# Patient Record
Sex: MALE | Race: WHITE | NOT HISPANIC OR LATINO | Employment: FULL TIME | URBAN - METROPOLITAN AREA
[De-identification: names, ages, dates, MRNs, and addresses within clinical notes are randomized per-mention and may not be internally consistent; named-entity substitution may affect disease eponyms.]

---

## 2017-03-23 ENCOUNTER — TRANSFERRED RECORDS (OUTPATIENT)
Dept: HEALTH INFORMATION MANAGEMENT | Facility: CLINIC | Age: 52
End: 2017-03-23

## 2017-03-23 LAB — EJECTION FRACTION: 67

## 2017-03-25 ENCOUNTER — TRANSFERRED RECORDS (OUTPATIENT)
Dept: HEALTH INFORMATION MANAGEMENT | Facility: CLINIC | Age: 52
End: 2017-03-25

## 2017-04-04 ENCOUNTER — TRANSFERRED RECORDS (OUTPATIENT)
Dept: HEALTH INFORMATION MANAGEMENT | Facility: CLINIC | Age: 52
End: 2017-04-04

## 2017-11-10 ENCOUNTER — TRANSFERRED RECORDS (OUTPATIENT)
Dept: HEALTH INFORMATION MANAGEMENT | Facility: CLINIC | Age: 52
End: 2017-11-10

## 2017-11-10 LAB
ALT SERPL-CCNC: 11 IU/L (ref 0–44)
AST SERPL-CCNC: 22 IU/L (ref 0–40)
CREAT SERPL-MCNC: 0.67 MG/DL (ref 0.76–1.27)
GFR SERPL CREATININE-BSD FRML MDRD: 109 ML/MIN/1.73M2
GLUCOSE SERPL-MCNC: 95 MG/DL (ref 65–99)
POTASSIUM SERPL-SCNC: 4.6 MMOL/L (ref 3.5–5.2)

## 2018-02-16 ENCOUNTER — TELEPHONE (OUTPATIENT)
Dept: OTHER | Facility: CLINIC | Age: 53
End: 2018-02-16

## 2018-02-16 DIAGNOSIS — I73.9 INTERMITTENT CLAUDICATION (H): Primary | ICD-10-CM

## 2018-02-16 NOTE — TELEPHONE ENCOUNTER
Pt spouse, Mariam, left  stating pt had been referred to Dr. Castillo by Dr. Ortega ([facility]).  Called Mariam back to discuss reason for referral.  Stated pt had received referral more than 6 months ago and pt had not pursued the referral.  Mariam stated there had been some imaging completed, specifically imaging done to evaluate for pt increase in headaches.  Mariam also states pt has difficulty walking and has pain after walking approximately 20 feet.  Denies any open wounds or sores.  Phone call made to [facility] to receive most recent OV notes/imaging results.  Will review documents once received to determine if any further imaging is needed and make consult appt with Dr. Castillo.  Informed Mariam a call back would be made once plan of care is determined.  Spouse verbalized understanding, had no further questions, and agreed with plan.  Belinda Mcelroy, DEANDREN, RN

## 2018-02-16 NOTE — TELEPHONE ENCOUNTER
"Received fax of records from Dr. Ortega's office.   Per 3/16/17 note: \"no palpable DP or PostTib pulses; decreased hair pattern from the ankles distally, sluggish cap refill in feet\"  \"Intermittent claudication: referred to Dr. Castillo for ABIs and consultation\".    Called Mariam (wife) back, left , regarding plan.  Stated [facility] will call her number ([phone]) to arrange appointments.    Pt needs to be scheduled for ADRIAN with exercise and consult with Dr. Castillo (per referring).  Will route to scheduling to coordinate an appointment at next available.    CLAUDIO Rodrigues RN    "

## 2018-03-08 ENCOUNTER — OFFICE VISIT (OUTPATIENT)
Dept: OTHER | Facility: CLINIC | Age: 53
End: 2018-03-08
Attending: SURGERY
Payer: COMMERCIAL

## 2018-03-08 ENCOUNTER — HOSPITAL ENCOUNTER (OUTPATIENT)
Dept: ULTRASOUND IMAGING | Facility: CLINIC | Age: 53
Discharge: HOME | End: 2018-03-08
Attending: SURGERY | Admitting: SURGERY
Payer: COMMERCIAL

## 2018-03-08 VITALS
HEART RATE: 87 BPM | WEIGHT: 155 LBS | DIASTOLIC BLOOD PRESSURE: 77 MMHG | SYSTOLIC BLOOD PRESSURE: 155 MMHG | HEIGHT: 70 IN | BODY MASS INDEX: 22.19 KG/M2

## 2018-03-08 DIAGNOSIS — I73.9 INTERMITTENT CLAUDICATION (H): ICD-10-CM

## 2018-03-08 DIAGNOSIS — F17.200 TOBACCO DEPENDENCE SYNDROME: ICD-10-CM

## 2018-03-08 DIAGNOSIS — I73.9 PAD (PERIPHERAL ARTERY DISEASE) (H): Primary | ICD-10-CM

## 2018-03-08 PROCEDURE — 99204 OFFICE O/P NEW MOD 45 MIN: CPT | Mod: ZP | Performed by: SURGERY

## 2018-03-08 PROCEDURE — 93924 LWR XTR VASC STDY BILAT: CPT

## 2018-03-08 PROCEDURE — G0463 HOSPITAL OUTPT CLINIC VISIT: HCPCS

## 2018-03-08 RX ORDER — ROSUVASTATIN CALCIUM 20 MG/1
TABLET, COATED ORAL
Status: ON HOLD | COMMUNITY
Start: 2018-02-14 | End: 2018-03-23

## 2018-03-08 RX ORDER — LISINOPRIL/HYDROCHLOROTHIAZIDE 10-12.5 MG
2 TABLET ORAL EVERY MORNING
COMMUNITY
Start: 2018-02-14 | End: 2023-04-25

## 2018-03-08 NOTE — NURSING NOTE
"Chief Complaint   Patient presents with     Consult     ADRIAN w/ exercise (2:15 VHC; 3:00 WRO) History of intermittent claudication; coordinate appointment with Dr. Castillo       Initial /75 (BP Location: Left arm, Patient Position: Chair, Cuff Size: Adult Regular)  Pulse 87  Ht 5' 10\" (1.778 m)  Wt 155 lb (70.3 kg)  BMI 22.24 kg/m2 Estimated body mass index is 22.24 kg/(m^2) as calculated from the following:    Height as of this encounter: 5' 10\" (1.778 m).    Weight as of this encounter: 155 lb (70.3 kg).  Medication Reconciliation: complete     Trina Neal MA   "

## 2018-03-08 NOTE — LETTER
[facility] [place] [address]  [place] [address]  Phone: [phone]  Fax: [phone]      2018    Re: Grey Candelario - 1965    Grey Candelario and his significant other come to see me in the office today for evaluation of his leg discomfort.  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  Symptoms depend on the levels activity including how fast he walks or whether he is going up or down stairs.  Pain is primarily in the posterior calf but also in the buttock area.  Complains of leg fatigue with any exercise.  He has never had any breast pain or ulcerations.     He is referred to see me today by Dr. Ortega.     PMH: Medications: Lisinopril-HCTZ 10-12 0.5 mg daily, Crestor 20 mg daily     He has been on these medications for approximately a year.               Medical: Hypertension, hyperlipidemia                         History of carotid bruits.  Reported normal MRI performed at                        [facility] 1 year ago (not available to me today).              Surgical: L4-5 anterior posterior fusion several years ago with initial good                         results but noticing more symptoms recently.     Patient smokes 1/2-2 pack cigarettes daily for past 40 years.  Has not tried to quit and very questionable whether he wants to quit.      FMH: No significant underlying vascular problems nor diabetes.  ROS: Walks on a daily basis at work.  Becoming more difficult to due to increasing leg discomfort also involving buttock area.  Denies a history of cardiac problems.  No history of diabetes.        Exam: Very pleasant alert thin gentleman.  Normal affect.             Blood pressure  146/75 left arm  and 155/77 right arm.  Pulse 87 regular             Soft bilateral carotid bruits.  Chest= clear.   Cardiovascular=RR             Abdomen= soft nontender. Well-healed left infraumbilical paramedian incision from back surgery.             Extremities= normal sensation, no edema, no ulcerations.             +2 right femoral and +3 left femoral pulse.             No palpable popliteal pulses bilaterally. No palpable right pedal pulses.  +2 left dorsalis pedis pulse.        Ankle-brachial index is 0.62 on the right posterior tibial artery and 0.76 in the left posterior tibial are rest.  This decreases to 0.53 on the right and 0.67 on the left with exercise at 5 minutes. Patient noted bilateral buttock discomfort at 1 minute more prominent on the right than left side with generalized bilateral calf fatigue.  Waveforms are abnormal bilaterally.  More so on the right with the femoral arteries biphasic in the pedal arteries monophasic.  On the left the femoral and popliteal were triphasic/biphasic with a biphasic pedal Doppler.      Impression: Bilateral intermittent claudication symptoms more prominent in the right than left side.  He has decreased femoral pulses bilaterally but much more so on the right and this is confirmed by the ADRIAN with exercise.  I suspect he has right greater than left iliac occlusive disease and likely more distal disease also.  This is not going to improve with any conservative measures.  He is becoming more symptomatic and this is affecting both his work and normal lifestyle.  Thus intervention will be necessary.     He will need an aortogram with bilateral runoffs via left femoral percutaneous approach.  There is a good likelihood we would be able to perform angioplasty and stenting if these are indeed iliac lesions and decisions would be made during the procedure whether interventional procedures are available if there is more distal disease.  Risks and benefits of the angiogram and discussed  with the patient and significant other.  Bleeding from the exit site would be less likely due to his thin body habitus.  He also is no evidence of renal insufficiency. He should be able to return to work in normal activities within 1-2 days following the procedure. If it is not feasible to correct these problems with interventional radiology technique we would discuss the possibility of surgical treatment of this would not be performed at the time of the angiogram.     My major concern is risk factor control. His blood pressure is still elevated but better on his medications.  I am not sure whether his LDL is at goal of less than 70--which is what we desire for people with underlying vascular problems.  He definitely needs to quit smoking completely and we spent a good portion of the consultation discussing the importance of this.  Ongoing smokers have a much higher risk of recurrent stenosis following angioplasty and stenting and also any vascular surgical procedure.  He is reluctant to quit smoking but I think he understood the message that we stressed today.     He does have carotid bruits but apparently normal MRI.  We will try to obtain the MRI reports from the outside source.          iGorgio Castillo MD

## 2018-03-08 NOTE — PROGRESS NOTES
[facility]    Grey Candelario and his significant other come to see me in the office today for evaluation of his leg discomfort.  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  Symptoms depend on the levels activity including how fast he walks or whether he is going up or down stairs.  Pain is primarily in the posterior calf but also in the buttock area.  Complains of leg fatigue with any exercise.  He has never had any breast pain or ulcerations.    He is referred to see me today by Dr. Orteag.    PMH: Medications: Lisinopril-HCTZ 10-12 0.5 mg daily                                  Crestor 20 mg daily                   He has been on these medications for approximately a year.                        Medical: Hypertension, hyperlipidemia                         History of carotid bruits.  Reported normal MRI performed at                                         [facility] 1 year ago (not available to me today).             Surgical: L4-5 anterior posterior fusion several years ago with initial good                                   results but noticing more symptoms recently.              Patient smokes 1/2-2 pack cigarettes daily for past 40 years.  Has not tried to quit and very questionable whether he wants to quit.      FMH: No significant underlying vascular problems nor diabetes.  ROS: Walks on a daily basis at work.  Becoming more difficult to due to increasing leg discomfort also involving buttock area.  Denies a history of cardiac problems.  No history of diabetes.      Exam: Very pleasant alert thin gentleman.  Normal affect.              Blood pressure  146/75 left arm and 155/77 right arm.  Pulse 87 regular              Soft bilateral  carotid bruits.  Chest= clear.   Cardiovascular=RR              Abdomen= soft nontender.  Well-healed left infraumbilical paramedian                          incision from back surgery.              Extremities= normal sensation, no edema, no ulcerations.               +2 right femoral and +3 left femoral pulse.               No palpable popliteal pulses bilaterally.               No palpable right pedal pulses.  +2 left dorsalis pedis pulse.      Ankle-brachial index is 0.62 on the right posterior tibial artery and 0.76 in the left posterior tibial are rest.  This decreases to 0.53 on the right and 0.67 on the left with exercise at 5 minutes.  Patient noted bilateral buttock discomfort at 1 minute more prominent on the right than left side with generalized bilateral calf fatigue.  Waveforms are abnormal bilaterally.  More so on the right with the femoral arteries biphasic in the pedal arteries monophasic.  On the left the femoral and popliteal were triphasic/biphasic with a biphasic pedal Doppler.             Impression: Bilateral intermittent claudication symptoms more prominent in the right than left side.  He has decreased femoral pulses bilaterally but much more so on the right and this is confirmed by the ADRIAN with exercise.  I suspect he has right greater than left iliac occlusive disease and likely more distal disease also.  This is not going to improve with any conservative measures.  He is becoming more symptomatic and this is affecting both his work and normal lifestyle.  Thus intervention will be necessary.                        He will need an aortogram with bilateral runoffs via left femoral percutaneous approach.  There is a good likelihood we would be able to perform angioplasty and stenting if these are indeed iliac lesions and decisions would be made during the procedure whether interventional procedures are available if there is more distal disease.  Risks and benefits of the angiogram and  discussed with the patient and significant other.  Bleeding from the exit site would be less likely due to his thin body habitus.  He also is no evidence of renal insufficiency.  He should be able to return to work in normal activities within 1-2 days following the procedure.  If it is not feasible to correct these problems with interventional radiology technique we would discuss the possibility of surgical treatment of this would not be performed at the time of the angiogram.                   My major concern is risk factor control.      His blood pressure is still elevated but better on his medications.  I am not sure whether his LDL is at goal of less than 70--which is what we desire for people with underlying vascular problems.  He definitely needs to quit smoking completely and we spent a good portion of the consultation discussing the importance of this.  Ongoing smokers have a much higher risk of recurrent stenosis following angioplasty and stenting and also any vascular surgical procedure.  He is reluctant to quit smoking but I think he understood the message that we stressed today.                   He does have carotid bruits but apparently normal MRI.  We will try to obtain the MRI reports from the outside source.      I spent over 45 minutes with the patient and his significant other today with over 50% in counseling        Giorgio Castillo MD     Please route or send letter to:  Primary Care Provider (PCP)        Addendum: We are able to [facility] which revealed no carotid disease.  Aortic vbdm-ifzeadzpm-zqpamts arteries are also normal.    Giorgio Castillo MD

## 2018-03-08 NOTE — MR AVS SNAPSHOT
"              After Visit Summary   3/8/2018    Grey Candelario    MRN: [id number]           Patient Information     Date Of Birth          1965        Visit Information        Provider Department      3/8/2018 3:00 PM Giorgio Castillo MD [facility]      Today's Diagnoses     PAD (peripheral artery disease) (H)    -  1    Tobacco dependence syndrome           Follow-ups after your visit        Who to contact     If you have questions or need follow up information about today's clinic visit or your schedule please contact [facility] directly at [phone].  Normal or non-critical lab and imaging results will be communicated to you by [employer], letter or phone within 4 business days after the clinic has received the results. If you do not hear from us within 7 days, please contact the clinic through [employer] or phone. If you have a critical or abnormal lab result, we will notify you by phone as soon as possible.  Submit refill requests through [employer] or call your pharmacy and they will forward the refill request to us. Please allow 3 business days for your refill to be completed.          Additional Information About Your Visit        MyChart Information     [employer] lets you send messages to your doctor, view your test results, renew your prescriptions, schedule appointments and more. To sign up, go to [email or web address] . Click on \"Log in\" on the left side of the screen, which will take you to the Welcome page. Then click on \"Sign up Now\" on the right side of the page.     You will be asked to enter the access code listed below, as well as some personal information. Please follow the directions to create your username and password.     Your access code is: [email or web address]  Expires: 2018  5:11 PM     Your access code will  in 90 days. If you need help or a new code, please call your [place] clinic or " "[phone].        Care EveryWhere ID     This is your Care EveryWhere ID. This could be used by other organizations to access your [place] medical records  [id number]        Your Vitals Were     Pulse Height BMI (Body Mass Index)             87 5' 10\" (1.778 m) 22.24 kg/m2          Blood Pressure from Last 3 Encounters:   03/08/18 155/77   07/21/13 130/76    Weight from Last 3 Encounters:   03/08/18 155 lb (70.3 kg)   07/21/13 160 lb (72.6 kg)              Today, you had the following     No orders found for display       Primary Care Provider Office Phone # Fax #    Santosh Cresencio Ortega [phone]       [facility] [address]    [facility] [address]        Equal Access to Services     [facility] : Hadii aad ku hadasho Soomaali, waaxda luqadaha, qaybta kaalmada adeegyada, abundio huntin hayaan nicole traylor . So [facility] [phone].    ATENCIÓN: Si habla español, tiene a grant disposición servicios gratuitos de asistencia lingüística. Terence al [phone].    We comply with applicable federal civil rights laws and [place] laws. We do not discriminate on the basis of race, color, national origin, age, disability, sex, sexual orientation, or gender identity.            Thank you!     Thank you for choosing [facility] [place]  for your care. Our goal is always to provide you with excellent care. Hearing back from our patients is one way we can continue to improve our services. Please take a few minutes to complete the written survey that you may receive in the mail after your visit with us. Thank you!             Your Updated Medication List - Protect others around you: Learn how to safely use, store and throw away your medicines at www.disposemymeds.org.          This list is accurate as of 3/8/18  5:11 PM.  Always use your most recent med list.                   Brand Name Dispense Instructions for use Diagnosis    " lisinopril-hydrochlorothiazide 10-12.5 MG per tablet    PRINZIDE/ZESTORETIC          rosuvastatin 20 MG tablet    CRESTOR

## 2018-03-12 ENCOUNTER — TELEPHONE (OUTPATIENT)
Dept: OTHER | Facility: CLINIC | Age: 53
End: 2018-03-12

## 2018-03-12 NOTE — TELEPHONE ENCOUNTER
Type of surgery: aortogram with bilateral runoff  Location of surgery: [facility] OR  Date and time of surgery: 3/23/18 @ 7:30 am  Surgeon: Dr Andrea Shepard to perform  Pre-Op Appt Date: unknown  Post-Op Appt Date: unknown   Packet sent out: given to patient  Pre-cert/Authorization completed:  Sent  Date: 031218 JAM

## 2018-03-23 ENCOUNTER — HOSPITAL ENCOUNTER (OUTPATIENT)
Facility: CLINIC | Age: 53
Discharge: HOME | End: 2018-03-23
Attending: RADIOLOGY | Admitting: RADIOLOGY
Payer: COMMERCIAL

## 2018-03-23 ENCOUNTER — APPOINTMENT (OUTPATIENT)
Dept: INTERVENTIONAL RADIOLOGY/VASCULAR | Facility: CLINIC | Age: 53
End: 2018-03-23
Attending: SURGERY
Payer: COMMERCIAL

## 2018-03-23 VITALS
BODY MASS INDEX: 22.19 KG/M2 | TEMPERATURE: 98.1 F | HEIGHT: 70 IN | SYSTOLIC BLOOD PRESSURE: 155 MMHG | RESPIRATION RATE: 18 BRPM | DIASTOLIC BLOOD PRESSURE: 70 MMHG | HEART RATE: 78 BPM | WEIGHT: 155 LBS | OXYGEN SATURATION: 98 %

## 2018-03-23 DIAGNOSIS — E78.5 HYPERLIPIDEMIA LDL GOAL <70: Primary | ICD-10-CM

## 2018-03-23 DIAGNOSIS — F17.218 CIGARETTE NICOTINE DEPENDENCE WITH OTHER NICOTINE-INDUCED DISORDER: ICD-10-CM

## 2018-03-23 DIAGNOSIS — I70.213 ATHEROSCLER OF NATIVE ARTERY OF BOTH LEGS WITH INTERMIT CLAUDICATION (H): ICD-10-CM

## 2018-03-23 LAB
APTT PPP: 28 SEC (ref 22–37)
CHOLEST SERPL-MCNC: 175 MG/DL
CREAT SERPL-MCNC: 0.74 MG/DL (ref 0.66–1.25)
ERYTHROCYTE [DISTWIDTH] IN BLOOD BY AUTOMATED COUNT: 12.2 % (ref 10–15)
GFR SERPL CREATININE-BSD FRML MDRD: >90 ML/MIN/1.7M2
HBA1C MFR BLD: 4.9 % (ref 4.3–6)
HCT VFR BLD AUTO: 39.3 % (ref 40–53)
HDLC SERPL-MCNC: 80 MG/DL
HGB BLD-MCNC: 13.7 G/DL (ref 13.3–17.7)
INR PPP: 0.96 (ref 0.86–1.14)
LDLC SERPL CALC-MCNC: 82 MG/DL
MCH RBC QN AUTO: 34 PG (ref 26.5–33)
MCHC RBC AUTO-ENTMCNC: 34.9 G/DL (ref 31.5–36.5)
MCV RBC AUTO: 98 FL (ref 78–100)
NONHDLC SERPL-MCNC: 95 MG/DL
PLATELET # BLD AUTO: 188 10E9/L (ref 150–450)
RBC # BLD AUTO: 4.03 10E12/L (ref 4.4–5.9)
TRIGL SERPL-MCNC: 63 MG/DL
WBC # BLD AUTO: 5.6 10E9/L (ref 4–11)

## 2018-03-23 PROCEDURE — 25000132 ZZH RX MED GY IP 250 OP 250 PS 637: Performed by: INTERNAL MEDICINE

## 2018-03-23 PROCEDURE — 25000128 H RX IP 250 OP 636: Performed by: RADIOLOGY

## 2018-03-23 PROCEDURE — 27210742 ZZH CATH CR1

## 2018-03-23 PROCEDURE — 99214 OFFICE O/P EST MOD 30 MIN: CPT | Performed by: INTERNAL MEDICINE

## 2018-03-23 PROCEDURE — 27210892 ZZH CATH CR4

## 2018-03-23 PROCEDURE — 36415 COLL VENOUS BLD VENIPUNCTURE: CPT | Performed by: RADIOLOGY

## 2018-03-23 PROCEDURE — 37221: CPT | Mod: 50

## 2018-03-23 PROCEDURE — 27210740 ZZH ACCESSORY CR3

## 2018-03-23 PROCEDURE — 83036 HEMOGLOBIN GLYCOSYLATED A1C: CPT | Performed by: RADIOLOGY

## 2018-03-23 PROCEDURE — C1725 CATH, TRANSLUMIN NON-LASER: HCPCS

## 2018-03-23 PROCEDURE — 27210906 ZZH KIT CR8

## 2018-03-23 PROCEDURE — C1769 GUIDE WIRE: HCPCS

## 2018-03-23 PROCEDURE — 99213 OFFICE O/P EST LOW 20 MIN: CPT | Performed by: SURGERY

## 2018-03-23 PROCEDURE — C1876 STENT, NON-COA/NON-COV W/DEL: HCPCS

## 2018-03-23 PROCEDURE — 37221 ZZHC REVASC ILIAC ART, ANGIO/STENT, INIT VESSEL: CPT

## 2018-03-23 PROCEDURE — 40000853 ZZH STATISTIC ANGIOGRAM, STENT, VERTEBRO PLASTY

## 2018-03-23 PROCEDURE — 27210804 ZZH SHEATH CR3

## 2018-03-23 PROCEDURE — 85730 THROMBOPLASTIN TIME PARTIAL: CPT | Performed by: RADIOLOGY

## 2018-03-23 PROCEDURE — 85027 COMPLETE CBC AUTOMATED: CPT | Performed by: RADIOLOGY

## 2018-03-23 PROCEDURE — 85610 PROTHROMBIN TIME: CPT | Performed by: RADIOLOGY

## 2018-03-23 PROCEDURE — 80061 LIPID PANEL: CPT | Performed by: RADIOLOGY

## 2018-03-23 PROCEDURE — 27210808 ZZH SHEATH CR7

## 2018-03-23 PROCEDURE — 25000125 ZZHC RX 250: Performed by: RADIOLOGY

## 2018-03-23 PROCEDURE — 82565 ASSAY OF CREATININE: CPT | Performed by: RADIOLOGY

## 2018-03-23 PROCEDURE — 27210886 ZZH ACCESSORY CR5

## 2018-03-23 PROCEDURE — 75774 ARTERY X-RAY EACH VESSEL: CPT

## 2018-03-23 PROCEDURE — 27210845 ZZH DEVICE INFLATION CR5

## 2018-03-23 PROCEDURE — 75625 CONTRAST EXAM ABDOMINL AORTA: CPT

## 2018-03-23 RX ORDER — FENTANYL CITRATE 50 UG/ML
INJECTION, SOLUTION INTRAMUSCULAR; INTRAVENOUS
Status: DISCONTINUED
Start: 2018-03-23 | End: 2018-03-23 | Stop reason: HOSPADM

## 2018-03-23 RX ORDER — LIDOCAINE HYDROCHLORIDE 10 MG/ML
1-30 INJECTION, SOLUTION EPIDURAL; INFILTRATION; INTRACAUDAL; PERINEURAL
Status: COMPLETED | OUTPATIENT
Start: 2018-03-23 | End: 2018-03-23

## 2018-03-23 RX ORDER — SODIUM CHLORIDE 9 MG/ML
INJECTION, SOLUTION INTRAVENOUS CONTINUOUS
Status: DISCONTINUED | OUTPATIENT
Start: 2018-03-23 | End: 2018-03-23 | Stop reason: HOSPADM

## 2018-03-23 RX ORDER — BUPROPION HYDROCHLORIDE 150 MG/1
150 TABLET, FILM COATED, EXTENDED RELEASE ORAL 2 TIMES DAILY
Qty: 60 TABLET | Refills: 3 | Status: ON HOLD | OUTPATIENT
Start: 2018-03-23 | End: 2019-11-14

## 2018-03-23 RX ORDER — FENTANYL CITRATE 50 UG/ML
25-50 INJECTION, SOLUTION INTRAMUSCULAR; INTRAVENOUS EVERY 5 MIN PRN
Status: DISCONTINUED | OUTPATIENT
Start: 2018-03-23 | End: 2018-03-23 | Stop reason: HOSPADM

## 2018-03-23 RX ORDER — HEPARIN SODIUM 1000 [USP'U]/ML
INJECTION, SOLUTION INTRAVENOUS; SUBCUTANEOUS
Status: DISCONTINUED
Start: 2018-03-23 | End: 2018-03-23 | Stop reason: HOSPADM

## 2018-03-23 RX ORDER — LIDOCAINE 40 MG/G
CREAM TOPICAL
Status: DISCONTINUED | OUTPATIENT
Start: 2018-03-23 | End: 2018-03-23 | Stop reason: HOSPADM

## 2018-03-23 RX ORDER — ROSUVASTATIN CALCIUM 40 MG/1
40 TABLET, COATED ORAL DAILY
Qty: 30 TABLET | Refills: 11 | Status: ON HOLD | OUTPATIENT
Start: 2018-03-23 | End: 2018-11-16

## 2018-03-23 RX ORDER — BUPROPION HYDROCHLORIDE 150 MG/1
150 TABLET, EXTENDED RELEASE ORAL 2 TIMES DAILY
Status: DISCONTINUED | OUTPATIENT
Start: 2018-03-23 | End: 2018-03-23 | Stop reason: HOSPADM

## 2018-03-23 RX ORDER — ROSUVASTATIN CALCIUM 20 MG/1
40 TABLET, COATED ORAL DAILY
Status: DISCONTINUED | OUTPATIENT
Start: 2018-03-23 | End: 2018-03-23 | Stop reason: HOSPADM

## 2018-03-23 RX ORDER — CLOPIDOGREL BISULFATE 75 MG/1
75 TABLET ORAL DAILY
Qty: 90 TABLET | Refills: 3 | Status: SHIPPED | OUTPATIENT
Start: 2018-03-23 | End: 2019-06-06

## 2018-03-23 RX ORDER — NALOXONE HYDROCHLORIDE 0.4 MG/ML
.1-.4 INJECTION, SOLUTION INTRAMUSCULAR; INTRAVENOUS; SUBCUTANEOUS
Status: DISCONTINUED | OUTPATIENT
Start: 2018-03-23 | End: 2018-03-23 | Stop reason: HOSPADM

## 2018-03-23 RX ORDER — NICOTINE 21 MG/24HR
1 PATCH, TRANSDERMAL 24 HOURS TRANSDERMAL DAILY
Status: DISCONTINUED | OUTPATIENT
Start: 2018-03-23 | End: 2018-03-23 | Stop reason: HOSPADM

## 2018-03-23 RX ORDER — FLUMAZENIL 0.1 MG/ML
0.2 INJECTION, SOLUTION INTRAVENOUS
Status: DISCONTINUED | OUTPATIENT
Start: 2018-03-23 | End: 2018-03-23 | Stop reason: HOSPADM

## 2018-03-23 RX ORDER — IODIXANOL 320 MG/ML
150 INJECTION, SOLUTION INTRAVASCULAR ONCE
Status: COMPLETED | OUTPATIENT
Start: 2018-03-23 | End: 2018-03-23

## 2018-03-23 RX ORDER — NICOTINE 21 MG/24HR
1 PATCH, TRANSDERMAL 24 HOURS TRANSDERMAL DAILY
Qty: 30 PATCH | Refills: 1 | Status: ON HOLD | OUTPATIENT
Start: 2018-03-23 | End: 2019-11-14

## 2018-03-23 RX ORDER — ACETAMINOPHEN 500 MG
500-1000 TABLET ORAL EVERY 6 HOURS PRN
Status: DISCONTINUED | OUTPATIENT
Start: 2018-03-23 | End: 2018-03-23 | Stop reason: HOSPADM

## 2018-03-23 RX ORDER — LIDOCAINE HYDROCHLORIDE 10 MG/ML
INJECTION, SOLUTION INFILTRATION; PERINEURAL
Status: DISCONTINUED
Start: 2018-03-23 | End: 2018-03-23 | Stop reason: HOSPADM

## 2018-03-23 RX ADMIN — BUPROPION HYDROCHLORIDE 150 MG: 150 TABLET, FILM COATED, EXTENDED RELEASE ORAL at 15:26

## 2018-03-23 RX ADMIN — FENTANYL CITRATE 50 MCG: 50 INJECTION INTRAMUSCULAR; INTRAVENOUS at 08:11

## 2018-03-23 RX ADMIN — FENTANYL CITRATE 50 MCG: 50 INJECTION INTRAMUSCULAR; INTRAVENOUS at 08:21

## 2018-03-23 RX ADMIN — MIDAZOLAM HYDROCHLORIDE 1 MG: 1 INJECTION, SOLUTION INTRAMUSCULAR; INTRAVENOUS at 08:21

## 2018-03-23 RX ADMIN — FENTANYL CITRATE 50 MCG: 50 INJECTION INTRAMUSCULAR; INTRAVENOUS at 08:28

## 2018-03-23 RX ADMIN — MIDAZOLAM HYDROCHLORIDE 1 MG: 1 INJECTION, SOLUTION INTRAMUSCULAR; INTRAVENOUS at 08:11

## 2018-03-23 RX ADMIN — SODIUM CHLORIDE: 9 INJECTION, SOLUTION INTRAVENOUS at 06:39

## 2018-03-23 RX ADMIN — MIDAZOLAM HYDROCHLORIDE 1 MG: 1 INJECTION, SOLUTION INTRAMUSCULAR; INTRAVENOUS at 08:47

## 2018-03-23 RX ADMIN — MIDAZOLAM HYDROCHLORIDE 1 MG: 1 INJECTION, SOLUTION INTRAMUSCULAR; INTRAVENOUS at 09:26

## 2018-03-23 RX ADMIN — LIDOCAINE HYDROCHLORIDE 8 ML: 10 INJECTION, SOLUTION INFILTRATION; PERINEURAL at 10:19

## 2018-03-23 RX ADMIN — IODIXANOL 110 ML: 320 INJECTION, SOLUTION INTRAVASCULAR at 10:23

## 2018-03-23 RX ADMIN — MIDAZOLAM HYDROCHLORIDE 1 MG: 1 INJECTION, SOLUTION INTRAMUSCULAR; INTRAVENOUS at 08:28

## 2018-03-23 RX ADMIN — FENTANYL CITRATE 50 MCG: 50 INJECTION INTRAMUSCULAR; INTRAVENOUS at 08:46

## 2018-03-23 RX ADMIN — FENTANYL CITRATE 50 MCG: 50 INJECTION INTRAMUSCULAR; INTRAVENOUS at 10:11

## 2018-03-23 RX ADMIN — FENTANYL CITRATE 50 MCG: 50 INJECTION INTRAMUSCULAR; INTRAVENOUS at 09:25

## 2018-03-23 RX ADMIN — MIDAZOLAM HYDROCHLORIDE 1 MG: 1 INJECTION, SOLUTION INTRAMUSCULAR; INTRAVENOUS at 10:11

## 2018-03-23 NOTE — PROCEDURES
RADIOLOGY PROCEDURE NOTE  Patient name: Grey Candelario  MRN: [id number]  : 1965    Pre-procedure diagnosis: Bilateral LE claudication  Post-procedure diagnosis: Same    Procedure Date/Time: 2018  11:51 AM  Procedure: Left groin (arterial) access and abdominal aortogram with bilateral LE angiogram.  7 mm X 60 mm self expanding SMART stent in right EIA, dilated up to 7 mm.  8 mm X 40 mm self expanding SMART stent in left EIA (dilated up to 7 mm).  Bilateral mild to moderate CFA arterial plaque.  Both SFA, popliteal, and 3 vessel runoff systems are patent.  No complications.  Plavix for 9 months.  Estimated blood loss:  5 ml  Specimen(s) collected with description: Please see above.  The patient tolerated the procedure well with no immediate complications.  Significant findings:  Please see above.    See imaging dictation for procedural details.    Provider name: Andrea Yen  Assistant(s):None

## 2018-03-23 NOTE — IP AVS SNAPSHOT
[address] [place] [address]    Phone:  [phone]                                       After Visit Summary   3/23/2018    Grey Candelario    MRN: [id number]           After Visit Summary Signature Page     I have received my discharge instructions, and my questions have been answered. I have discussed any challenges I see with this plan with the nurse or doctor.    ..........................................................................................................................................  Patient/Patient Representative Signature      ..........................................................................................................................................  Patient Representative Print Name and Relationship to Patient    ..................................................               ................................................  Date                                            Time    ..........................................................................................................................................  Reviewed by Signature/Title    ...................................................              ..............................................  Date                                                            Time

## 2018-03-23 NOTE — PROGRESS NOTES
1100- Returned from IR at 1045. VSS. Left groin site WDL.  1215- Eating a meal without difficulty.  1315- Voided in urinal. Girlfriend-Mariam updated via phone.  1540- Up and out of bed. Walked in hallway with nurse. Was lightheaded initially but that cleared.  Site remained soft, no bleeding. DCI reviewed with patient. He was given his stent card, contrast DCI, and AVS. Patient verbalizes understanding and will  [facility] tonight.

## 2018-03-23 NOTE — DISCHARGE INSTRUCTIONS
Peripheral Angiogram Discharge Instructions - Femoral     After you go home:      Have an adult stay with you until tomorrow.    Drink extra fluids for 2 days.    You may resume your normal diet.    No smoking       For 24 hours - due to the sedation you received:    Relax and take it easy.    Do NOT make any important or legal decisions.    Do NOT drive or operate machines at home or at work.    Do NOT drink alcohol.    Care of Groin Puncture Site:      For the first 24 hrs - check the puncture site every 1-2 hours while awake.    For 2 days, when you cough, sneeze, laugh or move your bowels, hold your hand over the puncture site and press firmly.    Remove the bandaid after 24 hours. If there is minor oozing, apply another bandaid and remove it after 12 hours.    It is normal to have a small bruise or pea size lump at the site.    You may shower tomorrow.  Do NOT take a bath, or use a hot tub or pool for at least 3 days. Do NOT scrub the site. Do not use lotion or powder near the puncture site.     Activity:            For 2 days:    No stooping or squatting    Do NOT do any heavy activity such as exercise, lifting, or straining.     No housework, yard work or any activity that make you sweat    Do NOT lift more than 10 pounds    Bleeding:      If you start bleeding from the site in your groin, lie down flat and press firmly on/above the site for 10 minutes.     Once bleeding stops, lay flat for 2 hours.     Call the [facility] as soon as you can.       Call 911 right away if you have heavy bleeding or bleeding that does not stop.      Medicines:      If you are taking antiplatelet medications such as Plavix, do not stop taking it until you talk to your provider.       Take your medications, including blood thinners, unless your provider tells you not to.  If you take Coumadin (Warfarin), have your INR checked by your provider in  3-5 days. Call your clinic to schedule this.    If you have stopped any  medicines, check with your provider about when to restart them.        Follow Up Appointments:      Follow up with [facility] as directed.    Call the clinic if:      You have increased pain or a large or growing hard lump around the site.    The site is red, swollen, hot or tender.    Blood or fluid is draining from the site.    You have chills or a fever greater than 101 F (38 C).    Your leg feels numb, cool or changes color.    You have hives, a rash or unusual itching.    New pain in the back or belly that you cannot control with Tylenol.    Any questions or concerns.    Other Instructions:      You received a stent - carry your stent card with you at all times.      If you have questions or your original symptoms do not improve, call:         [facility] @ [phone]

## 2018-03-23 NOTE — CONSULTS
VASCULAR SURGERY    Grey Candelario was admitted for angiography due to bilateral right greater than left chronic claudication symptoms that has become progressively worsening affecting his work and normal activities.  Pain is primarily in the calf region but also in the buttock area.  On exam he is noted to have a decreased right femoral pulse compared to the left.  There were no palpable right pedal pulses with a +2 left dorsalis pedis pulse.    Ankle-brachial index was 0.62 and the right posterior tibial artery decrease of 0.53 with exercise and on the left 0.76 decreasing to 0.67 with exercise.  We felt that he most likely had iliac disease and possible infrainguinal disease also.    He has history of hyperlipidemia under good control with medications along with hypertension.  He is a long-term smoker up to 2 packs daily for 40 years and has little desire to quit.  We strongly stressed the importance of complete smoking cessation with underlying peripheral artery disease and also that any intervention would have a higher risk of recurrent stenosis if ongoing tobacco abuse is continued.  Patient recently underwent a carotid evaluation which was unremarkable.      Hospital course:   Patient underwent aortogram with bilateral runoffs via left femoral percutaneous approach by Dr. Shepard of interventional radiology.  This revealed mild aortic and common iliac disease.  Mild disease at the origin of the internal iliacs noted but otherwise large vessels.  There was a high-grade stenosis within the distal right extraocular artery and slightly lesser on the left with diffuse disease through the external ocular arteries.  Mild to moderate disease was noted in the right common femoral with some mild disease in the right mid superficial femoral artery but otherwise three-vessel runoff to the foot.  Mild disease in the left common femoral and superficial femoral artery noted with three-vessel runoff to the foot and a  moderate stenosis in the origin of the anterior tibial artery.  The stenosis and external arc artery were hemostatically significant by pressure gradients and underwent successful angioplasty and stenting.  A 7 mm stent was placed on the right and an 8 mm stent on the left.  Patient tolerated this very well.                       In the recovery suite he had +3 palpable pulses in both dorsalis pedis and posterior tibial arteries.  No groin hematoma or other complication.                        Admission laboratory:  SCr= 0.74   hemoglobin A1c= 5.6    LDL= 82                           Following the angiogram I reviewed the angiographic findings with he and his girlfriend.  I also strongly stressed the importance of complete smoking cessation.  He said he would consider this.  We did inform them that there were various programs that could be arranged to help him quit smoking.  I would expect that he would notice almost complete relief of his preoperative symptoms even though he had mild infrainguinal disease noted.      Impression: #1   Symptomatic bilateral right greater than left claudication secondary to aorto iliac and infrainguinal vascular disease.  Status post aortogram with bilateral runoffs.  Successful angioplasty and stenting of high-grade stenosis of both external arteries.  Mild distal disease as described above that at the present time is not hemodynamically significant.  Recommend follow-up exam with me in 1 month.  Number                        #2    Hyperlipidemia.  LDL was close to goal on his Crestor.  We would recommend LDL less than 70 patient with underlying vascular problem the this is also been addressed..                            #3    Ongoing tobacco abuse.  Strongly recommended complete smoking cessation.    Giorgio Castillo MD

## 2018-03-23 NOTE — CONSULTS
[facility] [place]  Vascular Medicine Consultation         Young Carlos MD,[facility]  Vascular Medicine    Grey Candelario MRN# [id number]   YOB: 1965 Age: 52 year old      Date of Admission:  3/23/2018  Date of Consult: 3/23/2018         Assessment and Plan:     1. PAD with progressively worsening claudication bilateral LE,  RT>left side :   He has Aortoiliac and infrainguinal disease.    This is a very pleasant 52-year-old male with past medical history of hyperlipidemia, hypertension, lifetime smoker experiencing progressively worsening claudication right side worse than left side and recent ADRIAN Dopplers moderate arterial insufficiency underwent bilateral lower extremity angiogram today .   He underwent SMART stent in the right external iliac artery and smart stent in the left external iliac artery as delineated below per Dr. Burch     ''7 mm X 60 mm self expanding SMART stent in right EIA, dilated up to 7 mm.  8 mm X 40 mm self expanding SMART stent in left EIA (dilated up to 7 mm).  Bilateral mild to moderate CFA arterial plaque.  Both SFA, popliteal, and 3 vessel runoff systems are patent.  No complications.  Plavix for 9 months.''    Had a lengthy discussion with the patient optimizing all his risk factors specifically quitting smoking and benefits.  Please see below, he is willing to quit    Reviewed angiogram today discussed with the patient.    At present my recommendations,  Monitor groin access site and CMS.  IV fluids  Bedrest, follow post angioplasty,  Plavix 75 mg daily for 9 months  Increase rosuvastatin 40 mg daily  Assistant quitting smoking as delineated below  Offered PAD exercise program but patient declined  Follow-up with IR for stent surveillance  Follow-up with the doctor Anna in 1 month.      2. Nicotine dependence;  He has been smoking 2 packs of cigarettes daily for decades and tried in the past unsuccessful quitting smoking.  After lengthy discussion with  the patient he is willing to take patches and also Wellbutrin pills.  Initiate Wellbutrin  mg daily for 3 days then followed by twice a day side effects discussed with the patient mostly insomnia.  Initiate NicoDerm 21 mg patches daily for a month and if he makes progress tapered down to 14 mg and get a new prescription per primary MD.  Prescriptions of both Wellbutrin and NicoDerm patches sent to his pharmacy    3. HTN;   reasonably controlled with current lisinopril with hydrochlorothiazide combination continue the same.  DASH diet discussed with the patient and suggested him to quit smoking.  Monitor blood pressure outside.  Avoid NSAIDs, excess alcohol    4. Hyperlipidemia:   He currently takes 20 mg of rosuvastatin daily and LDL is not at goal.  Given bilateral peripheral arterial disease, hypertension and smoker he will benefit with maximum dose of rosuvastatin increase 40 mg daily and new prescription was faxed.  Suggested patient to get lipid panel in 3 months through primary MD.  Therapeutic lifestyle modification suggested    Total patient care time spent today 70 minutes, more than 50% of the time spent counseling of the above-mentioned multiple medical issues.  Reviewed angiogram discussed with consultants.    This note was dictated by utilizing dragon software   Thank you for the consultation    Cc; primary care physician  MD Anna Marroquin MD            Code Status:   Full Code         Primary Care Physician:   Santosh Ortega [phone]         Requesting Physician:      MD Andrea Healy MD         Chief Complaint:   PAD with multiple atherosclerotic risk factors, smoker for vascular risk factor managment    History is obtained from the patient, reviewed EPIC         History of Present Illness:   Grey Candelario is a 52 year old male with past medical history of hypertension, hyperlipidemia, lifetime smoker smokes approximately 2 packs cigarettes daily  experiencing progressively worsening lifestyle limiting claudication right side worse than left side recently seen and evaluated by  and he has a ADRIAN 0.62 on the right side which decreased to 0.53 with exercise and on the left side 0.76 which was decreased to 0.67.  During exercise he also complained of buttock pain right worse than left side.  He underwent bilateral lower extremity angiogram and external iliac artery stent placement as delineated able.  I was asked to manage vascular risk factors.  He denies chest pain, shortness of breath or palpitations.  No foot ulcers or leg ulcers.  Reviewed imaging studies and his LDL is not at goal despite Crestor 20 mg daily.  Blood pressure is reasonable with recent adjustment of antihypertensive medication and takes lisinopril with hydrochlorothiazide.           Past Medical History:     Past Medical History:   Diagnosis Date     HTN (hypertension)      Hyperlipidemia    PAD            Past Surgical History:     Past Surgical History:   Procedure Laterality Date     OTHER SURGICAL HISTORY      hip surgery , hardware in place.              Home Medications:     Prior to Admission medications    Medication Sig Last Dose Taking? Auth Provider   clopidogrel (PLAVIX) 75 MG tablet Take 1 tablet (75 mg) by mouth daily  Yes Ophelia Malagon APRN CNP   lisinopril-hydrochlorothiazide (PRINZIDE/ZESTORETIC) 10-12.5 MG per tablet  3/22/2018 at Unknown time Yes Reported, Patient   rosuvastatin (CRESTOR) 20 MG tablet  3/22/2018 at Unknown time Yes Reported, Patient            Current Medications:                  Allergies:     Allergies   Allergen Reactions     Morphine Hcl Hives            Social History:   Grey Candelario  reports that he has been smoking.  He has been smoking about 2.00 packs per day. He has never used smokeless tobacco. He reports that he drinks alcohol. He reports that he does not use illicit drugs.            Family History:   History reviewed.  "No pertinent family history.            Review of Systems:   The 10 point Review of Systems is negative other than noted in the HPI.             Physical Exam:   Heart Rate: 78, Blood pressure 155/68, pulse 78, temperature 98.1  F (36.7  C), resp. rate 18, height 1.778 m (5' 10\"), weight 70.3 kg (155 lb), SpO2 98 %.  155 lbs 0 oz    Constitutional:  Negative   Psych:  Normal affect and mood   Neuro:  Alert awake oriented ×3 cranial nerves II through XII intact motor sensory function within normal limits   Eyes:  PERRLA, EOMI    ENT:  Normal   Lymph:  No palpable lymphadenopathy   Cardiovascular:  RRR no murmurs no gallop no rub    Lungs:  Clear to auscultation   Abdomen:  Soft, positive bowel sounds no hepatosplenomegaly   Skin:  NAD   Musculoskeletal:  No edema, clubbing or cyanosis   Other:  Postprocedure he has excellent DP and PT pulses, groin access site looks good          Data:   All new lab and imaging data was reviewed.    Results for orders placed or performed during the hospital encounter of 03/23/18   CBC with platelets   Result Value Ref Range    WBC 5.6 4.0 - 11.0 10e9/L    RBC Count 4.03 (L) 4.4 - 5.9 10e12/L    Hemoglobin 13.7 13.3 - 17.7 g/dL    Hematocrit 39.3 (L) 40.0 - 53.0 %    MCV 98 78 - 100 fl    MCH 34.0 (H) 26.5 - 33.0 pg    MCHC 34.9 31.5 - 36.5 g/dL    RDW 12.2 10.0 - 15.0 %    Platelet Count 188 150 - 450 10e9/L   Hemoglobin A1c   Result Value Ref Range    Hemoglobin A1C 4.9 4.3 - 6.0 %   Creatinine   Result Value Ref Range    Creatinine 0.74 0.66 - 1.25 mg/dL    GFR Estimate >90 >60 mL/min/1.7m2    GFR Estimate If Black >90 >60 mL/min/1.7m2   Lipid panel   Result Value Ref Range    Cholesterol 175 <200 mg/dL    Triglycerides 63 <150 mg/dL    HDL Cholesterol 80 >39 mg/dL    LDL Cholesterol Calculated 82 <100 mg/dL    Non HDL Cholesterol 95 <130 mg/dL   INR   Result Value Ref Range    INR 0.96 0.86 - 1.14   Partial thromboplastin time   Result Value Ref Range    PTT 28 22 - 37 sec        "

## 2018-03-23 NOTE — IP AVS SNAPSHOT
MRN:[id number]                      After Visit Summary   3/23/2018    Grey Candelario    MRN: [id number]           Visit Information        Department      3/23/2018  6:09 AM [facility]          Review of your medicines      START taking        Dose / Directions    buPROPion 150 MG 12 hr tablet   Commonly known as:  ZYBAN   Used for:  Cigarette nicotine dependence with other nicotine-induced disorder        Dose:  150 mg   Take 1 tablet (150 mg) by mouth 2 times daily   Quantity:  60 tablet   Refills:  3       clopidogrel 75 MG tablet   Commonly known as:  PLAVIX   Used for:  Atheroscler of native artery of both legs with intermit claudication (H)        Dose:  75 mg   Take 1 tablet (75 mg) by mouth daily   Quantity:  90 tablet   Refills:  3       nicotine 21 MG/24HR 24 hr patch   Commonly known as:  NICODERM CQ   Used for:  Cigarette nicotine dependence with other nicotine-induced disorder        Dose:  1 patch   Place 1 patch onto the skin daily   Quantity:  30 patch   Refills:  1         CONTINUE these medicines which may have CHANGED, or have new prescriptions. If we are uncertain of the size of tablets/capsules you have at home, strength may be listed as something that might have changed.        Dose / Directions    rosuvastatin 40 MG tablet   Commonly known as:  CRESTOR   This may have changed:    - medication strength  - how much to take  - how to take this  - when to take this   Used for:  Hyperlipidemia LDL goal <70        Dose:  40 mg   Take 1 tablet (40 mg) by mouth daily   Quantity:  30 tablet   Refills:  11         CONTINUE these medicines which have NOT CHANGED        Dose / Directions    lisinopril-hydrochlorothiazide 10-12.5 MG per tablet   Commonly known as:  PRINZIDE/ZESTORETIC        Refills:  0            Where to get your medicines      These medications were sent to [facility][id number] - [place] - [address]  [address] [facility]  [address]     Phone:  [phone]     buPROPion 150 MG 12 hr tablet    nicotine 21 MG/24HR 24 hr patch    rosuvastatin 40 MG tablet         Some of these will need a paper prescription and others can be bought over the counter. Ask your nurse if you have questions.     Bring a paper prescription for each of these medications     clopidogrel 75 MG tablet               Prescriptions were sent or printed at these locations (4 Prescriptions)                   [facility] #[id number] - [place], [place] - [address]   [address] [facility] [address]    Telephone:  [phone]   Fax:  [phone]   Hours:                  E-Prescribed (3 of 3)         rosuvastatin (CRESTOR) 40 MG tablet               buPROPion (ZYBAN) 150 MG 12 hr tablet               nicotine (NICODERM CQ) 21 MG/24HR 24 hr patch                     Other Prescriptions                Printed at Department/Unit printer (1 of 1)         clopidogrel (PLAVIX) 75 MG tablet                 Protect others around you: Learn how to safely use, store and throw away your medicines at www.disposemymeds.org.         Follow-ups after your visit         Care Instructions        After Care Instructions     Discharge Instructions       1. Please start taking plavix ( clopidogrel ) 75 mg daily starting tomorrow.    2. Your Crestor ( rosuvastatin ) dose increased to 40 mg daily , New Rx sent ( you can take 2 pills daily of your current supply) start this tomorrow ( you are getting today  dose in the hospital) repeat lipid panel at primary in 3 months.    3. Take Wellbutrin 150 mg daily for next 2 days starting tomorrow then increase to one pill twice  A day. You are getting first dose in the hospital today. This will help quitting smoking    4.use nicoderm patches  21 mg daily for a month then get 14 mg patches from primary in a month    5. Follow up with Dr. Castillo as advised and see Dr. Shepard  Radiologist for stent surveillance                  Further instructions  from your care team       Peripheral Angiogram Discharge Instructions - Femoral     After you go home:      Have an adult stay with you until tomorrow.    Drink extra fluids for 2 days.    You may resume your normal diet.    No smoking       For 24 hours - due to the sedation you received:    Relax and take it easy.    Do NOT make any important or legal decisions.    Do NOT drive or operate machines at home or at work.    Do NOT drink alcohol.    Care of Groin Puncture Site:      For the first 24 hrs - check the puncture site every 1-2 hours while awake.    For 2 days, when you cough, sneeze, laugh or move your bowels, hold your hand over the puncture site and press firmly.    Remove the bandaid after 24 hours. If there is minor oozing, apply another bandaid and remove it after 12 hours.    It is normal to have a small bruise or pea size lump at the site.    You may shower tomorrow.  Do NOT take a bath, or use a hot tub or pool for at least 3 days. Do NOT scrub the site. Do not use lotion or powder near the puncture site.     Activity:            For 2 days:    No stooping or squatting    Do NOT do any heavy activity such as exercise, lifting, or straining.     No housework, yard work or any activity that make you sweat    Do NOT lift more than 10 pounds    Bleeding:      If you start bleeding from the site in your groin, lie down flat and press firmly on/above the site for 10 minutes.     Once bleeding stops, lay flat for 2 hours.     Call the [facility] as soon as you can.       Call 911 right away if you have heavy bleeding or bleeding that does not stop.      Medicines:      If you are taking antiplatelet medications such as Plavix, do not stop taking it until you talk to your provider.       Take your medications, including blood thinners, unless your provider tells you not to.  If you take Coumadin (Warfarin), have your INR checked by your provider in  3-5 days. Call your clinic to schedule  "this.    If you have stopped any medicines, check with your provider about when to restart them.        Follow Up Appointments:      Follow up with [facility] as directed.    Call the clinic if:      You have increased pain or a large or growing hard lump around the site.    The site is red, swollen, hot or tender.    Blood or fluid is draining from the site.    You have chills or a fever greater than 101 F (38 C).    Your leg feels numb, cool or changes color.    You have hives, a rash or unusual itching.    New pain in the back or belly that you cannot control with Tylenol.    Any questions or concerns.    Other Instructions:      You received a stent - carry your stent card with you at all times.      If you have questions or your original symptoms do not improve, call:         [facility] @ [phone]         Additional Information About Your Visit        MyChart Information     [employer] lets you send messages to your doctor, view your test results, renew your prescriptions, schedule appointments and more. To sign up, go to [email or web address] . Click on \"Log in\" on the left side of the screen, which will take you to the Welcome page. Then click on \"Sign up Now\" on the right side of the page.     You will be asked to enter the access code listed below, as well as some personal information. Please follow the directions to create your username and password.     Your access code is: [email or web address]  Expires: 2018  6:11 PM     Your access code will  in 90 days. If you need help or a new code, please call your [facility] clinic or [phone].        Care EveryWhere ID     This is your Care EveryWhere ID. This could be used by other organizations to access your [facility] medical records  [id number]        Your Vitals Were     Blood Pressure Pulse Temperature Respirations Height Weight    149/69 78 98.1  F (36.7  C) 18 1.778 m (5' 10\") 70.3 kg (155 lb)    Pulse Oximetry BMI (Body " Mass Index)                98% 22.24 kg/m2           Primary Care Provider Office Phone # Fax #    Santosh Dupont Sergey Ortega [phone]      Equal Access to Services     SHIRATY NATHAN : Hadii bryan guzman andreso Soyvroseali, waaxda luqadaha, qaybta kaalmada adehannah, abundio elleruche yael. So [facility] [phone].    ATENCIÓN: Si habla español, tiene a grant disposición servicios gratuitos de asistencia lingüística. Llame al [phone].    We comply with applicable federal civil rights laws and [place] laws. We do not discriminate on the basis of race, color, national origin, age, disability, sex, sexual orientation, or gender identity.            Thank you!     Thank you for choosing [facility] for your care. Our goal is always to provide you with excellent care. Hearing back from our patients is one way we can continue to improve our services. Please take a few minutes to complete the written survey that you may receive in the mail after you visit with us. Thank you!             Medication List: This is a list of all your medications and when to take them. Check marks below indicate your daily home schedule. Keep this list as a reference.      Medications           Morning Afternoon Evening Bedtime As Needed    buPROPion 150 MG 12 hr tablet   Commonly known as:  ZYBAN   Take 1 tablet (150 mg) by mouth 2 times daily                                clopidogrel 75 MG tablet   Commonly known as:  PLAVIX   Take 1 tablet (75 mg) by mouth daily                                lisinopril-hydrochlorothiazide 10-12.5 MG per tablet   Commonly known as:  PRINZIDE/ZESTORETIC                                nicotine 21 MG/24HR 24 hr patch   Commonly known as:  NICODERM CQ   Place 1 patch onto the skin daily                                rosuvastatin 40 MG tablet   Commonly known as:  CRESTOR   Take 1 tablet (40 mg) by mouth daily

## 2018-03-28 ENCOUNTER — TELEPHONE (OUTPATIENT)
Dept: OTHER | Facility: CLINIC | Age: 53
End: 2018-03-28

## 2018-03-28 NOTE — TELEPHONE ENCOUNTER
Pt is s/p bilateral external iliac artery stenting on 3/23/18.  Called pt to schedule 1 month follow up appointment.  Pt did not answer, let VM for pt to call back to arrange appointment and provided office phone number.  Belinda Mcelroy, DEANDREN, RN

## 2018-04-03 ENCOUNTER — TELEPHONE (OUTPATIENT)
Dept: OTHER | Facility: CLINIC | Age: 53
End: 2018-04-03

## 2018-04-03 NOTE — TELEPHONE ENCOUNTER
"Pt called back.  Stated he has a lump at his incision site.  Size has not changed over the last few days.  Denies redness, or warmth at site.  Denies fever.  Lump is \"hard\", but not painful.  Recommended patient use ice intermittently.  Recommended tracing the outside of the lump with a pen, to ensure the lump is not increasing in size.  Pt has first post op appointment with Dr. Castillo on 4/19/18.  Encouraged pt to call back if anything changes before appointment.  Pt verbalized understanding and agrees with plan.  Belinda Mcelroy, DEANDREN, RN    "

## 2018-04-03 NOTE — TELEPHONE ENCOUNTER
Reason for Call:  Other - lump at incision site    Detailed comments: While speaking with the patient's significant other to schedule a 1 month follow up s/p bilateral external iliac artery stenting she said that the patient has had a significant lump at the incision site for 4-5 days now. Informed her that I would send a message in to our nursing staff.     Phone Number Patient can be reached at: Home number on file [phone] (home)    Best Time: Any    Can we leave a detailed message on this number? YES    Call taken on 4/3/2018 at 8:19 AM by Mary Baker

## 2018-04-03 NOTE — TELEPHONE ENCOUNTER
Called pt back to discuss concern regarding incision site.  No answer.  Left VM with triage phone number to call back.  DEANDRE RodriguesN, RN

## 2018-04-19 ENCOUNTER — OFFICE VISIT (OUTPATIENT)
Dept: OTHER | Facility: CLINIC | Age: 53
End: 2018-04-19
Attending: SURGERY
Payer: COMMERCIAL

## 2018-04-19 VITALS — HEART RATE: 83 BPM | SYSTOLIC BLOOD PRESSURE: 145 MMHG | DIASTOLIC BLOOD PRESSURE: 71 MMHG

## 2018-04-19 DIAGNOSIS — Z87.891 PERSONAL HISTORY OF TOBACCO USE, PRESENTING HAZARDS TO HEALTH: ICD-10-CM

## 2018-04-19 DIAGNOSIS — I73.9 PAD (PERIPHERAL ARTERY DISEASE) (H): Primary | ICD-10-CM

## 2018-04-19 PROCEDURE — 99213 OFFICE O/P EST LOW 20 MIN: CPT | Mod: ZP | Performed by: SURGERY

## 2018-04-19 PROCEDURE — G0463 HOSPITAL OUTPT CLINIC VISIT: HCPCS

## 2018-04-19 RX ORDER — HYDROCODONE BITARTRATE AND ACETAMINOPHEN 5; 325 MG/1; MG/1
1 TABLET ORAL EVERY 6 HOURS PRN
COMMUNITY
End: 2019-06-06

## 2018-04-19 NOTE — LETTER
[facility]  [address]  [place] [address]  Phone: [phone]  Fax: [phone]    2018    Re: Grey Candelario, : 1965    [facility]     Grey Candelario he had bilateral right greater than left chronic claudication symptoms.  He had an index of 0.62 on the right and 0.76 on the left both decreased with exercise.  He had been smoking up to 2 packs of cigarettes daily.     He underwent angiography on 3/23/2018.  Both iliac arteries had significant stenoses which were successfully stented.  Mild disease was also noted in the right common femoral artery and mid superficial femoral artery with three-vessel runoff.  Somewhat similar disease in the left common femoral and SFA were noted again with excellent runoff.     He is doing very well following the procedure.  Walk much longer distances with the claudication symptoms being resolved.  He still complains of cold feet but this is not vascular in etiology.     We have started him on Plavix and Crestor at the time of his angiogram.  He still smoking up to 1 pack of cigarettes a day but this is decreased from is up to 2 packs daily.  He knows he has to quit smoking completely and we discussed this again today.  In the next several weeks he is visiting his home in [place] and wants to stop smoking until he returns home here.  Again the smoking cessation was stressed.     Exam: Alert and appropriate.  Blood pressure 145/71.  Pulse 83.             Groin sites look good.              Easily +3 palpable dorsalis pedis and posterior tibial pulses bilaterally.     I reviewed the angiographic films with he and his significant other.  Excellent results were noted following the stenting.  He femoral and superficial femoral artery disease is relatively mild which explains why he has excellent palpable pulses and resolution of symptoms following the external iliac artery angioplasty and  stenting.     Impression: Doing very well following bilateral external iliac artery angioplasty and stenting. Only mild distal disease.  My biggest concern is risk factor control.  He is now on Crestor to bring his LDL down to a range of less than 70.  We also placed him on Plavix for several months following the stenting.  Stressed the importance of complete smoking cessation again today.     I will see him again in 3 months with an ADRIAN with exercise.  Duplex evaluation the stenting since they are so large will not be needed for 1 year.     Giorgio Castillo MD

## 2018-04-19 NOTE — NURSING NOTE
"Chief Complaint   Patient presents with     RECHECK     1 month post op, s/p bilateral external iliac artery stenting on 3/23/18        Initial /71 (BP Location: Right arm, Patient Position: Chair, Cuff Size: Adult Regular)  Pulse 83 Estimated body mass index is 22.24 kg/(m^2) as calculated from the following:    Height as of 3/23/18: 5' 10\" (1.778 m).    Weight as of 3/23/18: 155 lb (70.3 kg).  Medication Reconciliation: complete     Trina Neal MA   "

## 2018-04-19 NOTE — PROGRESS NOTES
[facility]    Grey Candelario he had bilateral right greater than left chronic claudication symptoms.  He had an index of 0.62 on the right and 0.76 on the left both decreased with exercise.  He had been smoking up to 2 packs of cigarettes daily.      He underwent angiography on 3/23/2018.  Both iliac arteries had significant stenoses which were successfully stented.  Mild disease was also noted in the right common femoral artery and mid superficial femoral artery with three-vessel runoff.  Somewhat similar disease in the left common femoral and SFA were noted again with excellent runoff.      He is doing very well following the procedure.  Walk much longer distances with the claudication symptoms being resolved.  He still complains of cold feet but this is not vascular in etiology.    We have started him on Plavix and Crestor at the time of his angiogram.  He still smoking up to 1 pack of cigarettes a day but this is decreased from is up to 2 packs daily.  He knows he has to quit smoking completely and we discussed this again today.  In the next several weeks he is visiting his home in [place] and wants to stop smoking until he returns home here.  Again the smoking cessation was stressed.    Exam: Alert and appropriate.  Blood pressure 145/71.  Pulse 83.             Groin sites look good.              Easily +3 palpable dorsalis pedis and posterior tibial pulses bilaterally.      I reviewed the angiographic films with he and his significant other.  Excellent results were noted following the stenting.  He femoral and superficial femoral artery disease is relatively mild which explains why he has excellent palpable pulses and resolution of symptoms following the external iliac artery angioplasty and stenting.      Impression: Doing very well following bilateral external iliac artery angioplasty and stenting.  Only mild distal disease.  My biggest concern is risk factor control.  He is now on  Crestor to bring his LDL down to a range of less than 70.  We also placed him on Plavix for several months following the stenting.  Stressed the importance of complete smoking cessation again today.                        I will see him again in 3 months with an ADRIAN with exercise.  Duplex evaluation the stenting since they are so large will not be needed for 1 year.      Giorgio Castillo MD       Please route or send letter to:     Dr Andrea Shepard  IR

## 2018-04-19 NOTE — MR AVS SNAPSHOT
"              After Visit Summary   4/19/2018    Grey Candelario    MRN: [id number]           Patient Information     Date Of Birth          1965        Visit Information        Provider Department      4/19/2018 4:00 PM Giorgio Castillo MD [facility]      Today's Diagnoses     PAD (peripheral artery disease) (H)    -  1    Personal history of tobacco use, presenting hazards to health           Follow-ups after your visit        Follow-up notes from your care team     Return in about 3 months (around 7/19/2018).      Future tests that were ordered for you today     Open Future Orders        Priority Expected Expires Ordered    US ADRIAN Doppler with Exercise Bilateral Routine 7/1/2018 4/19/2019 4/19/2018            Who to contact     If you have questions or need follow up information about today's clinic visit or your schedule please contact [facility] directly at [phone].  Normal or non-critical lab and imaging results will be communicated to you by MyChart, letter or phone within 4 business days after the clinic has received the results. If you do not hear from us within 7 days, please contact the clinic through [employer] or phone. If you have a critical or abnormal lab result, we will notify you by phone as soon as possible.  Submit refill requests through [employer] or call your pharmacy and they will forward the refill request to us. Please allow 3 business days for your refill to be completed.          Additional Information About Your Visit        [employer] Information     [employer] lets you send messages to your doctor, view your test results, renew your prescriptions, schedule appointments and more. To sign up, go to [email or web address] . Click on \"Log in\" on the left side of the screen, which will take you to the Welcome page. Then click on \"Sign up Now\" on the right side of the page.     You will be asked " to enter the access code listed below, as well as some personal information. Please follow the directions to create your username and password.     Your access code is: [email or web address]  Expires: 2018  6:11 PM     Your access code will  in 90 days. If you need help or a new code, please call your [facility] clinic or [phone].        Care EveryWhere ID     This is your Care EveryWhere ID. This could be used by other organizations to access your [facility] medical records  [id number]        Your Vitals Were     Pulse                   83            Blood Pressure from Last 3 Encounters:   18 145/71   18 155/70   18 155/77    Weight from Last 3 Encounters:   18 155 lb (70.3 kg)   18 155 lb (70.3 kg)   13 160 lb (72.6 kg)              Today, you had the following     No orders found for display      Information about OPIOIDS     PRESCRIPTION OPIOIDS: WHAT YOU NEED TO KNOW   You have a prescription for an opioid (narcotic) pain medicine. Opioids can cause addiction. If you have a history of chemical dependency of any type, you are at a higher risk of becoming addicted to opioids. Only take this medicine after all other options have been tried. Take it for as short a time and as few doses as possible.     Do not:    Drive. If you drive while taking these medicines, you could be arrested for driving under the influence (DUI).    Operate heavy machinery    Do any other dangerous activities while taking these medicines.     Drink any alcohol while taking these medicines.      Take with any other medicines that contain acetaminophen. Read all labels carefully. Look for the word  acetaminophen  or  Tylenol.  Ask your pharmacist if you have questions or are unsure.    Store your pills in a secure place, locked if possible. We will not replace any lost or stolen medicine. If you don t finish your medicine, please throw away (dispose) as directed by your pharmacist. The [place]  Pollution Control Agency has more information about safe disposal: https://www.pca.[facility].[place].us/living-green/managing-unwanted-medications    All opioids tend to cause constipation. Drink plenty of water and eat foods that have a lot of fiber, such as fruits, vegetables, prune juice, apple juice and high-fiber cereal. Take a laxative (Miralax, milk of magnesia, Colace, Senna) if you don t move your bowels at least every other day.          Primary Care Provider Office Phone # Fax #    Santosh Cresencio Ortega [phone]       [place] [employer] [facility] [address]    [facility] [address]        Equal Access to Services     FILEMON EVANS : Hadii bryan camposo Sorhonda, waaxda luqadaha, qaybta kaalmada adeegyada, abundio traylor . So [place] [phone].    ATENCIÓN: Si habla español, tiene a grant disposición servicios gratuitos de asistencia lingüística. Llame al [phone].    We comply with applicable federal civil rights laws and [place] laws. We do not discriminate on the basis of race, color, national origin, age, disability, sex, sexual orientation, or gender identity.            Thank you!     Thank you for choosing [facility] [place]  for your care. Our goal is always to provide you with excellent care. Hearing back from our patients is one way we can continue to improve our services. Please take a few minutes to complete the written survey that you may receive in the mail after your visit with us. Thank you!             Your Updated Medication List - Protect others around you: Learn how to safely use, store and throw away your medicines at www.disposemymeds.org.          This list is accurate as of 4/19/18  5:52 PM.  Always use your most recent med list.                   Brand Name Dispense Instructions for use Diagnosis    buPROPion 150 MG 12 hr tablet    ZYBAN    60 tablet    Take 1 tablet (150 mg) by mouth 2 times daily    Cigarette  nicotine dependence with other nicotine-induced disorder       clopidogrel 75 MG tablet    PLAVIX    90 tablet    Take 1 tablet (75 mg) by mouth daily    Atheroscler of native artery of both legs with intermit claudication (H)       HYDROcodone-acetaminophen 5-325 MG per tablet    NORCO     Take 1 tablet by mouth every 6 hours as needed for severe pain        lisinopril-hydrochlorothiazide 10-12.5 MG per tablet    PRINZIDE/ZESTORETIC          nicotine 21 MG/24HR 24 hr patch    NICODERM CQ    30 patch    Place 1 patch onto the skin daily    Cigarette nicotine dependence with other nicotine-induced disorder       rosuvastatin 40 MG tablet    CRESTOR    30 tablet    Take 1 tablet (40 mg) by mouth daily    Hyperlipidemia LDL goal <70

## 2018-07-19 ENCOUNTER — HOSPITAL ENCOUNTER (OUTPATIENT)
Dept: ULTRASOUND IMAGING | Facility: CLINIC | Age: 53
Discharge: HOME | End: 2018-07-19
Attending: SURGERY | Admitting: SURGERY
Payer: COMMERCIAL

## 2018-07-19 ENCOUNTER — OFFICE VISIT (OUTPATIENT)
Dept: OTHER | Facility: CLINIC | Age: 53
End: 2018-07-19
Attending: SURGERY
Payer: COMMERCIAL

## 2018-07-19 VITALS — HEART RATE: 84 BPM | DIASTOLIC BLOOD PRESSURE: 74 MMHG | SYSTOLIC BLOOD PRESSURE: 146 MMHG

## 2018-07-19 DIAGNOSIS — I73.9 PAD (PERIPHERAL ARTERY DISEASE) (H): Primary | ICD-10-CM

## 2018-07-19 DIAGNOSIS — Z87.891 PERSONAL HISTORY OF TOBACCO USE, PRESENTING HAZARDS TO HEALTH: ICD-10-CM

## 2018-07-19 DIAGNOSIS — I73.9 PAD (PERIPHERAL ARTERY DISEASE) (H): ICD-10-CM

## 2018-07-19 PROCEDURE — 99214 OFFICE O/P EST MOD 30 MIN: CPT | Mod: ZP | Performed by: SURGERY

## 2018-07-19 PROCEDURE — G0463 HOSPITAL OUTPT CLINIC VISIT: HCPCS

## 2018-07-19 PROCEDURE — 93924 LWR XTR VASC STDY BILAT: CPT

## 2018-07-19 NOTE — NURSING NOTE
"Grey Candelario is a 52 year old male who presents for:  Chief Complaint   Patient presents with     RECHECK     ADRIAN w/ex (2:45 VHC, 3:30 WRO) History of bilateral external iliac stenting on 3/23/18; 3-4 month follow up to 4/19/18 appt with Dr. Castillo.         Vitals:    Vitals:    07/19/18 1532   BP: 144/79   BP Location: Left arm   Patient Position: Chair   Cuff Size: Adult Large   Pulse: 84       BMI:  Estimated body mass index is 22.24 kg/(m^2) as calculated from the following:    Height as of 3/23/18: 5' 10\" (1.778 m).    Weight as of 3/23/18: 155 lb (70.3 kg).    Pain Score:  Data Unavailable        Trina Neal"

## 2018-07-19 NOTE — MR AVS SNAPSHOT
After Visit Summary   7/19/2018    Grey Candelario    MRN: [id number]           Patient Information     Date Of Birth          1965        Visit Information        Provider Department      7/19/2018 3:30 PM Giorgio Castillo MD [facility]       Follow-ups after your visit        Your next 10 appointments already scheduled     Jul 25, 2018  2:30 PM CDT   US LOWER EXTREMITY ARTERIAL DUPLEX BILATERAL with VUS1   [facility]ound ([facility])    [address]  [place] [address]   [phone]           Please bring a list of your medicines (including vitamins, minerals and over-the-counter drugs). Also, tell your doctor about any allergies you may have. Wear comfortable clothes and leave your valuables at home.  You do not need to do anything special to prepare for your exam.  Please call the Imaging Department at your exam site with any questions.              Future tests that were ordered for you today     Open Future Orders        Priority Expected Expires Ordered    US Lower Extremity Arterial Duplex Bilateral Routine 7/19/2018 7/19/2019 7/19/2018            Who to contact     If you have questions or need follow up information about today's clinic visit or your schedule please contact [facility] directly at [phone].  Normal or non-critical lab and imaging results will be communicated to you by MyChart, letter or phone within 4 business days after the clinic has received the results. If you do not hear from us within 7 days, please contact the clinic through MyChart or phone. If you have a critical or abnormal lab result, we will notify you by phone as soon as possible.  Submit refill requests through [employer] or call your pharmacy and they will forward the refill request to us. Please allow 3 business days for your refill to  be completed.          Additional Information About Your Visit        Care EveryWhere ID     This is your Care EveryWhere ID. This could be used by other organizations to access your [facility] medical records  [id number]        Your Vitals Were     Pulse                   84            Blood Pressure from Last 3 Encounters:   07/19/18 146/74   04/19/18 145/71   03/23/18 155/70    Weight from Last 3 Encounters:   03/23/18 155 lb (70.3 kg)   03/08/18 155 lb (70.3 kg)   07/21/13 160 lb (72.6 kg)              Today, you had the following     No orders found for display       Primary Care Provider Office Phone # Fax #    Santosh Cresencio Ortega [phone]       [place] [employer] [facility] [address]    [facility] [address]        Equal Access to Services     [facility] NATHAN : Hadii aad ku hadasho Soomaali, waaxda luqadaha, qaybta kaalmada adeegyada, abundio traylor . So [facility] [phone].    ATENCIÓN: Si habla español, tiene a grant disposición servicios gratuitos de asistencia lingüística. Terence al [phone].    We comply with applicable federal civil rights laws and [place] laws. We do not discriminate on the basis of race, color, national origin, age, disability, sex, sexual orientation, or gender identity.            Thank you!     Thank you for choosing [facility] [place]  for your care. Our goal is always to provide you with excellent care. Hearing back from our patients is one way we can continue to improve our services. Please take a few minutes to complete the written survey that you may receive in the mail after your visit with us. Thank you!             Your Updated Medication List - Protect others around you: Learn how to safely use, store and throw away your medicines at www.disposemymeds.org.          This list is accurate as of 7/19/18  4:17 PM.  Always use your most recent med list.                   Brand Name Dispense Instructions  for use Diagnosis    buPROPion 150 MG 12 hr tablet    ZYBAN    60 tablet    Take 1 tablet (150 mg) by mouth 2 times daily    Cigarette nicotine dependence with other nicotine-induced disorder       clopidogrel 75 MG tablet    PLAVIX    90 tablet    Take 1 tablet (75 mg) by mouth daily    Atheroscler of native artery of both legs with intermit claudication (H)       HYDROcodone-acetaminophen 5-325 MG per tablet    NORCO     Take 1 tablet by mouth every 6 hours as needed for severe pain        lisinopril-hydrochlorothiazide 10-12.5 MG per tablet    PRINZIDE/ZESTORETIC          nicotine 21 MG/24HR 24 hr patch    NICODERM CQ    30 patch    Place 1 patch onto the skin daily    Cigarette nicotine dependence with other nicotine-induced disorder       rosuvastatin 40 MG tablet    CRESTOR    30 tablet    Take 1 tablet (40 mg) by mouth daily    Hyperlipidemia LDL goal <70

## 2018-07-19 NOTE — PROGRESS NOTES
[place] [facility]    Grey Candelario returns for vascular follow-up.  He had short distance bilateral leg claudication symptoms.  He underwent angiography with bilateral external iliac artery angioplasty and stenting.  Femoral vessels and runoff arteries were completely normal.    Since the procedure he has had significant improvement of his claudication symptoms.  He is able to walk at work with no difficulty.  He has been able to cut his smoking down to approximately one half pack cigarettes daily is trying to quit completely.      PMH: Medications: Plavix, lisinopril/HCTZ, Crestor, Zyban and NicoDerm            Lives independently.  Works full-time.  Job requires walking.    Exam: Alert and appropriate.  Blood pressure 144/79.  Pulse 84.              Very pleasant.  Normal affect.               Chest= clear.   Cardiovascular=RR               Extremities= warm to touch, normal sensation, no swelling                           +3 palpable posterior tibial and dorsalis pedis pulses bilaterally.      Ankle-brachial index is improved but not normal.  This is now 0.8 on the right and 0.8 on the left with a triphasic or biphasic signal.  With exercises decreases to 0.54 on the right and 0.33 on the left with calf tightness in the right side but not in the left.              LDL= 82 at the time of his angiogram and March.  On a statin.  Should be followed with a goal of an LDL <70      I reviewed the angiographic films from March 2018 with the patient.  This shows the iliac disease.  Cough is completely normal in the right.  There may be a 50% stenosis of the left distal SFA and mild disease of the origin of the left anterior tibial artery.      Impression: Significant improvement of claudication symptoms with improved blood flow.  However, ABIs are abnormal and I would have expected those to be normal and there is also significant decrease with exercise.  This may represent in-stent restenosis of the iliac  stents.  To evaluate this further we will perform a duplex ultrasound of the iliac stents to see if there is recurrent stenosis that may require intervention.                        Ongoing smoking but significantly improved.  Will continue work on complete smoking cessation we discussed this again today.  We spent over 25 minutes in the office today discussing the situation and reviewing the previous studies.      Giorgio Castillo MD   Please route or send letter to:  Primary Care Provider (PCP)

## 2018-07-19 NOTE — LETTER
[facility]  [address]  [place] [address]  Phone: [phone]  Fax: [phone]            2018    RE: Grey Candelario, :  1965      [facility]     Grey Candelario returns for vascular follow-up.  He had short distance bilateral leg claudication symptoms.  He underwent angiography with bilateral external iliac artery angioplasty and stenting.  Femoral vessels and runoff arteries were completely normal.     Since the procedure he has had significant improvement of his claudication symptoms.  He is able to walk at work with no difficulty.  He has been able to cut his smoking down to approximately one half pack cigarettes daily is trying to quit completely.      PMH: Medications: Plavix, lisinopril/HCTZ, Crestor, Zyban and NicoDerm            Lives independently.  Works full-time.  Job requires walking.     Exam: Alert and appropriate.  Blood pressure 144/79.  Pulse 84.              Very pleasant.  Normal affect.               Chest= clear.   Cardiovascular=RR               Extremities= warm to touch, normal sensation, no swelling                           +3 palpable posterior tibial and dorsalis pedis pulses bilaterally.      Ankle-brachial index is improved but not normal.  This is now 0.8 on the right and 0.8 on the left with a triphasic or biphasic signal.  With exercises decreases to 0.54 on the right and 0.33 on the left with calf tightness in the right side but not in the left.       LDL= 82 at the time of his angiogram and March.  On a statin.  Should be followed with a goal of an LDL <70      I reviewed the angiographic films from 2018 with the patient.  This shows the iliac disease.  Cough is completely normal in the right.  There may be a 50% stenosis of the left distal SFA and mild disease of the origin of the left anterior tibial artery.      Impression: Significant improvement of claudication symptoms with  improved blood flow.  However, ABIs are abnormal and I would have expected those to be normal and there is also significant decrease with exercise.  This may represent in-stent restenosis of the iliac stents.  To evaluate this further we will perform a duplex ultrasound of the iliac stents to see if there is recurrent stenosis that may require intervention.                         Ongoing smoking but significantly improved.  Will continue work on complete smoking cessation we discussed this again today.  We spent over 25 minutes in the office today discussing the situation and reviewing the previous studies.       Sincerely,     Giorgio Castillo MD               [facility]  [address] [place] [address] [place] [address] . [address]  [facility] [address]  Phone: [phone]  Fax: [phone]

## 2018-07-25 ENCOUNTER — HOSPITAL ENCOUNTER (OUTPATIENT)
Dept: ULTRASOUND IMAGING | Facility: CLINIC | Age: 53
Discharge: HOME | End: 2018-07-25
Attending: SURGERY | Admitting: SURGERY
Payer: COMMERCIAL

## 2018-07-25 DIAGNOSIS — I73.9 PAD (PERIPHERAL ARTERY DISEASE) (H): ICD-10-CM

## 2018-07-25 PROCEDURE — 93925 LOWER EXTREMITY STUDY: CPT

## 2018-07-27 ENCOUNTER — TELEPHONE (OUTPATIENT)
Dept: OTHER | Facility: CLINIC | Age: 53
End: 2018-07-27

## 2018-07-27 DIAGNOSIS — I73.9 PAD (PERIPHERAL ARTERY DISEASE) (H): Primary | ICD-10-CM

## 2018-07-27 NOTE — TELEPHONE ENCOUNTER
[place] [facility]    I called Grey Candelario about his iliac stent Duplex.  Both are widely patent (we had concerns due to his recent ABIs with exercise) with no in-stent stenosis. I left a message for him.      Giorgio Castillo MD

## 2018-08-09 NOTE — TELEPHONE ENCOUNTER
Attempted to contact pt to follow up regarding Dr. Castillo's message that he had left for pt re: pt's duplex studies.  LVM requesting pt to call with any questions and to inform pt he will be sent a letter to be seen again in approximately 4 months with a repeat ADRIAN with exercise.  Provided clinic phone number.  Belinda Mcelroy, DEANDREN, RN

## 2018-08-10 ENCOUNTER — HOSPITAL ENCOUNTER (EMERGENCY)
Facility: CLINIC | Age: 53
Discharge: HOME | End: 2018-08-10
Attending: EMERGENCY MEDICINE | Admitting: EMERGENCY MEDICINE
Payer: COMMERCIAL

## 2018-08-10 ENCOUNTER — APPOINTMENT (OUTPATIENT)
Dept: GENERAL RADIOLOGY | Facility: CLINIC | Age: 53
End: 2018-08-10
Attending: EMERGENCY MEDICINE
Payer: COMMERCIAL

## 2018-08-10 ENCOUNTER — APPOINTMENT (OUTPATIENT)
Dept: CT IMAGING | Facility: CLINIC | Age: 53
End: 2018-08-10
Attending: EMERGENCY MEDICINE
Payer: COMMERCIAL

## 2018-08-10 VITALS
RESPIRATION RATE: 18 BRPM | HEIGHT: 70 IN | DIASTOLIC BLOOD PRESSURE: 86 MMHG | OXYGEN SATURATION: 96 % | TEMPERATURE: 97.8 F | SYSTOLIC BLOOD PRESSURE: 161 MMHG | WEIGHT: 155 LBS | BODY MASS INDEX: 22.19 KG/M2

## 2018-08-10 DIAGNOSIS — J98.4 INFLAMMATION OF LUNG: ICD-10-CM

## 2018-08-10 DIAGNOSIS — R07.81 PLEURITIC CHEST PAIN: ICD-10-CM

## 2018-08-10 LAB
ANION GAP SERPL CALCULATED.3IONS-SCNC: 10 MMOL/L (ref 3–14)
BASOPHILS # BLD AUTO: 0 10E9/L (ref 0–0.2)
BASOPHILS NFR BLD AUTO: 0.3 %
BUN SERPL-MCNC: 15 MG/DL (ref 7–30)
CALCIUM SERPL-MCNC: 8.9 MG/DL (ref 8.5–10.1)
CHLORIDE SERPL-SCNC: 101 MMOL/L (ref 94–109)
CO2 SERPL-SCNC: 25 MMOL/L (ref 20–32)
CREAT SERPL-MCNC: 0.86 MG/DL (ref 0.66–1.25)
D DIMER PPP FEU-MCNC: 1 UG/ML FEU (ref 0–0.5)
DIFFERENTIAL METHOD BLD: ABNORMAL
EOSINOPHIL # BLD AUTO: 0.2 10E9/L (ref 0–0.7)
EOSINOPHIL NFR BLD AUTO: 2.4 %
ERYTHROCYTE [DISTWIDTH] IN BLOOD BY AUTOMATED COUNT: 12.7 % (ref 10–15)
GFR SERPL CREATININE-BSD FRML MDRD: >90 ML/MIN/1.7M2
GLUCOSE SERPL-MCNC: 88 MG/DL (ref 70–99)
HCT VFR BLD AUTO: 37.1 % (ref 40–53)
HGB BLD-MCNC: 13.3 G/DL (ref 13.3–17.7)
IMM GRANULOCYTES # BLD: 0 10E9/L (ref 0–0.4)
IMM GRANULOCYTES NFR BLD: 0.1 %
INTERPRETATION ECG - MUSE: NORMAL
LYMPHOCYTES # BLD AUTO: 2.6 10E9/L (ref 0.8–5.3)
LYMPHOCYTES NFR BLD AUTO: 32.3 %
MCH RBC QN AUTO: 34.3 PG (ref 26.5–33)
MCHC RBC AUTO-ENTMCNC: 35.8 G/DL (ref 31.5–36.5)
MCV RBC AUTO: 96 FL (ref 78–100)
MONOCYTES # BLD AUTO: 0.9 10E9/L (ref 0–1.3)
MONOCYTES NFR BLD AUTO: 10.8 %
NEUTROPHILS # BLD AUTO: 4.3 10E9/L (ref 1.6–8.3)
NEUTROPHILS NFR BLD AUTO: 54.1 %
NRBC # BLD AUTO: 0 10*3/UL
NRBC BLD AUTO-RTO: 0 /100
PLATELET # BLD AUTO: 208 10E9/L (ref 150–450)
POTASSIUM SERPL-SCNC: 3.9 MMOL/L (ref 3.4–5.3)
RBC # BLD AUTO: 3.88 10E12/L (ref 4.4–5.9)
SODIUM SERPL-SCNC: 136 MMOL/L (ref 133–144)
TROPONIN I SERPL-MCNC: 0.02 UG/L (ref 0–0.04)
WBC # BLD AUTO: 7.9 10E9/L (ref 4–11)

## 2018-08-10 PROCEDURE — 25000125 ZZHC RX 250: Performed by: EMERGENCY MEDICINE

## 2018-08-10 PROCEDURE — 85025 COMPLETE CBC W/AUTO DIFF WBC: CPT | Performed by: EMERGENCY MEDICINE

## 2018-08-10 PROCEDURE — 99285 EMERGENCY DEPT VISIT HI MDM: CPT | Mod: 25

## 2018-08-10 PROCEDURE — 93005 ELECTROCARDIOGRAM TRACING: CPT

## 2018-08-10 PROCEDURE — 96374 THER/PROPH/DIAG INJ IV PUSH: CPT | Mod: 59

## 2018-08-10 PROCEDURE — 25000128 H RX IP 250 OP 636: Performed by: EMERGENCY MEDICINE

## 2018-08-10 PROCEDURE — 80048 BASIC METABOLIC PNL TOTAL CA: CPT | Performed by: EMERGENCY MEDICINE

## 2018-08-10 PROCEDURE — 71046 X-RAY EXAM CHEST 2 VIEWS: CPT

## 2018-08-10 PROCEDURE — 96375 TX/PRO/DX INJ NEW DRUG ADDON: CPT | Mod: 59

## 2018-08-10 PROCEDURE — 96376 TX/PRO/DX INJ SAME DRUG ADON: CPT

## 2018-08-10 PROCEDURE — 84484 ASSAY OF TROPONIN QUANT: CPT | Performed by: EMERGENCY MEDICINE

## 2018-08-10 PROCEDURE — 71260 CT THORAX DX C+: CPT

## 2018-08-10 PROCEDURE — 85379 FIBRIN DEGRADATION QUANT: CPT | Performed by: EMERGENCY MEDICINE

## 2018-08-10 RX ORDER — FENTANYL CITRATE 50 UG/ML
50 INJECTION, SOLUTION INTRAMUSCULAR; INTRAVENOUS ONCE
Status: COMPLETED | OUTPATIENT
Start: 2018-08-10 | End: 2018-08-10

## 2018-08-10 RX ORDER — HYDROCODONE BITARTRATE AND ACETAMINOPHEN 5; 325 MG/1; MG/1
1-2 TABLET ORAL EVERY 4 HOURS PRN
Qty: 18 TABLET | Refills: 0 | Status: SHIPPED | OUTPATIENT
Start: 2018-08-10 | End: 2019-06-06

## 2018-08-10 RX ORDER — HYDROMORPHONE HYDROCHLORIDE 1 MG/ML
0.5 INJECTION, SOLUTION INTRAMUSCULAR; INTRAVENOUS; SUBCUTANEOUS ONCE
Status: COMPLETED | OUTPATIENT
Start: 2018-08-10 | End: 2018-08-10

## 2018-08-10 RX ORDER — DOXYCYCLINE 100 MG/1
100 CAPSULE ORAL 2 TIMES DAILY
Qty: 20 CAPSULE | Refills: 0 | Status: SHIPPED | OUTPATIENT
Start: 2018-08-10 | End: 2018-08-20

## 2018-08-10 RX ORDER — IOPAMIDOL 755 MG/ML
61 INJECTION, SOLUTION INTRAVASCULAR ONCE
Status: COMPLETED | OUTPATIENT
Start: 2018-08-10 | End: 2018-08-10

## 2018-08-10 RX ADMIN — FENTANYL CITRATE 50 MCG: 50 INJECTION INTRAMUSCULAR; INTRAVENOUS at 20:06

## 2018-08-10 RX ADMIN — SODIUM CHLORIDE, PRESERVATIVE FREE 87 ML: 5 INJECTION INTRAVENOUS at 19:35

## 2018-08-10 RX ADMIN — Medication 0.5 MG: at 18:15

## 2018-08-10 RX ADMIN — IOPAMIDOL 61 ML: 755 INJECTION, SOLUTION INTRAVENOUS at 19:35

## 2018-08-10 RX ADMIN — FENTANYL CITRATE 50 MCG: 50 INJECTION INTRAMUSCULAR; INTRAVENOUS at 18:39

## 2018-08-10 ASSESSMENT — ENCOUNTER SYMPTOMS
WEAKNESS: 0
ARTHRALGIAS: 1

## 2018-08-10 NOTE — ED AVS SNAPSHOT
Emergency Department    [address] [facility] [address]    Phone:  [phone]    Fax:  [phone]                                       Grey Candelario   MRN: [id number]    Department:   Emergency Department   Date of Visit:  8/10/2018           After Visit Summary Signature Page     I have received my discharge instructions, and my questions have been answered. I have discussed any challenges I see with this plan with the nurse or doctor.    ..........................................................................................................................................  Patient/Patient Representative Signature      ..........................................................................................................................................  Patient Representative Print Name and Relationship to Patient    ..................................................               ................................................  Date                                            Time    ..........................................................................................................................................  Reviewed by Signature/Title    ...................................................              ..............................................  Date                                                            Time

## 2018-08-10 NOTE — ED PROVIDER NOTES
History     Chief Complaint:  Chest Pain (started this morning, sob, Angio with stent placement done in Apr 2018.)      SHENG Candelario is a pleasant 53 year old male with a history of stents in the arteries in both legs who presents with his significant other for evaluation of chest pain. The patient reports that he had his first onset of chest pain last week. He reports that the pain was sharp, sudden onset but was short lived. The patient reports that he had onset of a similar chest pain this morning. He reports that the pain has been sharp and constant since onset. He also notes that he has felt some pain radiating into his left shoulder and arm as well.    The patient comes in with secondary concerns of shortness of breath with difficulty taking a deep breath, and feeling like his blood pressure is elevated. The patient reports that he took a double dose of his blood pressure medication this morning which he suspects could also be a contributing factor to his pain. He denies trauma to chest or ribs. He denies coughing up blood. He denies feeling faint. He denies black or bloody stools.     Risk factors:   The patient has a history of hypertension and history of PAD. He is a longtime smoker. He reports that he travelled to [place] in May.      Allergies:  Morphine     Medications:    Zyban  Plavix  Lisinopril  crestor  Norco  Nicoderm    Past Medical History:    hypertension   Hx of heart artery stent  Hyperlipidemia    Past Surgical History:    Hip surgery, hardware in place.     Family History:    No family history on file.    Social History:  The patient  reports that he has been smoking.  He has been smoking about 2.00 packs per day. He has never used smokeless tobacco. He reports that he drinks alcohol. He reports that he does not use illicit drugs.   Marital Status:  Single [1]     Review of Systems   Cardiovascular: Positive for chest pain.   Musculoskeletal: Positive for arthralgias.  "  Neurological: Negative for weakness.   All other systems reviewed and are negative.    Physical Exam   First Vitals:  BP: 168/81  Heart Rate: 93  Temp: 97.8  F (36.6  C)  Resp: 22  Height: 177.8 cm (5' 10\")  Weight: 70.3 kg (155 lb)  SpO2: 100 %      Physical Exam  SKIN:  Warm, dry.  HEMATOLOGIC/IMMUNOLOGIC/LYMPHATIC:  No pallor. No edema.  HENT:  No JVD.  CARDIOVASCULAR:  Regular rate and rhythm, no murmur.  No rub.  Radial pulses equal and normal.  RESPIRATORY:  No respiratory distress, breath sounds equal and normal.  Inspiration exacerbates the chest pain.  GASTROINTESTINAL:  Soft, nontender abdomen.  No mass or distension.    MUSCULOSKELETAL:  Normal body habitus.  NEUROLOGIC:  Alert, conversant.  PSYCHIATRIC:  Normal mood.    Emergency Department Course   ECG (16:25:03):  Rate 81 bpm. ID interval 144. QRS duration 86. QT/QTc 366/425. P-R-T axes 74 76 75. Normal sinus rhythm. Possible left atrial enlargement. Left ventricular hypertrophy. Abnormal EKG. Interpreted at 1749 by Chino Ramirez MD.     Imaging:  Chest CT, IV contrast only - PE protocol   Final Result   IMPRESSION:    1. No pulmonary embolism or acute thoracic aortic abnormality.   Coronary artery calcifications noted.   2. Small focal area of consolidation at the lingula. This could   represent a small area of airspace disease such as pneumonia. As it is   nodular it is technically indeterminate. Recommend follow-up CT chest   in three months to ensure resolution.   3. Peribronchial wall thickening and areas of presumed mucus   impaction. One focal area of small bronchial filling defect is at the   right upper lobe. Although this could represent mucus impaction, other   etiologies not entirely excluded based on imaging alone, including   neoplastic cause.      DARYL NEWMAN MD      Chest XR,  PA & LAT   Final Result   IMPRESSION: No acute cardiopulmonary abnormalities.      DUSTIN VALENCIA MD        Laboratory:  CBC: RBC 3.88L, 'HCT 37.1L, MCH " 34.3H, otherwise within normal limits   BMP: WNL  Troponin 0.023  D dimer 1.0H    Interventions:  1815: Dilaudid 0.5mg  1839: Fentanyl 50mcg IV  2006: Fentanyl 50mcg  2006: 0.9 % PF flush        Emergency Department Course:  Past medical records, nursing notes, and vitals reviewed.  1740: I performed an exam of the patient and obtained history, as documented above.       IV inserted and blood drawn for the above work up to be conducted.     Rechecked the patient, findings and plan explained to the patient. Patient discharged home, status improved, with instructions regarding supportive care, medications, and reasons to return as well as the importance of close follow-up was reviewed.   Impression & Plan    Medical Decision Making:  Grey Candelario is a 53 year old male who presents with pleuritic left sided chest pain.  Above evaluation performed revealing inflammation of the lingula of the left lung. This would correlate with his area of pain, possibly inflammatory from infectious vs neoplastic disease.  The patient was treated as above and had improvement with our interventions. I think he can be discharged. He agreed. I advised to take doxycycline in case he is suffering pneumonia and to follow up. Norco for pain. Advised this may be neoplastic and he may need further imaging if not improving.     Diagnosis:    ICD-10-CM    1. Inflammation of lung J18.9    2. Pleuritic chest pain R07.81        Disposition:  discharged to home    Discharge Medications:  Discharge Medication List as of 8/10/2018  8:39 PM      START taking these medications    Details   doxycycline (VIBRAMYCIN) 100 MG capsule Take 1 capsule (100 mg) by mouth 2 times daily for 10 days, Disp-20 capsule, R-0, Local Print      !! HYDROcodone-acetaminophen (NORCO) 5-325 MG per tablet Take 1-2 tablets by mouth every 4 hours as needed for pain, Disp-18 tablet, R-0, Local Print       !! - Potential duplicate medications found. Please discuss with provider.         I, Nickesa Carlos, am serving as a scribe at 5:40 PM on 8/10/2018 to document services personally performed by Chino Ramirez MD based on my observations and the provider's statements to me.     EMERGENCY DEPARTMENT       Chino Ramirez MD  08/11/18 1658

## 2018-08-10 NOTE — ED AVS SNAPSHOT
Emergency Department    [address] [facility] [address]    Phone:  [phone]    Fax:  [phone]                                       Grey Candelario   MRN: [id number]    Department:   Emergency Department   Date of Visit:  8/10/2018           Patient Information     Date Of Birth          1965        Your diagnoses for this visit were:     Inflammation of lung     Pleuritic chest pain        You were seen by Chino Ramirez MD.      Follow-up Information     Please follow up.    Why:  take the doxycycline for possible pneumonia - follow up with your MD to recheck - if not improving you may require repeat chest CT scan in the near future        Discharge Instructions         Possible Pneumonia (Adult)  Pneumonia is an infection deep within the lungs. It is in the small air sacs (alveoli). Pneumonia may be caused by a virus or bacteria. Pneumonia caused by bacteria is usually treated with an antibiotic. Severe cases may need to be treated in the hospital. Milder cases can be treated at home. Symptoms usually start to get better during the first 2 days of treatment.    Home care  Follow these guidelines when caring for yourself at home:    Rest at home for the first 2 to 3 days, or until you feel stronger. Don t let yourself get overly tired when you go back to your activities.    Stay away from cigarette smoke - yours or other people s.    You may use acetaminophen or ibuprofen to control fever or pain, unless another medicine was prescribed. If you have chronic liver or kidney disease, talk with your healthcare provider before using these medicines. Also talk with your provider if you ve had a stomach ulcer or gastrointestinal bleeding. Don t give aspirin to anyone younger than 18 years of age who is ill with a fever. It may cause severe liver damage.    Your appetite may be poor, so a light diet is fine.    Drink 6 to 8 glasses of fluids every day to make sure you are getting  enough fluids. Beverages can include water, sport drinks, sodas without caffeine, juices, tea, or soup. Fluids will help loosen secretions in the lung. This will make it easier for you to cough up the phlegm (sputum). If you also have heart or kidney disease, check with your healthcare provider before you drink extra fluids.    Take antibiotic medicine prescribed until it is all gone, even if you are feeling better after a few days.  Follow-up care  Follow up with your healthcare provider in the next 2 to 3 days, or as advised. This is to be sure the medicine is helping you get better.  If you are 65 or older, you should get a pneumococcal vaccine and a yearly flu (influenza) shot. You should also get these vaccines if you have chronic lung disease like asthma, emphysema, or COPD. Recently, a second type of pneumonia vaccine has become available for everyone over 65 years old. This is in addition to the previous vaccine. Ask your provider about this.  When to seek medical advice  Call your healthcare provider right away if any of these occur:    You don t get better within the first 48 hours of treatment    Shortness of breath gets worse    Rapid breathing (more than 25 breaths per minute)    Coughing up blood    Chest pain gets worse with breathing    Fever of 100.4 F (38 C) or higher that doesn t get better with fever medicine    Weakness, dizziness, or fainting that gets worse    Thirst or dry mouth that gets worse    Sinus pain, headache, or a stiff neck    Chest pain not caused by coughing  Date Last Reviewed: 1/1/2017 2000-2017 [employer]. [address] [place] [address], [place] [address]. All rights reserved. This information is not intended as a substitute for professional medical care. Always follow your healthcare professional's instructions.          24 Hour Appointment Hotline       To make an appointment at any [facility], call [phone] ([phone]). If you don't have a family  doctor or clinic, we will help you find one. [facility] are conveniently located to serve the needs of you and your family.             Review of your medicines      START taking        Dose / Directions Last dose taken    doxycycline 100 MG capsule   Commonly known as:  VIBRAMYCIN   Dose:  100 mg   Quantity:  20 capsule        Take 1 capsule (100 mg) by mouth 2 times daily for 10 days   Refills:  0          CONTINUE these medicines which may have CHANGED, or have new prescriptions. If we are uncertain of the size of tablets/capsules you have at home, strength may be listed as something that might have changed.        Dose / Directions Last dose taken    * HYDROcodone-acetaminophen 5-325 MG per tablet   Commonly known as:  NORCO   Dose:  1 tablet   What changed:  Another medication with the same name was added. Make sure you understand how and when to take each.        Take 1 tablet by mouth every 6 hours as needed for severe pain   Refills:  0        * HYDROcodone-acetaminophen 5-325 MG per tablet   Commonly known as:  NORCO   Dose:  1-2 tablet   What changed:  You were already taking a medication with the same name, and this prescription was added. Make sure you understand how and when to take each.   Quantity:  18 tablet        Take 1-2 tablets by mouth every 4 hours as needed for pain   Refills:  0        * Notice:  This list has 2 medication(s) that are the same as other medications prescribed for you. Read the directions carefully, and ask your doctor or other care provider to review them with you.      Our records show that you are taking the medicines listed below. If these are incorrect, please call your family doctor or clinic.        Dose / Directions Last dose taken    buPROPion 150 MG 12 hr tablet   Commonly known as:  ZYBAN   Dose:  150 mg   Quantity:  60 tablet        Take 1 tablet (150 mg) by mouth 2 times daily   Refills:  3        clopidogrel 75 MG tablet   Commonly known as:  PLAVIX   Dose:   75 mg   Quantity:  90 tablet        Take 1 tablet (75 mg) by mouth daily   Refills:  3        lisinopril-hydrochlorothiazide 10-12.5 MG per tablet   Commonly known as:  PRINZIDE/ZESTORETIC        Refills:  0        nicotine 21 MG/24HR 24 hr patch   Commonly known as:  NICODERM CQ   Dose:  1 patch   Quantity:  30 patch        Place 1 patch onto the skin daily   Refills:  1        rosuvastatin 40 MG tablet   Commonly known as:  CRESTOR   Dose:  40 mg   Quantity:  30 tablet        Take 1 tablet (40 mg) by mouth daily   Refills:  11                Information about OPIOIDS     PRESCRIPTION OPIOIDS: WHAT YOU NEED TO KNOW   We gave you an opioid (narcotic) pain medicine. It is important to manage your pain, but opioids are not always the best choice. You should first try all the other options your care team gave you. Take this medicine for as short a time (and as few doses) as possible.    Some activities can increase your pain, such as bandage changes or therapy sessions. It may help to take your pain medicine 30 to 60 minutes before these activities. Reduce your stress by getting enough sleep, working on hobbies you enjoy and practicing relaxation or meditation. Talk to your care team about ways to manage your pain beyond prescription opioids.    These medicines have risks:    DO NOT drive when on new or higher doses of pain medicine. These medicines can affect your alertness and reaction times, and you could be arrested for driving under the influence (DUI). If you need to use opioids long-term, talk to your care team about driving.    DO NOT operate heavy machinery    DO NOT do any other dangerous activities while taking these medicines.    DO NOT drink any alcohol while taking these medicines.     If the opioid prescribed includes acetaminophen, DO NOT take with any other medicines that contain acetaminophen. Read all labels carefully. Look for the word  acetaminophen  or  Tylenol.  Ask your pharmacist if you have  questions or are unsure.    You can get addicted to pain medicines, especially if you have a history of addiction (chemical, alcohol or substance dependence). Talk to your care team about ways to reduce this risk.    All opioids tend to cause constipation. Drink plenty of water and eat foods that have a lot of fiber, such as fruits, vegetables, prune juice, apple juice and high-fiber cereal. Take a laxative (Miralax, milk of magnesia, Colace, Senna) if you don t move your bowels at least every other day. Other side effects include upset stomach, sleepiness, dizziness, throwing up, tolerance (needing more of the medicine to have the same effect), physical dependence and slowed breathing.    Store your pills in a secure place, locked if possible. We will not replace any lost or stolen medicine. If you don t finish your medicine, please throw away (dispose) as directed by your pharmacist. The [place] [employer] has more information about safe disposal: https://www.pca.[facility].[place].us/living-green/managing-unwanted-medications        Prescriptions were sent or printed at these locations (2 Prescriptions)                   Other Prescriptions                Printed at Department/Unit printer (2 of 2)         doxycycline (VIBRAMYCIN) 100 MG capsule               HYDROcodone-acetaminophen (NORCO) 5-325 MG per tablet                Procedures and tests performed during your visit     Basic metabolic panel    CBC with platelets differential    Chest CT, IV contrast only - PE protocol    Chest XR,  PA & LAT    D dimer quantitative    EKG 12 lead    Peripheral IV catheter    Troponin I      Orders Needing Specimen Collection     None      Pending Results     Date and Time Order Name Status Description    8/10/2018 1919 Chest CT, IV contrast only - PE protocol Preliminary             Pending Culture Results     No orders found from 8/8/2018 to 8/11/2018.            Pending Results Instructions     If you had any lab  results that were not finalized at the time of your Discharge, you can call the ED Lab Result RN at [phone]. You will be contacted by this team for any positive Lab results or changes in treatment. The nurses are available 7 days a week from 10A to 6:30P.  You can leave a message 24 hours per day and they will return your call.        Test Results From Your Hospital Stay        8/10/2018  6:29 PM      Narrative     XR CHEST TWO VIEWS   8/10/2018 6:17 PM     HISTORY: Left-sided pleuritic chest pain.    COMPARISON: None.     FINDINGS: Cardiomediastinal silhouette within normal limits. No focal  airspace opacities. No pleural effusion. No pneumothorax identified.  The bones are unremarkable.         Impression     IMPRESSION: No acute cardiopulmonary abnormalities.    DUSTIN VALENCIA MD         8/10/2018  6:06 PM      Component Results     Component Value Ref Range & Units Status    WBC 7.9 4.0 - 11.0 10e9/L Final    RBC Count 3.88 (L) 4.4 - 5.9 10e12/L Final    Hemoglobin 13.3 13.3 - 17.7 g/dL Final    Hematocrit 37.1 (L) 40.0 - 53.0 % Final    MCV 96 78 - 100 fl Final    MCH 34.3 (H) 26.5 - 33.0 pg Final    MCHC 35.8 31.5 - 36.5 g/dL Final    RDW 12.7 10.0 - 15.0 % Final    Platelet Count 208 150 - 450 10e9/L Final    Diff Method Automated Method  Final    % Neutrophils 54.1 % Final    % Lymphocytes 32.3 % Final    % Monocytes 10.8 % Final    % Eosinophils 2.4 % Final    % Basophils 0.3 % Final    % Immature Granulocytes 0.1 % Final    Nucleated RBCs 0 0 /100 Final    Absolute Neutrophil 4.3 1.6 - 8.3 10e9/L Final    Absolute Lymphocytes 2.6 0.8 - 5.3 10e9/L Final    Absolute Monocytes 0.9 0.0 - 1.3 10e9/L Final    Absolute Eosinophils 0.2 0.0 - 0.7 10e9/L Final    Absolute Basophils 0.0 0.0 - 0.2 10e9/L Final    Abs Immature Granulocytes 0.0 0 - 0.4 10e9/L Final    Absolute Nucleated RBC 0.0  Final         8/10/2018  6:19 PM      Component Results     Component Value Ref Range & Units Status    Sodium 136 133 -  144 mmol/L Final    Potassium 3.9 3.4 - 5.3 mmol/L Final    Chloride 101 94 - 109 mmol/L Final    Carbon Dioxide 25 20 - 32 mmol/L Final    Anion Gap 10 3 - 14 mmol/L Final    Glucose 88 70 - 99 mg/dL Final    Urea Nitrogen 15 7 - 30 mg/dL Final    Creatinine 0.86 0.66 - 1.25 mg/dL Final    GFR Estimate >90 >60 mL/min/1.7m2 Final    Non  GFR Calc    GFR Estimate If Black >90 >60 mL/min/1.7m2 Final    African American GFR Calc    Calcium 8.9 8.5 - 10.1 mg/dL Final         8/10/2018  6:22 PM      Component Results     Component Value Ref Range & Units Status    Troponin I ES 0.023 0.000 - 0.045 ug/L Final    The 99th percentile for upper reference range is 0.045 ug/L.  Troponin values   in the range of 0.045 - 0.120 ug/L may be associated with risks of adverse   clinical events.           8/10/2018  7:17 PM      Component Results     Component Value Ref Range & Units Status    D Dimer 1.0 (H) 0.0 - 0.50 ug/ml FEU Final    This D-dimer assay is intended for use in conjunction with a clinical pretest   probability assessment model to exclude pulmonary embolism (PE) and deep   venous thrombosis (DVT) in outpatients suspected of PE or DVT. The cut-off   value is 0.5 ug/mL FEU.           8/10/2018  7:54 PM      Narrative     CT CHEST PULMONARY EMBOLISM WITH CONTRAST  8/10/2018 7:43 PM    HISTORY: Left-sided pleuritic chest pain, elevated D-dimer.    TECHNIQUE: Scans obtained from the apices through the diaphragm with  IV contrast. 61 mL Isovue-370 injected. Radiation dose for this scan  was reduced using automated exposure control, adjustment of the mA  and/or kV according to patient size, or iterative reconstruction  technique.    COMPARISON: None.    FINDINGS:  Vascular: No acute thoracic aortic abnormality. Thoracic aortic and  coronary artery calcifications noted. No evidence for pulmonary  embolism.    Lungs: Mild bilateral peribronchial wall thickening. Nodular opacity  within a central right upper  lobe airway is 0.6 cm series 6 image 64.  There are areas of mucus impaction also noted at the left lung base.  Small focal nodular area of consolidation at the lingula is 1.3 cm  series 7 image 236.    Lymph nodes: No enlarged lymph nodes.    Additional findings: Upper abdomen shows no acute abnormalities.  Vascular calcifications.        Impression     IMPRESSION:   1. No pulmonary embolism or acute thoracic aortic abnormality.  Coronary artery calcifications noted.  2. Small focal area of consolidation at the lingula. This could  represent a small area of airspace disease such as pneumonia. As it is  nodular it is technically indeterminate. Recommend follow-up CT chest  in three months to ensure resolution.  3. Peribronchial wall thickening and areas of presumed mucus  impaction. One focal area of small bronchial filling defect is at the  right upper lobe. Although this could represent mucus impaction, other  etiologies not entirely excluded based on imaging alone, including  neoplastic cause.                Clinical Quality Measure: Blood Pressure Screening     Your blood pressure was checked while you were in the emergency department today. The last reading we obtained was  BP: 157/77 . Please read the guidelines below about what these numbers mean and what you should do about them.  If your systolic blood pressure (the top number) is less than 120 and your diastolic blood pressure (the bottom number) is less than 80, then your blood pressure is normal. There is nothing more that you need to do about it.  If your systolic blood pressure (the top number) is 120-139 or your diastolic blood pressure (the bottom number) is 80-89, your blood pressure may be higher than it should be. You should have your blood pressure rechecked within a year by a primary care provider.  If your systolic blood pressure (the top number) is 140 or greater or your diastolic blood pressure (the bottom number) is 90 or greater, you may  have high blood pressure. High blood pressure is treatable, but if left untreated over time it can put you at risk for heart attack, stroke, or kidney failure. You should have your blood pressure rechecked by a primary care provider within the next 4 weeks.  If your provider in the emergency department today gave you specific instructions to follow-up with your doctor or provider even sooner than that, you should follow that instruction and not wait for up to 4 weeks for your follow-up visit.        Thank you for choosing [facility]       Thank you for choosing [facility] for your care. Our goal is always to provide you with excellent care. Hearing back from our patients is one way we can continue to improve our services. Please take a few minutes to complete the written survey that you may receive in the mail after you visit with us. Thank you!        Care EveryWhere ID     This is your Care EveryWhere ID. This could be used by other organizations to access your [facility] medical records  [id number]        Equal Access to Services     FILEMON EVANS : Jessica Cerrato, tab johns, angel cruz, abundio traylor . So [facility] [phone].    ATENCIÓN: Si habla español, tiene a grant disposición servicios gratuitos de asistencia lingüística. Llame al [phone].    We comply with applicable federal civil rights laws and [place] laws. We do not discriminate on the basis of race, color, national origin, age, disability, sex, sexual orientation, or gender identity.            After Visit Summary       This is your record. Keep this with you and show to your community pharmacist(s) and doctor(s) at your next visit.

## 2018-08-11 NOTE — DISCHARGE INSTRUCTIONS
Possible Pneumonia (Adult)  Pneumonia is an infection deep within the lungs. It is in the small air sacs (alveoli). Pneumonia may be caused by a virus or bacteria. Pneumonia caused by bacteria is usually treated with an antibiotic. Severe cases may need to be treated in the hospital. Milder cases can be treated at home. Symptoms usually start to get better during the first 2 days of treatment.    Home care  Follow these guidelines when caring for yourself at home:    Rest at home for the first 2 to 3 days, or until you feel stronger. Don t let yourself get overly tired when you go back to your activities.    Stay away from cigarette smoke - yours or other people s.    You may use acetaminophen or ibuprofen to control fever or pain, unless another medicine was prescribed. If you have chronic liver or kidney disease, talk with your healthcare provider before using these medicines. Also talk with your provider if you ve had a stomach ulcer or gastrointestinal bleeding. Don t give aspirin to anyone younger than 18 years of age who is ill with a fever. It may cause severe liver damage.    Your appetite may be poor, so a light diet is fine.    Drink 6 to 8 glasses of fluids every day to make sure you are getting enough fluids. Beverages can include water, sport drinks, sodas without caffeine, juices, tea, or soup. Fluids will help loosen secretions in the lung. This will make it easier for you to cough up the phlegm (sputum). If you also have heart or kidney disease, check with your healthcare provider before you drink extra fluids.    Take antibiotic medicine prescribed until it is all gone, even if you are feeling better after a few days.  Follow-up care  Follow up with your healthcare provider in the next 2 to 3 days, or as advised. This is to be sure the medicine is helping you get better.  If you are 65 or older, you should get a pneumococcal vaccine and a yearly flu (influenza) shot. You should also get these  vaccines if you have chronic lung disease like asthma, emphysema, or COPD. Recently, a second type of pneumonia vaccine has become available for everyone over 65 years old. This is in addition to the previous vaccine. Ask your provider about this.  When to seek medical advice  Call your healthcare provider right away if any of these occur:    You don t get better within the first 48 hours of treatment    Shortness of breath gets worse    Rapid breathing (more than 25 breaths per minute)    Coughing up blood    Chest pain gets worse with breathing    Fever of 100.4 F (38 C) or higher that doesn t get better with fever medicine    Weakness, dizziness, or fainting that gets worse    Thirst or dry mouth that gets worse    Sinus pain, headache, or a stiff neck    Chest pain not caused by coughing  Date Last Reviewed: 1/1/2017 2000-2017 [employer]. [address] [place] [address], [place] [address]. All rights reserved. This information is not intended as a substitute for professional medical care. Always follow your healthcare professional's instructions.

## 2018-11-07 NOTE — PROGRESS NOTES
[facility]    Grey Candelario returns for vascular follow-up.This 53-year-old smoker has a history of bilateral leg claudication.  He had undergone angiography with bilateral external iliac artery angioplasty and stenting on 3/23/2018.  Very mild disease was noted in both common femoral and mid superficial femoral arteries with excellent three-vessel runoff.  He did very well following the procedure with significant improvement of his claudication symptoms.  His job does require the walks a great deal.    He been working on decreasing his smoking and was down to 1/2 pack cigarettes daily this past summer.  We had some concerns about his iliac stents due to a decrease in his ADRIAN with exercise and thus performed a duplex in July revealing no in-stent stenosis.  At that time he had excellent palpable dorsalis pedis and posterior tibial pulses bilaterally.      He continues to have bilateral calf fatigue and achiness with shorter distance ambulation.  No buttock or thigh claudication.  This continues to be problematic.  He also noticed that night cramps in his calf and foot which are more muscle spasms.  He is already taking calcium and magnesium supplements.    PMH: Medications: Crestor, lisinopril/HCTZ, Plavix (recently ran out), NicoDerm    Still smoking approximately 1/2 pack cigarettes daily.    Exam: Alert and appropriate.  Normal affect.              Blood pressure 149/76 right arm.  Pulse 94              Chest= clear.              Cardiovascular= regular rate.              Extremities= no edema, normal sensation, +3 dorsalis pedis and                      posterior tibial pulses bilaterally.    ADRIAN with exercise was performed today.  This reveals triphasic/biphasic signals in all pedal arteries.  Resting ADRIAN is 0.78 on the right and 0.76 on the left.  This decreases significantly on the right to 0.45 in the left 0.32 with exercise.  He had fatigue in both legs at 2 minutes  and calf fatigue  and pain at 3 minutes.  He was able to complete the 5 stress test.      I reviewed the prior angiographic films with the patient.  External iliac disease as mentioned that was successfully stented.  Very mild infrainguinal disease involving the SFA.  Origin of the left anterior tibial artery disease of stenotic but otherwise widely patent as are the posterior tibial and peroneal arteries.      Impression: #1.  Ongoing leg discomfort calf pain with exercise.  Abnormal resting ADRIAN the decrease with exercise which deftly implies this is a vascular etiology.  As mentioned we found no duplex evidence of external iliac artery stent restenosis which would be the most likely etiology this past summer.  ABIs at rest and with exercise are definitely decreased implying there is indeed a vascular problem.  He was ongoing symptoms we will repeat the aortogram with runoffs to help clarify the situation and hopefully improve his symptoms.  We will keep him off the Plavix since he ran out last week until after the angiogram.  Again stressed the importance of smoking cessation.                             #2.   Night muscle cramping.  This is not vascular etiology.  Already taking calcium magnesium supplements.  Would recommend drinking quinine water at night to see if this helps.    We spent 20 minutes in the office today with over 50% counseling.  I will see him during his hospitalization for his angiogram.      Giorgio Castillo MD     Please route or send letter to:  Primary Care Provider (PCP) Dr Ortega

## 2018-11-08 ENCOUNTER — HOSPITAL ENCOUNTER (OUTPATIENT)
Dept: ULTRASOUND IMAGING | Facility: CLINIC | Age: 53
Discharge: HOME | End: 2018-11-08
Attending: SURGERY | Admitting: SURGERY
Payer: COMMERCIAL

## 2018-11-08 ENCOUNTER — OFFICE VISIT (OUTPATIENT)
Dept: OTHER | Facility: CLINIC | Age: 53
End: 2018-11-08
Attending: SURGERY
Payer: COMMERCIAL

## 2018-11-08 VITALS — SYSTOLIC BLOOD PRESSURE: 149 MMHG | HEART RATE: 94 BPM | DIASTOLIC BLOOD PRESSURE: 76 MMHG

## 2018-11-08 DIAGNOSIS — I73.9 PAD (PERIPHERAL ARTERY DISEASE) (H): ICD-10-CM

## 2018-11-08 DIAGNOSIS — I73.9 PAD (PERIPHERAL ARTERY DISEASE) (H): Primary | ICD-10-CM

## 2018-11-08 PROCEDURE — 99214 OFFICE O/P EST MOD 30 MIN: CPT | Mod: ZP | Performed by: SURGERY

## 2018-11-08 PROCEDURE — 93924 LWR XTR VASC STDY BILAT: CPT

## 2018-11-08 PROCEDURE — G0463 HOSPITAL OUTPT CLINIC VISIT: HCPCS

## 2018-11-08 NOTE — MR AVS SNAPSHOT
"              After Visit Summary   2018    Grey Candelario    MRN: [id number]           Patient Information     Date Of Birth          1965        Visit Information        Provider Department      2018 3:30 PM Giorgio Castillo MD [facility]      Today's Diagnoses     PAD (peripheral artery disease) (H)    -  1      Care Instructions    Patient to follow up with Primary Care provider regarding elevated blood pressure.            Follow-ups after your visit        Who to contact     If you have questions or need follow up information about today's clinic visit or your schedule please contact [facility] directly at [phone].  Normal or non-critical lab and imaging results will be communicated to you by MyChart, letter or phone within 4 business days after the clinic has received the results. If you do not hear from us within 7 days, please contact the clinic through MyChart or phone. If you have a critical or abnormal lab result, we will notify you by phone as soon as possible.  Submit refill requests through [facility] or call your pharmacy and they will forward the refill request to us. Please allow 3 business days for your refill to be completed.          Additional Information About Your Visit        MyChart Information     [facility] lets you send messages to your doctor, view your test results, renew your prescriptions, schedule appointments and more. To sign up, go to [email or web address] . Click on \"Log in\" on the left side of the screen, which will take you to the Welcome page. Then click on \"Sign up Now\" on the right side of the page.     You will be asked to enter the access code listed below, as well as some personal information. Please follow the directions to create your username and password.     Your access code is: [email or web address]  Expires: 2019  5:39 PM     Your access code will  " in 90 days. If you need help or a new code, please call your [place] clinic or [phone].        Care EveryWhere ID     This is your Care EveryWhere ID. This could be used by other organizations to access your [place] medical records  [id number]        Your Vitals Were     Pulse                   94            Blood Pressure from Last 3 Encounters:   11/08/18 149/76   08/10/18 161/86   07/19/18 146/74    Weight from Last 3 Encounters:   08/10/18 155 lb (70.3 kg)   03/23/18 155 lb (70.3 kg)   03/08/18 155 lb (70.3 kg)              Today, you had the following     No orders found for display       Primary Care Provider Office Phone # Fax #    Santosh Cresencio Ortega [phone]       [place] [facility] [address]    [facility] [address]        Equal Access to Services     [facility] : Hadii aad ku hadasho Soomaali, waaxda luqadaha, qaybta kaalmada adeegyada, waxay idiin haysolon nicole traylor . So [facility] [phone].    ATENCIÓN: Si habla español, tiene a grant disposición servicios gratuitos de asistencia lingüística. Terence al [phone].    We comply with applicable federal civil rights laws and [place] laws. We do not discriminate on the basis of race, color, national origin, age, disability, sex, sexual orientation, or gender identity.            Thank you!     Thank you for choosing [facility] [place]  for your care. Our goal is always to provide you with excellent care. Hearing back from our patients is one way we can continue to improve our services. Please take a few minutes to complete the written survey that you may receive in the mail after your visit with us. Thank you!             Your Updated Medication List - Protect others around you: Learn how to safely use, store and throw away your medicines at www.disposemymeds.org.          This list is accurate as of 11/8/18  5:39 PM.  Always use your most recent med list.                    Brand Name Dispense Instructions for use Diagnosis    buPROPion 150 MG 12 hr tablet    ZYBAN    60 tablet    Take 1 tablet (150 mg) by mouth 2 times daily    Cigarette nicotine dependence with other nicotine-induced disorder       clopidogrel 75 MG tablet    PLAVIX    90 tablet    Take 1 tablet (75 mg) by mouth daily    Atheroscler of native artery of both legs with intermit claudication (H)       * HYDROcodone-acetaminophen 5-325 MG per tablet    NORCO     Take 1 tablet by mouth every 6 hours as needed for severe pain        * HYDROcodone-acetaminophen 5-325 MG per tablet    NORCO    18 tablet    Take 1-2 tablets by mouth every 4 hours as needed for pain        lisinopril-hydrochlorothiazide 10-12.5 MG per tablet    PRINZIDE/ZESTORETIC          nicotine 21 MG/24HR 24 hr patch    NICODERM CQ    30 patch    Place 1 patch onto the skin daily    Cigarette nicotine dependence with other nicotine-induced disorder       rosuvastatin 40 MG tablet    CRESTOR    30 tablet    Take 1 tablet (40 mg) by mouth daily    Hyperlipidemia LDL goal <70       * Notice:  This list has 2 medication(s) that are the same as other medications prescribed for you. Read the directions carefully, and ask your doctor or other care provider to review them with you.

## 2018-11-08 NOTE — LETTER
[facility]  [address]  [place] [address]  Phone: [phone]  Fax: [phone]      2018    RE: Grey Candelario, : 1965      [facility]     Grey Candelario returns for vascular follow-up.This 53-year-old smoker has a history of bilateral leg claudication.  He had undergone angiography with bilateral external iliac artery angioplasty and stenting on 3/23/2018.  Very mild disease was noted in both common femoral and mid superficial femoral arteries with excellent three-vessel runoff.  He did very well following the procedure with significant improvement of his claudication symptoms.  His job does require the walks a great deal.     He been working on decreasing his smoking and was down to 1/2 pack cigarettes daily this past summer.  We had some concerns about his iliac stents due to a decrease in his ADRIAN with exercise and thus performed a duplex in July revealing no in-stent stenosis.  At that time he had excellent palpable dorsalis pedis and posterior tibial pulses bilaterally.      He continues to have bilateral calf fatigue and achiness with shorter distance ambulation.  No buttock or thigh claudication.  This continues to be problematic.  He also noticed that night cramps in his calf and foot which are more muscle spasms.  He is already taking calcium and magnesium supplements.     PMH: Medications: Crestor, lisinopril/HCTZ, Plavix (recently ran out), NicoDerm     Still smoking approximately 1/2 pack cigarettes daily.     Exam: Alert and appropriate.  Normal affect.              Blood pressure 149/76 right arm.  Pulse 94              Chest= clear.              Cardiovascular= regular rate.              Extremities= no edema, normal sensation, +3 dorsalis pedis and                      posterior tibial pulses bilaterally.     ADRIAN with exercise was performed today.  This reveals triphasic/biphasic signals in all pedal  arteries.  Resting ADRIAN is 0.78 on the right and 0.76 on the left.  This decreases significantly on the right to 0.45 in the left 0.32 with exercise.  He had fatigue in both legs at 2 minutes  and calf fatigue and pain at 3 minutes.  He was able to complete the 5 stress test.     I reviewed the prior angiographic films with the patient.  External iliac disease as mentioned that was successfully stented.  Very mild infrainguinal disease involving the SFA.  Origin of the left anterior tibial artery disease of stenotic but otherwise widely patent as are the posterior tibial and peroneal arteries.           Impression: #1.  Ongoing leg discomfort calf pain with exercise.  Abnormal resting ADRIAN the decrease with exercise which deftly implies this is a vascular etiology.  As mentioned we found no duplex evidence of external iliac artery stent restenosis which would be the most likely etiology this past summer.  ABIs at rest and with exercise are definitely decreased implying there is indeed a vascular problem.  He was ongoing symptoms we will repeat the aortogram with runoffs to help clarify the situation and hopefully improve his symptoms.  We will keep him off the Plavix since he ran out last week until after the angiogram.  Again stressed the importance of smoking cessation.                             #2.   Night muscle cramping.  This is not vascular etiology.  Already taking calcium magnesium supplements.  Would recommend drinking quinine water at night to see if this helps.     I will see him during his hospitalization for his angiogram.     Sincerely,     Giorgio Castillo MD        [facility]  [address] [place] [address]  Phone: [phone]  Fax: [phone]

## 2018-11-08 NOTE — NURSING NOTE
"Grey Candelario is a 53 year old male who presents for:  Chief Complaint   Patient presents with     RECHECK     ADRIAN(2:30pmVHC, 3:30pmWRO) History of bilateral external iliac stenting on 3/23/18.        Vitals:    Vitals:    11/08/18 1508   BP: 149/76   BP Location: Right arm   Patient Position: Sitting   Cuff Size: Adult Regular   Pulse: 94       BMI:  Estimated body mass index is 22.24 kg/(m^2) as calculated from the following:    Height as of 8/10/18: 5' 10\" (1.778 m).    Weight as of 8/10/18: 155 lb (70.3 kg).    Pain Score:  Data Unavailable        Trina Neal"

## 2018-11-08 NOTE — NURSING NOTE
Patient Education    Procedure: Abdominal aortogram, bilateral lower extremity angiogram with possible intervention  Diagnosis: PAD  Anticoagulation Instruction: continue to hold Plavix until after procedure  Pre-Operative Physical Exam: You need to have a pre-op physical exam within 30 days of your procedure. Your procedure may be cancelled if you do not have a current History and Physical. Call your PCP's office to schedule.  Allergies:  Updated in Epic  Bowel Prep: n/a  Post Procedure Education: [facility] patient post-procedure fact sheet reviewed with patient.    Learner(s):patient  Method: Listening, Reading  Barriers to Learning:No Barrier  Outcome: Patient did verbalize understanding of above education.    Belinda Mcelroy, DEANDREN, RN

## 2018-11-09 ENCOUNTER — TELEPHONE (OUTPATIENT)
Dept: OTHER | Facility: CLINIC | Age: 53
End: 2018-11-09

## 2018-11-09 NOTE — TELEPHONE ENCOUNTER
Type of surgery: ABDOMINAL AORTOGRAM, BILATERAL LOWER EXTREMITY ANGIOGRAM, POSSIBLE INTERVENTION  Location of surgery: [facility] OR  Date and time of surgery: 11/16/18 AT 12 PM  Surgeon: DR MARIPOSA CRUZ  Pre-Op Appt Date: UNKNOWN  Post-Op Appt Date: UNKNOWN   Packet sent out: GIVEN TO PT 11/16/18  Pre-cert/Authorization completed:  SENT FOR PA SUBMISSION  Date: [id number] Twila Nazario -  at [facility]

## 2018-11-16 ENCOUNTER — APPOINTMENT (OUTPATIENT)
Dept: INTERVENTIONAL RADIOLOGY/VASCULAR | Facility: CLINIC | Age: 53
End: 2018-11-16
Attending: SURGERY
Payer: COMMERCIAL

## 2018-11-16 ENCOUNTER — HOSPITAL ENCOUNTER (OUTPATIENT)
Facility: CLINIC | Age: 53
Discharge: HOME | End: 2018-11-16
Attending: RADIOLOGY | Admitting: RADIOLOGY
Payer: COMMERCIAL

## 2018-11-16 VITALS
HEIGHT: 70 IN | SYSTOLIC BLOOD PRESSURE: 154 MMHG | DIASTOLIC BLOOD PRESSURE: 65 MMHG | WEIGHT: 159.5 LBS | TEMPERATURE: 97.4 F | OXYGEN SATURATION: 98 % | RESPIRATION RATE: 18 BRPM | HEART RATE: 84 BPM | BODY MASS INDEX: 22.83 KG/M2

## 2018-11-16 DIAGNOSIS — I70.213 ATHEROSCLEROSIS OF NATIVE ARTERY OF BOTH LOWER EXTREMITIES WITH INTERMITTENT CLAUDICATION (H): Primary | ICD-10-CM

## 2018-11-16 DIAGNOSIS — I70.213 ATHEROSCLEROSIS OF NATIVE ARTERIES OF EXTREMITIES WITH INTERMITTENT CLAUDICATION, BILATERAL LEGS (H): ICD-10-CM

## 2018-11-16 DIAGNOSIS — E78.5 HYPERLIPIDEMIA LDL GOAL <70: ICD-10-CM

## 2018-11-16 LAB
APTT PPP: 31 SEC (ref 22–37)
CHOLEST SERPL-MCNC: 255 MG/DL
CREAT SERPL-MCNC: 0.78 MG/DL (ref 0.66–1.25)
ERYTHROCYTE [DISTWIDTH] IN BLOOD BY AUTOMATED COUNT: 12.7 % (ref 10–15)
GFR SERPL CREATININE-BSD FRML MDRD: >90 ML/MIN/1.7M2
HBA1C MFR BLD: 4.9 % (ref 0–5.6)
HCT VFR BLD AUTO: 37.9 % (ref 40–53)
HDLC SERPL-MCNC: 96 MG/DL
HGB BLD-MCNC: 13.4 G/DL (ref 13.3–17.7)
INR PPP: 0.85 (ref 0.86–1.14)
LDLC SERPL CALC-MCNC: 144 MG/DL
MCH RBC QN AUTO: 35.1 PG (ref 26.5–33)
MCHC RBC AUTO-ENTMCNC: 35.4 G/DL (ref 31.5–36.5)
MCV RBC AUTO: 99 FL (ref 78–100)
NONHDLC SERPL-MCNC: 159 MG/DL
PLATELET # BLD AUTO: 260 10E9/L (ref 150–450)
RBC # BLD AUTO: 3.82 10E12/L (ref 4.4–5.9)
TRIGL SERPL-MCNC: 76 MG/DL
WBC # BLD AUTO: 7.8 10E9/L (ref 4–11)

## 2018-11-16 PROCEDURE — 85610 PROTHROMBIN TIME: CPT | Performed by: RADIOLOGY

## 2018-11-16 PROCEDURE — C1769 GUIDE WIRE: HCPCS

## 2018-11-16 PROCEDURE — 25000132 ZZH RX MED GY IP 250 OP 250 PS 637: Performed by: SURGERY

## 2018-11-16 PROCEDURE — 27210805 ZZH SHEATH CR4

## 2018-11-16 PROCEDURE — 25000128 H RX IP 250 OP 636: Performed by: RADIOLOGY

## 2018-11-16 PROCEDURE — 82565 ASSAY OF CREATININE: CPT | Performed by: RADIOLOGY

## 2018-11-16 PROCEDURE — 85027 COMPLETE CBC AUTOMATED: CPT | Performed by: RADIOLOGY

## 2018-11-16 PROCEDURE — 27210742 ZZH CATH CR1

## 2018-11-16 PROCEDURE — 25000128 H RX IP 250 OP 636

## 2018-11-16 PROCEDURE — 83036 HEMOGLOBIN GLYCOSYLATED A1C: CPT | Performed by: RADIOLOGY

## 2018-11-16 PROCEDURE — 99213 OFFICE O/P EST LOW 20 MIN: CPT | Performed by: SURGERY

## 2018-11-16 PROCEDURE — 25000125 ZZHC RX 250

## 2018-11-16 PROCEDURE — 27210906 ZZH KIT CR8

## 2018-11-16 PROCEDURE — 40000235 ZZH STATISTIC TELEMETRY

## 2018-11-16 PROCEDURE — 40000936 ZZH STATISTIC OUTPATIENT (NON-OBS) NIGHT

## 2018-11-16 PROCEDURE — 75774 ARTERY X-RAY EACH VESSEL: CPT | Mod: XU

## 2018-11-16 PROCEDURE — 99204 OFFICE O/P NEW MOD 45 MIN: CPT | Performed by: INTERNAL MEDICINE

## 2018-11-16 PROCEDURE — 85730 THROMBOPLASTIN TIME PARTIAL: CPT | Performed by: RADIOLOGY

## 2018-11-16 PROCEDURE — 27210845 ZZH DEVICE INFLATION CR5

## 2018-11-16 PROCEDURE — 40000935 ZZH STATISTIC OUTPATIENT (NON-OBS) EVE

## 2018-11-16 PROCEDURE — 80061 LIPID PANEL: CPT | Performed by: RADIOLOGY

## 2018-11-16 PROCEDURE — 40000852 ZZH STATISTIC HEART CATH LAB OR EP LAB

## 2018-11-16 PROCEDURE — 25500064 ZZH RX 255 OP 636: Performed by: RADIOLOGY

## 2018-11-16 PROCEDURE — 36415 COLL VENOUS BLD VENIPUNCTURE: CPT

## 2018-11-16 PROCEDURE — 27210886 ZZH ACCESSORY CR5

## 2018-11-16 PROCEDURE — 27210740 ZZH ACCESSORY CR3

## 2018-11-16 RX ORDER — LIDOCAINE 40 MG/G
CREAM TOPICAL
Status: DISCONTINUED | OUTPATIENT
Start: 2018-11-16 | End: 2018-11-17 | Stop reason: HOSPADM

## 2018-11-16 RX ORDER — SODIUM CHLORIDE 9 MG/ML
INJECTION, SOLUTION INTRAVENOUS CONTINUOUS
Status: DISCONTINUED | OUTPATIENT
Start: 2018-11-16 | End: 2018-11-17 | Stop reason: HOSPADM

## 2018-11-16 RX ORDER — NICOTINE 21 MG/24HR
1 PATCH, TRANSDERMAL 24 HOURS TRANSDERMAL DAILY
Status: DISCONTINUED | OUTPATIENT
Start: 2018-11-17 | End: 2018-11-16

## 2018-11-16 RX ORDER — ROSUVASTATIN CALCIUM 40 MG/1
40 TABLET, COATED ORAL DAILY
Qty: 90 TABLET | Refills: 3 | Status: SHIPPED | OUTPATIENT
Start: 2018-11-16 | End: 2019-10-27

## 2018-11-16 RX ORDER — NICOTINE POLACRILEX 4 MG
15-30 LOZENGE BUCCAL
Status: DISCONTINUED | OUTPATIENT
Start: 2018-11-16 | End: 2018-11-17 | Stop reason: HOSPADM

## 2018-11-16 RX ORDER — NICOTINE 21 MG/24HR
1 PATCH, TRANSDERMAL 24 HOURS TRANSDERMAL DAILY
Status: DISCONTINUED | OUTPATIENT
Start: 2018-11-16 | End: 2018-11-17 | Stop reason: HOSPADM

## 2018-11-16 RX ORDER — LIDOCAINE HYDROCHLORIDE 10 MG/ML
INJECTION, SOLUTION INFILTRATION; PERINEURAL
Status: COMPLETED
Start: 2018-11-16 | End: 2018-11-16

## 2018-11-16 RX ORDER — NALOXONE HYDROCHLORIDE 0.4 MG/ML
.1-.4 INJECTION, SOLUTION INTRAMUSCULAR; INTRAVENOUS; SUBCUTANEOUS
Status: DISCONTINUED | OUTPATIENT
Start: 2018-11-16 | End: 2018-11-17 | Stop reason: HOSPADM

## 2018-11-16 RX ORDER — LIDOCAINE HYDROCHLORIDE 10 MG/ML
1-30 INJECTION, SOLUTION EPIDURAL; INFILTRATION; INTRACAUDAL; PERINEURAL
Status: COMPLETED | OUTPATIENT
Start: 2018-11-16 | End: 2018-11-16

## 2018-11-16 RX ORDER — ACETAMINOPHEN 500 MG
500 TABLET ORAL EVERY 6 HOURS PRN
Status: DISCONTINUED | OUTPATIENT
Start: 2018-11-16 | End: 2018-11-17 | Stop reason: HOSPADM

## 2018-11-16 RX ORDER — FENTANYL CITRATE 50 UG/ML
INJECTION, SOLUTION INTRAMUSCULAR; INTRAVENOUS
Status: DISCONTINUED
Start: 2018-11-16 | End: 2018-11-16 | Stop reason: HOSPADM

## 2018-11-16 RX ORDER — FLUMAZENIL 0.1 MG/ML
0.2 INJECTION, SOLUTION INTRAVENOUS
Status: DISCONTINUED | OUTPATIENT
Start: 2018-11-16 | End: 2018-11-17 | Stop reason: HOSPADM

## 2018-11-16 RX ORDER — FENTANYL CITRATE 50 UG/ML
INJECTION, SOLUTION INTRAMUSCULAR; INTRAVENOUS
Status: COMPLETED
Start: 2018-11-16 | End: 2018-11-16

## 2018-11-16 RX ORDER — FENTANYL CITRATE 50 UG/ML
25-50 INJECTION, SOLUTION INTRAMUSCULAR; INTRAVENOUS EVERY 5 MIN PRN
Status: DISCONTINUED | OUTPATIENT
Start: 2018-11-16 | End: 2018-11-17 | Stop reason: HOSPADM

## 2018-11-16 RX ORDER — IODIXANOL 320 MG/ML
150 INJECTION, SOLUTION INTRAVASCULAR ONCE
Status: COMPLETED | OUTPATIENT
Start: 2018-11-16 | End: 2018-11-16

## 2018-11-16 RX ORDER — DEXTROSE MONOHYDRATE 25 G/50ML
25-50 INJECTION, SOLUTION INTRAVENOUS
Status: DISCONTINUED | OUTPATIENT
Start: 2018-11-16 | End: 2018-11-17 | Stop reason: HOSPADM

## 2018-11-16 RX ADMIN — MIDAZOLAM HYDROCHLORIDE 0.5 MG: 1 INJECTION, SOLUTION INTRAMUSCULAR; INTRAVENOUS at 13:05

## 2018-11-16 RX ADMIN — LIDOCAINE HYDROCHLORIDE 12 ML: 10 INJECTION, SOLUTION INFILTRATION; PERINEURAL at 13:59

## 2018-11-16 RX ADMIN — FENTANYL CITRATE 25 MCG: 50 INJECTION, SOLUTION INTRAMUSCULAR; INTRAVENOUS at 13:05

## 2018-11-16 RX ADMIN — MIDAZOLAM HYDROCHLORIDE 1 MG: 1 INJECTION, SOLUTION INTRAMUSCULAR; INTRAVENOUS at 12:53

## 2018-11-16 RX ADMIN — FENTANYL CITRATE 25 MCG: 50 INJECTION, SOLUTION INTRAMUSCULAR; INTRAVENOUS at 13:16

## 2018-11-16 RX ADMIN — MIDAZOLAM HYDROCHLORIDE 0.5 MG: 1 INJECTION, SOLUTION INTRAMUSCULAR; INTRAVENOUS at 13:46

## 2018-11-16 RX ADMIN — MIDAZOLAM HYDROCHLORIDE 0.5 MG: 1 INJECTION, SOLUTION INTRAMUSCULAR; INTRAVENOUS at 13:16

## 2018-11-16 RX ADMIN — FENTANYL CITRATE 25 MCG: 50 INJECTION, SOLUTION INTRAMUSCULAR; INTRAVENOUS at 13:09

## 2018-11-16 RX ADMIN — MIDAZOLAM HYDROCHLORIDE 0.5 MG: 1 INJECTION, SOLUTION INTRAMUSCULAR; INTRAVENOUS at 13:26

## 2018-11-16 RX ADMIN — HEPARIN SODIUM 10000 UNITS: 10000 INJECTION, SOLUTION INTRAVENOUS; SUBCUTANEOUS at 12:46

## 2018-11-16 RX ADMIN — MIDAZOLAM HYDROCHLORIDE 0.5 MG: 1 INJECTION, SOLUTION INTRAMUSCULAR; INTRAVENOUS at 13:09

## 2018-11-16 RX ADMIN — SODIUM CHLORIDE: 9 INJECTION, SOLUTION INTRAVENOUS at 11:20

## 2018-11-16 RX ADMIN — FENTANYL CITRATE 25 MCG: 50 INJECTION, SOLUTION INTRAMUSCULAR; INTRAVENOUS at 14:03

## 2018-11-16 RX ADMIN — FENTANYL CITRATE 25 MCG: 50 INJECTION, SOLUTION INTRAMUSCULAR; INTRAVENOUS at 13:26

## 2018-11-16 RX ADMIN — HEPARIN SODIUM 10000 UNITS: 10000 INJECTION, SOLUTION INTRAVENOUS; SUBCUTANEOUS at 12:45

## 2018-11-16 RX ADMIN — FENTANYL CITRATE 50 MCG: 50 INJECTION, SOLUTION INTRAMUSCULAR; INTRAVENOUS at 12:53

## 2018-11-16 RX ADMIN — FENTANYL CITRATE 25 MCG: 50 INJECTION, SOLUTION INTRAMUSCULAR; INTRAVENOUS at 13:46

## 2018-11-16 RX ADMIN — MIDAZOLAM HYDROCHLORIDE 0.5 MG: 1 INJECTION, SOLUTION INTRAMUSCULAR; INTRAVENOUS at 14:03

## 2018-11-16 RX ADMIN — IODIXANOL 150 ML: 320 INJECTION, SOLUTION INTRAVASCULAR at 14:20

## 2018-11-16 RX ADMIN — LIDOCAINE HYDROCHLORIDE 12 ML: 10 INJECTION, SOLUTION EPIDURAL; INFILTRATION; INTRACAUDAL; PERINEURAL at 13:59

## 2018-11-16 RX ADMIN — NICOTINE 1 PATCH: 14 PATCH, EXTENDED RELEASE TRANSDERMAL at 21:46

## 2018-11-16 NOTE — PROCEDURES
RADIOLOGY POST PROCEDURE NOTE w/ SEDATION  Patient name: Grey Candelario  MRN: [id number]  : 1965    Pre-procedure diagnosis: bilateral claudication  Post-procedure diagnosis: Same    Procedure Date/Time: 2018  2:52 PM  Procedure:   Pelvic angiogram  Left lower extremity angiogram  Right lower extremity angiogram  Aortic and bilateral common iliac artery pressure measurements  Kissing bilateral common iliac artery origin covered stent placement    Estimated blood loss: None  Specimen(s) collected with description: none    I determined this patient to be an appropriate candidate for the planned sedation and procedure and reassessed the patient IMMEDIATELY PRIOR to sedation and procedure.     The patient tolerated the procedure well with no immediate complications.  Significant findings:none    See imaging dictation for procedural details.    Provider name: Bert Abarca  Assistant(s):None

## 2018-11-16 NOTE — IP AVS SNAPSHOT
MRN:[id number]                      After Visit Summary   11/16/2018    Grey Candelario    MRN: [id number]           Visit Information        Department      11/16/2018 10:29 AM [facility]          Review of your medicines      CONTINUE these medicines which have NOT CHANGED        Dose / Directions    buPROPion 150 MG 12 hr tablet   Commonly known as:  ZYBAN   Used for:  Cigarette nicotine dependence with other nicotine-induced disorder        Dose:  150 mg   Take 1 tablet (150 mg) by mouth 2 times daily   Quantity:  60 tablet   Refills:  3       clopidogrel 75 MG tablet   Commonly known as:  PLAVIX   Used for:  Atheroscler of native artery of both legs with intermit claudication (H)        Dose:  75 mg   Take 1 tablet (75 mg) by mouth daily   Quantity:  90 tablet   Refills:  3       * HYDROcodone-acetaminophen 5-325 MG per tablet   Commonly known as:  NORCO        Dose:  1 tablet   Take 1 tablet by mouth every 6 hours as needed for severe pain   Refills:  0       * HYDROcodone-acetaminophen 5-325 MG per tablet   Commonly known as:  NORCO        Dose:  1-2 tablet   Take 1-2 tablets by mouth every 4 hours as needed for pain   Quantity:  18 tablet   Refills:  0       lisinopril-hydrochlorothiazide 10-12.5 MG per tablet   Commonly known as:  PRINZIDE/ZESTORETIC        Refills:  0       nicotine 21 MG/24HR 24 hr patch   Commonly known as:  NICODERM CQ   Used for:  Cigarette nicotine dependence with other nicotine-induced disorder        Dose:  1 patch   Place 1 patch onto the skin daily   Quantity:  30 patch   Refills:  1       rosuvastatin 40 MG tablet   Commonly known as:  CRESTOR   Used for:  Hyperlipidemia LDL goal <70        Dose:  40 mg   Take 1 tablet (40 mg) by mouth daily   Quantity:  90 tablet   Refills:  3       * Notice:  This list has 2 medication(s) that are the same as other medications prescribed for you. Read the directions carefully, and ask your doctor or other  care provider to review them with you.         Where to get your medicines      These medications were sent to [id number] - [place], [place] - [address]  [address], [place] [address]     Phone:  [phone]     rosuvastatin 40 MG tablet               Prescriptions were sent or printed at these locations (1 Prescription)                   [id number] - [place], [place] - [address]   [address], [place] [address]    Telephone:  [phone]   Fax:  [phone]   Hours:                  E-Prescribed (1 of 1)         rosuvastatin (CRESTOR) 40 MG tablet                 Protect others around you: Learn how to safely use, store and throw away your medicines at www.disposemymeds.org.         Follow-ups after your visit        Additional Services     PAD REHAB REFERRAL       Your provider has referred you to the supervised exercise therapy program.    If you have not heard from the scheduling office within 2 business days, please call [phone] for all locations, with the exception of [place], please call [phone] and [place], please call [phone].    Please be aware that coverage of these services is subject to the terms and limitations of your health insurance plan.  Call member services at your health plan with any benefit or coverage questions.                   Care Instructions        After Care Instructions     Discharge Instructions       Please stop smoking. Utilize nicotine patches to help with this.     Please make sure you take Crestor (Rosuvatstatin) for your cholesterol. You should then have your cholesterol rechecked in 3 months to assure it is at goal. You would benefit from getting this done and being seen by Dr. Carlos of Vascular Medicine at the [facility] (same clinic as Dr. Castillo). Please call [phone] for an appointment.     You would benefit from enrolling in a PAD rehab program - a supervised  walking program - to increase your pain-free walking time. A referral was placed for you.                  Further instructions from your care team       Peripheral Angiogram Discharge Instructions - Femoral     After you go home:      Have an adult stay with you until tomorrow.    Drink extra fluids for 2 days.    You may resume your normal diet.    No smoking       For 24 hours - due to the sedation you received:    Relax and take it easy.    Do NOT make any important or legal decisions.    Do NOT drive or operate machines at home or at work.    Do NOT drink alcohol.    Care of Groin Puncture Site:      For the first 24 hrs - check the puncture site every 1-2 hours while awake.    For 2 days, when you cough, sneeze, laugh or move your bowels, hold your hand over the puncture site and press firmly.    Remove the bandaid after 24 hours. If there is minor oozing, apply another bandaid and remove it after 12 hours.    It is normal to have a small bruise or pea size lump at the site.    You may shower tomorrow.  Do NOT take a bath, or use a hot tub or pool for at least 3 days. Do NOT scrub the site. Do not use lotion or powder near the puncture site.     Activity:            For 2 days:    No stooping or squatting    Do NOT do any heavy activity such as exercise, lifting, or straining.     No housework, yard work or any activity that make you sweat    Do NOT lift more than 10 pounds    Bleeding:      If you start bleeding from the site in your groin, lie down flat and press firmly on/above the site for 10 minutes.     Once bleeding stops, lay flat for 2 hours.     Call the [facility] as soon as you can.       Call 911 right away if you have heavy bleeding or bleeding that does not stop.      Medicines:      If you are taking an antiplatelet medication such as Plavix, do not stop taking it until you talk to your provider.       Take your medications, including blood thinners, unless your provider tells you  "not to.      If you have stopped any medicines, check with your provider about when to restart them.        Follow Up Appointments:      Follow up with [facility] as directed.    Call the clinic if:      You have increased pain or a large or growing hard lump around the site.    The site is red, swollen, hot or tender.    Blood or fluid is draining from the site.    You have chills or a fever greater than 101 F (38 C).    Your leg feels numb, cool or changes color.    You have hives, a rash or unusual itching.    New pain in the back or belly that you cannot control with Tylenol.    Any questions or concerns.    Other Instructions:      If you received a stent - carry your stent card with you at all times.      If you have questions or your original symptoms do not improve, call:         [facility] @ [phone]         Additional Information About Your Visit        MyChart Information     [facility] [employer] [facility] lets you send messages to your doctor, view your test results, renew your prescriptions, schedule appointments and more. To sign up, go to [email or web address] . Click on \"Log in\" on the left side of the screen, which will take you to the Welcome page. Then click on \"Sign up Now\" on the right side of the page.     You will be asked to enter the access code listed below, as well as some personal information. Please follow the directions to create your username and password.     Your access code is: [email or web address]  Expires: 2019  5:39 PM     Your access code will  in 90 days. If you need help or a new code, please call your [place] clinic or [phone].        Care EveryWhere ID     This is your Care EveryWhere ID. This could be used by other organizations to access your [place] medical records  [id number]        Your Vitals Were     Blood Pressure Pulse Temperature Respirations Height Weight    141/64 84 98.9  F (37.2  C) (Oral) 13 1.778 m (5' 10\") 72.3 kg (159 lb 8 oz)    Pulse " Oximetry BMI (Body Mass Index)                100% 22.89 kg/m2           Primary Care Provider Office Phone # Fax #    Santosh Cresencio Ortega [phone]      Equal Access to Services     FILEMON EVANS : Jessica adams thomas nikita Sorhonda, waaxda luqadaha, qaybta kaalmada adejimyada, abundio guzman laPraveenuche yael. So [facility] [phone].    ATENCIÓN: Si habla español, tiene a grant disposición servicios gratuitos de asistencia lingüística. Llame al [phone].    We comply with applicable federal civil rights laws and [place] laws. We do not discriminate on the basis of race, color, national origin, age, disability, sex, sexual orientation, or gender identity.            Thank you!     Thank you for choosing [place] for your care. Our goal is always to provide you with excellent care. Hearing back from our patients is one way we can continue to improve our services. Please take a few minutes to complete the written survey that you may receive in the mail after you visit with us. Thank you!             Medication List: This is a list of all your medications and when to take them. Check marks below indicate your daily home schedule. Keep this list as a reference.      Medications           Morning Afternoon Evening Bedtime As Needed    buPROPion 150 MG 12 hr tablet   Commonly known as:  ZYBAN   Take 1 tablet (150 mg) by mouth 2 times daily                                clopidogrel 75 MG tablet   Commonly known as:  PLAVIX   Take 1 tablet (75 mg) by mouth daily                                * HYDROcodone-acetaminophen 5-325 MG per tablet   Commonly known as:  NORCO   Take 1 tablet by mouth every 6 hours as needed for severe pain                                * HYDROcodone-acetaminophen 5-325 MG per tablet   Commonly known as:  NORCO   Take 1-2 tablets by mouth every 4 hours as needed for pain                                lisinopril-hydrochlorothiazide 10-12.5 MG per tablet   Commonly  known as:  PRINZIDE/ZESTORETIC                                nicotine 21 MG/24HR 24 hr patch   Commonly known as:  NICODERM CQ   Place 1 patch onto the skin daily                                rosuvastatin 40 MG tablet   Commonly known as:  CRESTOR   Take 1 tablet (40 mg) by mouth daily                                * Notice:  This list has 2 medication(s) that are the same as other medications prescribed for you. Read the directions carefully, and ask your doctor or other care provider to review them with you.

## 2018-11-16 NOTE — CONSULTS
[facility]    Vascular Medicine Consultation     Date of Admission:  11/16/2018  Date of Consult (When I saw the patient): 11/16/18         Physician Supervisory Attestation:   I have reviewed and discussed with the physician assistant their history, physical and plan and independently interviewed and examined Grey Candelario and agree with the plan as stated in the physician assistant note.      Grey Candelario is a 53 year old male smoker with a past medical history of hypertension, hyperlipidemia, and PAD who originally presented in 3/2018 with progressively worsening lifestyle limiting claudication, right side worse than left side. He was evaluated by  and at that time his ADRIAN was 0.62 on the right side, which decreased to 0.53 with exercise and on the left side 0.76, which decreased to 0.67 after exercise.  He underwent bilateral lower extremity angiogram and external iliac artery stent placement on 3/23/18. He has continued to follow up with Dr. Castillo for routine surveillance. He recently complained of progressive bilateral lower extremity claudication again and some nocturnal calf cramps. ABIs were 0.78 on the right, decreasing to 0.45 after exercise and 0.76 on the left, decreasing to 0.32 after exercise.  His stents were patent. An angiogram was recommended for further evaluation and possible intervention. He presented for this today.     Reviewed angiogram and discussed with   He underwent,Pelvic angiogram  Left lower extremity angiogram  Right lower extremity angiogram  Aortic and bilateral common iliac artery pressure measurements  Kissing bilateral common iliac artery origin covered stent placement    Access site looks good, excellent palpable DP  And PT post procedure    Follow IR post angiogram protocol  Monitor access site  Monitor CMS  Bedrest  IV fluids  Assistant quitting smoking continue patches and he tried Zyban in the past which did not help offered chantex  and he wanted to wait.  He did not tolerate aspirin in the past continue Plavix 75 mg daily  He was not taking Crestor and his LDL is high restart same dose and repeat lipid panel in 3 months  He will benefit with PAD exercise program and willing to participate order placed, 30 minutes a day 3 times a week for total of 3 months.  Continue lisinopril with hydrochlorothiazide  Follow-up with me after supervised exercise program completion and get fasting lipid profile before the visit.  I have given my card    This note was dictated by utilizing dragon software    Thank you for the consultation    Copy of this note to Dr. Abarca and Dr. Castillo  Primary MD Young Carlos MD,[facility]  Vascular Medicine   11/16/2018      Assessment & Plan   1. PAD s/p bilateral external iliac artery stenting 3/2018 now again with progressively worsening bilateral claudication s/p bilateral common iliac artery stenting 11/16/18    Post-procedure cares per Vascular Surgery/IR. He is unable to tolerate aspirin due to stomach irritation, but has been on Plavix. He should resume this. He would benefit from enrolling in a supervised exercise program. He was agreeable to trying this. Therefore, a referral will be placed for PAD rehab. He should stop smoking and take his cholesterol medication as prescribed.      2. Nicotine dependence    He has been smoking 2 packs of cigarettes daily for decades and tried in the past to quit but has been unsuccessful. He is afraid to try Chantix out of concern for side effects. He tried Wellbutrin and claims they did nothing for him. He has patches, but has not been using them. The importance of smoking cessation was once again stressed. He states that he will start using his patches in the coming days.      3. HTN    His blood pressure appears to be reasonably controlled with current lisinopril-hydrochlorothiazide. He should continue the same.  DASH diet discussed with the patient and  suggested that he quit smoking. He should avoid NSAIDs, excess alcohol     4. Hyperlipidemia    He is listed as being on Rosuvastatin 40 mg daily. However, he admits that he has not taken this for the past week and he likely has not taken it for some time as his LDL is up to 144. He was encouraged to take this and it will be refilled for him at his local pharmacy. He should repeat a lipid panel in 3 months through his primary care provider.         Reason for Consult   Reason for consult: Asked by Dr. Castillo to evaluate and help manage vascular risk factors in this 53 year old male smoker with hyperlipidemia, hypertension, and PAD s/p bilateral external iliac artery stenting 3/2018 now with recurrent bilateral lower extremity claudication and presenting for an angiogram at which time his bilateral common iliac arteries were stented.     Primary Care Physician   Santosh Ortega      History of Present Illness   Grey Candelario is a 53 year old male smoker with a past medical history of hypertension, hyperlipidemia, and PAD who originally presented in 3/2018 with progressively worsening lifestyle limiting claudication, right side worse than left side. He was evaluated by  and at that time his ADRIAN was 0.62 on the right side, which decreased to 0.53 with exercise and on the left side 0.76, which decreased to 0.67 after exercise.  He underwent bilateral lower extremity angiogram and external iliac artery stent placement on 3/23/18. He has continued to follow up with Dr. Castillo for routine surveillance. He recently complained of progressive bilateral lower extremity claudication again and some nocturnal calf cramps. ABIs were 0.78 on the right, decreasing to 0.45 after exercise and 0.76 on the left, decreasing to 0.32 after exercise.  His stents were patent. An angiogram was recommended for further evaluation and possible intervention. He presented for this today.       Past Medical History   Past Medical  History:   Diagnosis Date     HTN (hypertension)      Hyperlipidemia    PAD    Past Surgical History   Past Surgical History:   Procedure Laterality Date     OTHER SURGICAL HISTORY      hip surgery , hardware in place.   Bilateral external iliac artery stents 2018    Prior to Admission Medications   Prior to Admission Medications   Prescriptions Last Dose Informant Patient Reported? Taking?   HYDROcodone-acetaminophen (NORCO) 5-325 MG per tablet More than a month at Unknown time  Yes No   Sig: Take 1 tablet by mouth every 6 hours as needed for severe pain   HYDROcodone-acetaminophen (NORCO) 5-325 MG per tablet More than a month at Unknown time  No No   Sig: Take 1-2 tablets by mouth every 4 hours as needed for pain   Patient not taking: Reported on 11/8/2018   buPROPion (ZYBAN) 150 MG 12 hr tablet More than a month at Unknown time  No No   Sig: Take 1 tablet (150 mg) by mouth 2 times daily   Patient not taking: Reported on 11/8/2018   clopidogrel (PLAVIX) 75 MG tablet 11/14/2018  No No   Sig: Take 1 tablet (75 mg) by mouth daily   lisinopril-hydrochlorothiazide (PRINZIDE/ZESTORETIC) 10-12.5 MG per tablet 11/16/2018 at 0600  Yes Yes   nicotine (NICODERM CQ) 21 MG/24HR 24 hr patch More than a month at Unknown time  No No   Sig: Place 1 patch onto the skin daily   Patient not taking: Reported on 11/8/2018   rosuvastatin (CRESTOR) 40 MG tablet Past Week at Unknown time  No Yes   Sig: Take 1 tablet (40 mg) by mouth daily      Facility-Administered Medications: None     Allergies   Allergies   Allergen Reactions     Morphine Hcl Hives       Social History   Grey Candelario  reports that he has been smoking.  He has been smoking about 2.00 packs per day. He has never used smokeless tobacco. He reports that he drinks alcohol. He reports that he does not use illicit drugs.    Family History   No family history on file.    Review of Systems   The 10 point Review of Systems is negative other than noted in the HPI or here.      Physical Exam   Temp: 98.9  F (37.2  C) Temp src: Oral BP: 122/59 Pulse: 84 Heart Rate: 71 Resp: 11 SpO2: 92 % O2 Device: None (Room air)    Vital Signs with Ranges  Temp:  [98.9  F (37.2  C)] 98.9  F (37.2  C)  Pulse:  [84] 84  Heart Rate:  [67-89] 71  Resp:  [9-20] 11  BP: (118-150)/(56-77) 122/59  SpO2:  [92 %-98 %] 92 %  159 lbs 8 oz    Constitutional: awake, alert, cooperative, no apparent distress, and appears stated age  Eyes: Lids and lashes normal, pupils equal, round and reactive to light, extra ocular muscles intact, sclera clear, conjunctiva normal  ENT: normocepalic, without obvious abnormality, oropharynx pink and moist  Hematologic / Lymphatic: no lymphadenopathy  Respiratory: No increased work of breathing, good air exchange, clear to auscultation bilaterally, no crackles or wheezing  Cardiovascular: regular rate and rhythm, normal S1 and S2 and no murmur noted  GI: Normal bowel sounds, soft, non-distended, non-tender  Skin: no redness, warmth, or swelling, no rashes, groin sites c/d/i.   Musculoskeletal: There is no redness, warmth, or swelling of the joints.  Full range of motion noted.  Motor strength is 5 out of 5 all extremities bilaterally.  Tone is normal.  Neurologic: Awake, alert, oriented to name, place and time.  Cranial nerves II-XII are grossly intact.  Motor is 5 out of 5 bilaterally.  Neuropsychiatric:  Normal affect, memory, insight.  Pulses: 3+ palpable DP/PT pulses bilaterally. No carotid bruits appreciated.     Data   Most Recent 3 CBC's:  Recent Labs   Lab Test  11/16/18   1120  08/10/18   1745  03/23/18   0643   WBC  7.8  7.9  5.6   HGB  13.4  13.3  13.7   MCV  99  96  98   PLT  260  208  188     Most Recent 3 BMP's:  Recent Labs   Lab Test  11/16/18   1120  08/10/18   1745  03/23/18   0643 11/10/17   NA   --   136   --    --    POTASSIUM   --   3.9   --   4.6   CHLORIDE   --   101   --    --    CO2   --   25   --    --    BUN   --   15   --    --    CR  0.78  0.86  0.74   0.67*   ANIONGAP   --   10   --    --    MARY   --   8.9   --    --    GLC   --   88   --   95     Most Recent 3 INR's:  Recent Labs   Lab Test  11/16/18   1120  03/23/18   0643   INR  0.85*  0.96     Most Recent Cholesterol Panel:  Recent Labs   Lab Test  11/16/18   1120   CHOL  255*   LDL  144*   HDL  96   TRIG  76     Most Recent Hemoglobin A1c:  Recent Labs   Lab Test  11/16/18   1120   A1C  4.9

## 2018-11-16 NOTE — PROGRESS NOTES
Interventional Radiology Intra-procedural Nursing Note    Patient Name: Grey Candelario  Medical Record Number: [id number]  Today's Date: November 16, 2018    Start Time: 1256  End of procedure time: 1420  Procedure: Bilateral Lower Extremity Angiogram  Report given to: Marcelina SIMPSON, Care Suites  Time pt departs:  1451      Other Notes: Patient in IR1 for bilateral lower extremity angiogram and possible intervention. Patient reports bilateral popliteal pain and lower extremity weakness. Pulses 2+ in bilateral lower extremities prior to procedure.     5F sheath placed in R femoral artery at 1300. Pressure Monitoring: Aorta MAP: 67; R Common Iliac MAP: 71; L Common Iliac MAP: 72  Sheath exchanged for 7F at 1352. 7F sheath placed in L femoral artery at 1358.  Kissing [place] Viabahn stents placed in bilateral common iliac arteries. Patient tolerated procedure.     Right sheath removed at 1425, Left sheath removed at 1428. Manual pressure held until hemostasis, no hematoma. Dressings C/D/I.    4 mg midazolam and 200 mcg fentanyl given for sedation. Post procedure lower extremity pulses unchanged.    Fiordaliza Licona, RN

## 2018-11-17 NOTE — CONSULTS
Consult Date:  11/16/2018      VASCULAR SURGERY CONSULTATION       CHIEF COMPLAINT:  Bilateral leg and buttock claudication.      HISTORY OF PRESENT ILLNESS:  This 53-year-old patient has a history of peripheral artery disease and has been followed by myself in our Vascular office.  He underwent angiography with bilateral external iliac artery angioplasty and stenting on 03/23/2018.  Very minimal disease was noted infrainguinally bilaterally.  He had a significant improvement of his claudication symptoms.  His job requires that he walk a great deal.  He has been able to decrease his smoking down to half pack of cigarettes daily and knows he needs to quit completely.  He started noticing recurrent claudication type symptoms involving both his buttocks.  He did have a steroid injection with no improvement.      We were concerned about his ADRIAN with exercise performed in 07/2018, which decreased more than one would have expected.  Duplex at that time revealed no in-stent stenosis of the bilateral external iliac artery stents.  He continued to have excellent distal pulses.      The patient returned to the office on 11/8/2018.  Repeat ABIs were 0.78 on the right, decreasing to 0.45 with exercise, and the left 0.76, decreasing to 0.32.  He experienced fatigue in both of his legs at 2 minutes and pain at 3 minutes.  He still continued to have excellent pedal pulses bilaterally.  We felt that he may have worsening aortoiliac disease or in-stent stenosis.  Because of his worsening symptoms and very abnormal ABIs at rest and with exercise, we felt repeat angiogram was indicated.      HOSPITAL COURSE:  The patient underwent an aortogram with bilateral runoff by Dr. Abarca of Interventional Radiology.  Mild disease was noted in the aorta.  There appeared to be some narrowing of both origins of the common iliac arteries.  Pressure gradients were relatively minimal.  Very minimal stenosis was noted within the external iliac  stents.  Again, very mild disease in the common femoral and superficial femoral, but is essentially normal runoff, with completely normal 3-vessel runoff in both of his legs.      Due to the symptoms, particularly the buttock claudication, we felt these common iliac artery lesions may be more significant than noted by the angiographic films and pressure gradients, and thus bilateral common iliac artery angioplasty and stents were placed with very good clinical results.  The patient tolerated this very well with no groin hematoma.  Following the procedure, +3 palpable dorsalis pedis and posterior tibial pulses noted bilaterally.      ADMISSION LABORATORY:  Serum creatinine 0.78.  Total cholesterol 255, HDL 96, , triglycerides 76, hemoglobin 13.4.  The patient saw Dr. Carlos of Vascular Medicine Service.  He again recommended complete smoking cessation and discussed the use of patches and Zyban, which he was not interested in.  He was continued on his Plavix 75 mg daily.  He had been recommended in the past to start Crestor, which he did not do, and with his elevated LDL above our goal of 70 with patients with known vascular problems, it was recommended that he start this medication.  I also discussed a PAD exercise program.      FINAL DIAGNOSES:   1.  Bilateral buttock and calf claudication symptoms with decreased ADRIAN at rest and exercise.     a . Status post aortogram with bilateral common iliac artery stenting.   b.  Status post bilateral common iliac artery angioplasty and stenting.   2.  Ongoing tobacco abuse.   3.  Hyperlipidemia.   4.  Well-controlled hypertension.      PLAN:  The patient will be discharged to home.  I discussed this with his wife.  He will gradually increase his activity.  Strongly encouraged to complete smoking cessation.  He will follow up with me in the office in several weeks.  We will plan a repeat ADRIAN with exercise in several months.         BLANCHE SUNSHINE MD             D:  2018   T: 2018   MT: RADHA      Name:     MARY LOU OWENS   MRN:      [id number]        Account:       [id number]   :      1965           Consult Date:  2018      Document: [id number]       cc: Santosh Ortega MD

## 2018-11-17 NOTE — PROGRESS NOTES
Pt off bedrest @ 2030, no sign of bleeding/hematom in bilateral groin sites. IV removed, pt dressed and walked out to nurses station c/o rt groin site pain and swelling. RN assessed site and found moderate sized hematoma, scant amt drainage. RN applied pressure to rt groin site for 20 min, hematoma/swelling diminished, pt denies n/t, c/p, SOB. Pt on bedrest until 2300, will attempt to ambulate at that time.

## 2018-11-17 NOTE — DISCHARGE INSTRUCTIONS
Peripheral Angiogram Discharge Instructions - Femoral     After you go home:      Have an adult stay with you until tomorrow.    Drink extra fluids for 2 days.    You may resume your normal diet.    No smoking       For 24 hours - due to the sedation you received:    Relax and take it easy.    Do NOT make any important or legal decisions.    Do NOT drive or operate machines at home or at work.    Do NOT drink alcohol.    Care of Groin Puncture Site:      For the first 24 hrs - check the puncture site every 1-2 hours while awake.    For 2 days, when you cough, sneeze, laugh or move your bowels, hold your hand over the puncture site and press firmly.    Remove the bandaid after 24 hours. If there is minor oozing, apply another bandaid and remove it after 12 hours.    It is normal to have a small bruise or pea size lump at the site.    You may shower tomorrow.  Do NOT take a bath, or use a hot tub or pool for at least 3 days. Do NOT scrub the site. Do not use lotion or powder near the puncture site.     Activity:            For 2 days:    No stooping or squatting    Do NOT do any heavy activity such as exercise, lifting, or straining.     No housework, yard work or any activity that make you sweat    Do NOT lift more than 10 pounds    Bleeding:      If you start bleeding from the site in your groin, lie down flat and press firmly on/above the site for 10 minutes.     Once bleeding stops, lay flat for 2 hours.     Call the [facility] as soon as you can.       Call 911 right away if you have heavy bleeding or bleeding that does not stop.      Medicines:      If you are taking an antiplatelet medication such as Plavix, do not stop taking it until you talk to your provider.       Take your medications, including blood thinners, unless your provider tells you not to.      If you have stopped any medicines, check with your provider about when to restart them.        Follow Up Appointments:      Follow up with  [facility] as directed.    Call the clinic if:      You have increased pain or a large or growing hard lump around the site.    The site is red, swollen, hot or tender.    Blood or fluid is draining from the site.    You have chills or a fever greater than 101 F (38 C).    Your leg feels numb, cool or changes color.    You have hives, a rash or unusual itching.    New pain in the back or belly that you cannot control with Tylenol.    Any questions or concerns.    Other Instructions:      If you received a stent - carry your stent card with you at all times.      If you have questions or your original symptoms do not improve, call:         [facility] @ [phone]

## 2018-11-17 NOTE — PROVIDER NOTIFICATION
MD Notification    Notified Person: MD    Notified Person Name: Anna FISH    Notification Date/Time: 11/16/2018/ 09:00 PM    Notification Interaction: Phone    Purpose of Notification: Off bedrest 2030. Butch groin site intact. 2045 Right groin site pain, on assess hematoma about 5 cm. Pressure applied for 20 minutes. Resumed bedrest for 2 hours. Requesting Nicotine patch    Orders Received: 14 mg of nicotine patch. Discharging pending off bedrest    Comments:

## 2018-11-17 NOTE — PLAN OF CARE
Problem: Patient Care Overview  Goal: Plan of Care/Patient Progress Review  Outcome: Improving  A&O x4. VSS on RA. Tele SR. Pt off bedrest @ 2030, ambulated around unit, no sign of bleeding/hematom in bilateral groin sites. IV removed, pt dressed and walked out to nurses station c/o rt groin site pain and swelling. RN assessed site and found moderate sized hematoma, scant amt drainage. RN applied pressure to rt groin site for 20 min, hematoma/swelling diminished, pt denies n/t, c/p, SOB.Keith RN informed Dr. Castillo, pt to be on bedrest until 2300, will attempt to ambulate at that time. Continue to monitor.

## 2018-11-17 NOTE — PROGRESS NOTES
Patient transferred to observation unit for continuation of compassionate care.  Patient has received stent card and has received discharge instructions.  Right groin scant blood, left groin CDI. CMS intact bilaterally.  Denies pain, numbness, tingling. Patient has urinated and is tolerating regular diet.

## 2018-11-17 NOTE — PROGRESS NOTES
Patient's After Visit Summary was reviewed with patient.   Patient verbalized understanding of After Visit Summary, recommended follow up and was given an opportunity to ask questions.   Discharge medications sent home with patient/family: YES, NA   Discharged with significant other        Pt off bed rest @ 2300. Walked around unit and sites remain unchanged. No hematoma, no bleeding. Pt instructed to call if sites begin to bleed and to hold pressure.

## 2018-11-28 DIAGNOSIS — I73.9 PAD (PERIPHERAL ARTERY DISEASE) (H): Primary | ICD-10-CM

## 2019-03-19 ENCOUNTER — TELEPHONE (OUTPATIENT)
Dept: OTHER | Facility: CLINIC | Age: 54
End: 2019-03-19

## 2019-03-19 NOTE — TELEPHONE ENCOUNTER
"Pt overdue for f/u with Dr. Castillo with ADRIAN.   Pt called requesting a note from Dr. Castillo, pt looking to be excused from jury duty due to PAD and hx of angiogram, \"unable to sit for long periods of time\". I explained we don't provide letters for such and recommended he ask his pcp for letter. Pt is adament to obtain letter from Dr. Castillo.     Routing to  to coordinate f/u OV and ADRIAN, pt hoping for asap.     Ro Ferguson, DEANDREN, RN    "

## 2019-03-22 NOTE — TELEPHONE ENCOUNTER
I called pt back, explained he will have to see Dr. Castillo for f/u and then discuss at that time request for letter to be excused from Jury duty, however earliest appt with Dr. Castillo is not until April. If pt wants letter sooner he will have to go through PCP.   Pt notes understanding.     I offered to schedule f/u appt and imaging with Dr. Castillo, pt declined at this time noting he is not sure when he will be on jury duty. Pt will have to call back to schedule f/u at later time.     Ro Ferguson, DEANDREN, RN

## 2019-06-05 NOTE — PROGRESS NOTES
[place] [facility]    Grey Candelario has a history of PAD with left greater than right buttock and leg disabling claudication symptoms.  His work requires any standing and walking all day.  On 3/23/2018 he underwent angioplasty and stenting of both external iliac arteries.  Mild disease was noted in both common femoral and superficial femoral arteries with three-vessel runoff bilaterally.    He had a history of hyperlipidemia and ongoing tobacco abuse.  He had significant improvement of the symptoms but on follow-up last November ABIs were 0.788 on the right decreased to 0.45 with exercise in the left 0.76 decreasing to 0.32 with fatigue and calf discomfort.  Excellent pedal pulses were noted despite these numbers.  We thus performed a aortogram with runoffs on 11/16/2018 revealing some mild narrowing of both common iliac artery origin which was unchanged.  Minimal disease within the iliac stents ends only mild disease in the common femoral and superficial femoral arteries.  There was some pressure gradients across the common iliac arteries and with his buttock claudication this was treated with angioplasty and bilateral common iliac artery stents to see if that would prove his blood flow and symptoms into the internal iliac arteries.    Since his last angiogram with Dr. Abarca on 11/16/2018 he has not noticed any significant improvement of the symptoms.  However, he primarily complains of buttock claudication symptoms more prominent on the left than right side associate with exercise.  He has no known underlying cardiac disease.  We also evaluated his thoracic aorta with a CTA revealing no proximal lesions.    He still smokes 1 pack cigarettes daily but this is actually improvement.  He has been able to stop for short periods of time but unfortunately restarts this.  He is bothered by his ongoing symptoms which does affect his activities.    Medications: Crestor, lisinopril/HCTZ  Exam: Alert and  appropriate.  Walking with no obvious difficulty.               Blood pressure 143/74.  Pulse 73 regular             Chest= clear              Cardiovascular= regular rate              Abdomen= thin, soft, nontender.              Extremities= no edema, normal sensation                   Strong +3 palpable posterior tibial pulses bilaterally.        Ankle-brachial index a remains abnormal.  This is 0.76 at rest with a triphasic/biphasic in the posterior tibial artery on the right decreasing to 0.40 with exercise.  On the left this is 0.77 with a biphasic posterior tibial signal again decreasing to 0.30.  Buttock pain occurred at 2 minutes and right calf pain at 3 minutes.  Left was more prominent the right.      Reviewed with him the angiogram images from last November and placement of the stents.  At that time there was no obvious significant internal iliac disease.      Impression: Ongoing symptoms and ongoing dilemma of exact problem.  With palpable pedal pulses a critical stenosis seems unlikely.  Also very unlikely we have restenosis of the stents.  One would expect the buttock claudication is related to decreased flow to the internal iliac arteries but we think that the aorta and common iliac arteries with the stents are fine.  However, if there is a aortic or proximal iliac lesion this certainly could explain the decreased pressures and ABIs to his legs and also the buttock problems.    I am uncertain of her next step.  I will discuss this with Dr. Abarca performed his left angiogram with you has any further recommendations including the possibility of repeat angiograms.  There certainly is a possibility that he has some back issues but with the abnormal ABIs with exercise vascular etiology appears much more likely.    I also discussed the importance of smoking cessation but unsure whether he will be compliant.    Spent 20 minutes with the patient today with over 50% counseling.  Will contact him after we  have discussed his situationfor further recommendations.    Giorgio Castillo MD     CC:  Dr Bert Abarca

## 2019-06-06 ENCOUNTER — HOSPITAL ENCOUNTER (OUTPATIENT)
Dept: ULTRASOUND IMAGING | Facility: CLINIC | Age: 54
Discharge: HOME | End: 2019-06-06
Attending: SURGERY | Admitting: SURGERY
Payer: COMMERCIAL

## 2019-06-06 ENCOUNTER — OFFICE VISIT (OUTPATIENT)
Dept: OTHER | Facility: CLINIC | Age: 54
End: 2019-06-06
Attending: SURGERY
Payer: COMMERCIAL

## 2019-06-06 VITALS
WEIGHT: 159 LBS | SYSTOLIC BLOOD PRESSURE: 143 MMHG | BODY MASS INDEX: 22.76 KG/M2 | HEIGHT: 70 IN | DIASTOLIC BLOOD PRESSURE: 74 MMHG | HEART RATE: 73 BPM | OXYGEN SATURATION: 98 % | RESPIRATION RATE: 16 BRPM

## 2019-06-06 DIAGNOSIS — I73.9 PAD (PERIPHERAL ARTERY DISEASE) (H): Primary | ICD-10-CM

## 2019-06-06 DIAGNOSIS — I73.9 PAD (PERIPHERAL ARTERY DISEASE) (H): ICD-10-CM

## 2019-06-06 PROCEDURE — G0463 HOSPITAL OUTPT CLINIC VISIT: HCPCS | Mod: 25

## 2019-06-06 PROCEDURE — 93924 LWR XTR VASC STDY BILAT: CPT

## 2019-06-06 PROCEDURE — 99214 OFFICE O/P EST MOD 30 MIN: CPT | Mod: ZP | Performed by: SURGERY

## 2019-06-06 ASSESSMENT — MIFFLIN-ST. JEOR: SCORE: 1562.47

## 2019-06-06 NOTE — PROGRESS NOTES
"Grey Candelario is a 53 year old male who presents for:  Chief Complaint   Patient presents with     RECHECK     ADRIAN w/ ex (2:00 VHC; 2:45 WRO) History of bilateral kissing common iliac artery stents on 11/16/18; 2 month follow up to 11/16/18 angiogram         Vitals:    Vitals:    06/06/19 1448   BP: 143/74   BP Location: Left arm   Patient Position: Chair   Cuff Size: Adult Regular   Pulse: 73   Resp: 16   SpO2: 98%   Weight: 159 lb (72.1 kg)   Height: 5' 10\" (1.778 m)       BMI:  Estimated body mass index is 22.81 kg/m  as calculated from the following:    Height as of this encounter: 5' 10\" (1.778 m).    Weight as of this encounter: 159 lb (72.1 kg).    Pain Score:  Data Unavailable        Jo Reina    "

## 2019-06-06 NOTE — LETTER
[facility] [address]  [place] [address]  Phone: [phone]  Fax: [phone]      2019       Re: Grey Candelario - 1965    Grey Candelario has a history of PAD with left greater than right buttock and leg disabling claudication symptoms.  His work requires any standing and walking all day.  On 3/23/2018 he underwent angioplasty and stenting of both external iliac arteries.  Mild disease was noted in both common femoral and superficial femoral arteries with three-vessel runoff bilaterally.     He had a history of hyperlipidemia and ongoing tobacco abuse.  He had significant improvement of the symptoms but on follow-up last November ABIs were 0.788 on the right decreased to 0.45 with exercise in the left 0.76 decreasing to 0.32 with fatigue and calf discomfort.  Excellent pedal pulses were noted despite these numbers.  We thus performed a aortogram with runoffs on 2018 revealing some mild narrowing of both common iliac artery origin which was unchanged.  Minimal disease within the iliac stents ends only mild disease in the common femoral and superficial femoral arteries.  There was some pressure gradients across the common iliac arteries and with his buttock claudication this was treated with angioplasty and bilateral common iliac artery stents to see if that would prove his blood flow and symptoms into the internal iliac arteries.     Since his last angiogram with Dr. Abarca on 2018 he has not noticed any significant improvement of the symptoms.  However, he primarily complains of buttock claudication symptoms more prominent on the left than right side associate with exercise.  He has no known underlying cardiac disease.  We also evaluated his thoracic aorta with a CTA revealing no proximal lesions.     He still smokes 1 pack cigarettes daily but this is actually improvement.  He has been able to stop for short periods of time but  unfortunately restarts this.  He is bothered by his ongoing symptoms which does affect his activities.     Medications: Crestor, lisinopril/HCTZ  Exam: Alert and appropriate.  Walking with no obvious difficulty.               Blood pressure 143/74.  Pulse 73 regular             Chest= clear             Cardiovascular= regular rate             Abdomen= thin, soft, nontender.             Extremities= no edema, normal sensation             Strong +3 palpable posterior tibial pulses bilaterally.      Ankle-brachial index a remains abnormal.  This is 0.76 at rest with a triphasic/biphasic in the posterior tibial artery on the right decreasing to 0.40 with exercise.  On the left this is 0.77 with a biphasic posterior tibial signal again decreasing to 0.30.  Buttock pain occurred at 2 minutes and right calf pain at 3 minutes.  Left was more prominent the right.        Reviewed with him the angiogram images from last November and placement of the stents.  At that time there was no obvious significant internal iliac disease.      Impression: Ongoing symptoms and ongoing dilemma of exact problem.  With palpable pedal pulses a critical stenosis seems unlikely.  Also very unlikely we have restenosis of the stents. One would expect the buttock claudication is related to decreased flow to the internal iliac arteries but we think that the aorta and common iliac arteries with the stents are fine.  However, if there is a aortic or proximal iliac lesion this certainly could explain the decreased pressures and ABIs to his legs and also the buttock problems.     I am uncertain of her next step.  I will discuss this with Dr. Abarca performed his left angiogram with you has any further recommendations including the possibility of repeat angiograms. There certainly is a possibility that he has some back issues but with the abnormal ABIs with exercise vascular etiology appears much more likely.     I also discussed the importance of  smoking cessation but unsure whether he will be compliant.     Will contact him after we have discussed his situationfor further recommendations.       Giorgio Castillo MD

## 2019-06-07 ENCOUNTER — TELEPHONE (OUTPATIENT)
Dept: OTHER | Facility: CLINIC | Age: 54
End: 2019-06-07

## 2019-06-07 NOTE — RESULT ENCOUNTER NOTE
Discussed the situation with Dr Abarca.  He also is surprised with the patient's symptoms and degree of abnormal ADRIAN with exercise despite the recent angiographic findings.  Certainly does raise the possibility there may be a more proximal problem perhaps in the aorta.  We have done a CTA of the thoracic aorta and this is normal and did not appreciate this on the angiogram.  However, angiograms can sometimes miss plaquing.  He suggests the next test would be a CTA of the aorta and iliac arteries to help evaluate this further.  We will have this scheduled.      Giorgio Castillo MD

## 2019-06-07 NOTE — TELEPHONE ENCOUNTER
Per Dr. Castillo, pt needs to obtain CTA abdomen/pelvis and OV to discuss results.  Attempted to contact pt to update him of this, LVM stating appointment scheduling will call him later today to coordinate appointments.  Belinda Mcelroy, DEANDREN, RN

## 2019-06-14 ENCOUNTER — HOSPITAL ENCOUNTER (OUTPATIENT)
Dept: CT IMAGING | Facility: CLINIC | Age: 54
Discharge: HOME | End: 2019-06-14
Attending: SURGERY | Admitting: SURGERY
Payer: COMMERCIAL

## 2019-06-14 DIAGNOSIS — I73.9 PAD (PERIPHERAL ARTERY DISEASE) (H): ICD-10-CM

## 2019-06-14 PROCEDURE — 25000125 ZZHC RX 250: Performed by: SURGERY

## 2019-06-14 PROCEDURE — 74174 CTA ABD&PLVS W/CONTRAST: CPT

## 2019-06-14 PROCEDURE — 25000128 H RX IP 250 OP 636: Performed by: SURGERY

## 2019-06-14 RX ORDER — IOPAMIDOL 755 MG/ML
80 INJECTION, SOLUTION INTRAVASCULAR ONCE
Status: COMPLETED | OUTPATIENT
Start: 2019-06-14 | End: 2019-06-14

## 2019-06-14 RX ADMIN — SODIUM CHLORIDE 80 ML: 9 INJECTION, SOLUTION INTRAVENOUS at 16:23

## 2019-06-14 RX ADMIN — IOPAMIDOL 80 ML: 755 INJECTION, SOLUTION INTRAVENOUS at 16:23

## 2019-06-17 ENCOUNTER — TELEPHONE (OUTPATIENT)
Dept: OTHER | Facility: CLINIC | Age: 54
End: 2019-06-17

## 2019-06-17 NOTE — TELEPHONE ENCOUNTER
[place] [facility]    I called Grey Candelario and left a message about his CTA angiogram on 11/16/2018 symptoms which are more of buttock claudication applying a more proximal source of this problem.  Prior angiograms revealed disease in the distal aorta and iliac arteries but none below the inguinal ligament.    CTA was reviewed.  No thoracic or suprarenal aortic issues noted.  On the right there is a high-grade stenosis just beyond the common iliac stent going to the external iliac artery with approximately 50% stenosis on the left.  Some celiac disease is noted but less than 50% with a normal SMA.  Some mild disease in the renal artery also but not clinically significant.  Both common femoral arteries have approximately 50% stenosis.    It certainly possible the iliac recurrent stenosis is the cause of his problems.  I will discuss this further with Dr. Abarca would perform his angiogram on 11/16/2018 and to whom I spoke to about further work-up including the CTA.  Likely repeat angiography due to his ongoing symptoms would be necessary.      Giorgio Castillo MD

## 2019-06-20 DIAGNOSIS — I70.213 ATHEROSCLEROSIS OF NATIVE ARTERIES OF EXTREMITIES WITH INTERMITTENT CLAUDICATION, BILATERAL LEGS (H): Primary | ICD-10-CM

## 2019-06-27 ENCOUNTER — TELEPHONE (OUTPATIENT)
Dept: OTHER | Facility: CLINIC | Age: 54
End: 2019-06-27

## 2019-06-27 NOTE — TELEPHONE ENCOUNTER
Per Dr. Castillo, recommendation made for pt to have an angiogram with Dr. Abarca.  IR order placed.  RN contacted pt to discuss recommendation.  LVM requesting pt to call back to discuss further and schedule angiogram.  Will try to reach pt tomorrow if no call back today.  Belinda Mcelroy, DEANDREN, RN

## 2019-07-05 NOTE — TELEPHONE ENCOUNTER
L/m on home voicemail 7/5/19 to call to discuss/schedule. Twila Nazario -  [facility]

## 2019-08-13 NOTE — TELEPHONE ENCOUNTER
Co-worker documented on paper procedure order that she also left messages on 07/12/19 and 07/17/19.  We have clearly made multiple attempts to reach patient.  Will route to Dr. Castillo's nurse as an FYI and place order in the pending file. Cassie Sheriff,

## 2019-09-09 ENCOUNTER — THERAPY VISIT (OUTPATIENT)
Dept: PHYSICAL THERAPY | Facility: CLINIC | Age: 54
End: 2019-09-09
Payer: COMMERCIAL

## 2019-09-09 DIAGNOSIS — M25.552 HIP PAIN, LEFT: ICD-10-CM

## 2019-09-09 PROCEDURE — 97110 THERAPEUTIC EXERCISES: CPT | Mod: GP | Performed by: PHYSICAL THERAPIST

## 2019-09-09 PROCEDURE — 97140 MANUAL THERAPY 1/> REGIONS: CPT | Mod: GP | Performed by: PHYSICAL THERAPIST

## 2019-09-09 PROCEDURE — 97161 PT EVAL LOW COMPLEX 20 MIN: CPT | Mod: GP | Performed by: PHYSICAL THERAPIST

## 2019-09-09 NOTE — LETTER
[facility]  [address]  [place] [address]  [phone]    September 10, 2019    Re: Grey Candelario   :   1965  MRN:  [id number]   REFERRING PHYSICIAN:   Santosh Ortega    [facility] ATHLETIC [facility] PHYSICAL [facility]    Date of Initial Evaluation:  2019  Visits:  Rxs Used: 1  Reason for Referral:  Hip pain, left    EVALUATION SUMMARY    [facility]    Physical Therapy Initial Examination/Evaluation  2019  Subjective:  Grey Candelario  is a 54 year old  male referred to physical therapy by Dr. Ortega for treatment of low back and B hip pain. General health as reported by patient is fair. Pertinent medical history includes:  High blood pressure and smoking. Other medical history details: calf pain, cold, significant weakness (in) legs. Other surgery history details: fusion l4-l5, angioplasty x2.  Current medications:  High blood pressure medication. Primary job tasks include: lifting/carrying, pushing/pulling, operating a machine/assembly, prolonged standing and repetitive tasks. Patient is ash. Oswestry Score: 16 %.   DOI/onset 19  Mechanism of injury No incident. Patient has had a gradual onset of B hip/buttock pain without incident.  DOS n/a  Prior treatment none.   Chief Complaint:   Limited walking tolerance.   Pain location: B buttock/hips  Quality: at times sharp  Constant/Intermittent: intermittent  Time of day: not time dependent  Symptoms have worsened since onset.    Current pain 3/10.  Pain at best 1/10.  Pain at worst 5/10.    Symptoms aggravated by standing and walking.    Symptoms improved with rest.   Social history: lives and maintains his own home    Occupation: .  Job duties:  Prolonged standing, lifting/carrying, operating a machine.    Patient having difficulty with ADLs: walking.    Patient's goals are to reduce  pain with walking.  Patient reports general health as fair.  Related medical history hx of L4-5 fusion 20 years ago.      Imaging: none currently.    Medications:  none.    Return to MD:  4 weeks.      Re: Grey Candelario   :   1965 Pg 2    Clinical Impression: Patient presents with increased B hip tightness and weakness which are contributing to increased B buttock/hip pain and weakness into his legs with prolonged walking/standing.    Objective:  Standing Alignment:    Lumbar:  Posterior pelvic tilt  Gait:    Gait Type:  Normal     Flexibility/Screens:   Positive screens:  Hip and Lumbar  Lower Extremity:  Decreased left lower extremity flexibility:Hip ER's; Hip Flexors and Hamstrings  Decreased right lower extremity flexibility:  Hip ER's; Hip Flexors and Hamstrings  Lumbar/SI Evaluation  ROM:  Arom wnl lumbar: B hip ER 40 deg; extension 15 deg.  AROM Lumbar:   Flexion:            Normal  Ext:                    75%   Side Bend:        Left:  Normal    Right:  Normal  Rotation:           Left:  Normal    Right:  Normal  Side Glide:        Left:     Right:   Strength: lower abdominals 4/5; B hip flex 4/5; abd 4-/5; ext 4-/5  Lumbar Myotomes:  normal  Lumbar Dermtomes:  normal  Neural Tension/Mobility:    Left side:SLR or Slump  negative.   Right side:   Slump or SLR  negative.   Lumbar Palpation:    Tenderness present at Left:    Piriformis and Iliac Crest  Tenderness present at Right: Piriformis and Iliac Crest  Lumbar Provocation:  Lumbar provocation: (-) FADIR B.  Spinal Segmental Conclusions: No pain with P/A glide lumbar spine  Level: Hypo noted at L3, L4 and L5  SI joint/Sacrum:    SI joint provocation tests (-)    Assessment/Plan:    Patient is a 54 year old male with lumbar and both sides hip complaints.    Patient has the following significant findings with corresponding treatment plan.                Diagnosis 1:  B hip/buttock pain  Pain -  manual therapy, self management and  education  Decreased ROM/flexibility - manual therapy and therapeutic exercise  Decreased strength - therapeutic exercise and therapeutic activities  Impaired muscle performance - neuro re-education  Decreased function - therapeutic activities    Therapy Evaluation Codes:   1) History comprised of:   Personal factors that impact the plan of care:      None.     Re: Grey Candelario   :   1965 Pg 3     Comorbidity factors that impact the plan of care are:      None.     Medications impacting care: None.  2) Examination of Body Systems comprised of:   Body structures and functions that impact the plan of care:      Hip and Lumbar spine.   Activity limitations that impact the plan of care are:      Standing and Walking.  3) Clinical presentation characteristics are:   Stable/Uncomplicated.  4) Decision-Making    Low complexity using standardized patient assessment instrument and/or measureable assessment of functional outcome.  Cumulative Therapy Evaluation is: Low complexity.    Previous and current functional limitations:  (See Goal Flow Sheet for this information)    Short term and Long term goals: (See Goal Flow Sheet for this information)     Communication ability:  Patient appears to be able to clearly communicate and understand verbal and written communication and follow directions correctly.  Treatment Explanation - The following has been discussed with the patient:   RX ordered/plan of care  Anticipated outcomes  Possible risks and side effects  This patient would benefit from PT intervention to resume normal activities.   Rehab potential is good.    Frequency:  1 X week, once daily  Duration:  for 6 weeks  Discharge Plan:  Achieve all LTG.  Independent in home treatment program.  Reach maximal therapeutic benefit.    Thank you for your referral.    INQUIRIES  Therapist:  Liz Mckinney, PT  [facility]  [address] [place] [address] [address] [place] [address]  [address]  Phone: [phone]  Fax: [phone]

## 2019-09-10 PROBLEM — M25.552 HIP PAIN, LEFT: Status: ACTIVE | Noted: 2019-09-10

## 2019-09-10 NOTE — PROGRESS NOTES
[place] Initial Evaluation  Subjective:      General health as reported by patient is fair. Pertinent medical history includes:  High blood pressure and smoking. Other medical history details: calf pain, cold, significant weakness (in) legs.     Other surgery history details: fusion l4-l5, angioplasty x2.  Current medications:  High blood pressure medication.   Primary job tasks include:  Lifting/carrying, pushing/pulling, operating a machine/assembly, prolonged standing and repetitive tasks.           Patient is ash.         Oswestry Score: 16 %                 Objective:  System    Physical Exam    General     ROS    Assessment/Plan:

## 2019-09-10 NOTE — PROGRESS NOTES
[facility] [facility] Initial Evaluation    Physical Therapy Initial Examination/Evaluation  September 9, 2019    Grey Candelario  is a 54 year old  male referred to physical therapy by Dr. Ortega for treatment of low back and B hip pain.      DOI/onset 6-9-19  Mechanism of injury No incident. Patient has had a gradual onset of B hip/buttock pain without incident.  DOS n/a  Prior treatment none.     Chief Complaint:   Limited walking tolerance.   Pain location: B buttock/hips  Quality: at times sharp  Constant/Intermittent: intermittent  Time of day: not time dependent  Symptoms have worsened since onset.    Current pain 3/10.  Pain at best 1/10.  Pain at worst 5/10.    Symptoms aggravated by standing and walking.    Symptoms improved with rest.     Social history: lives and maintains his own home    Occupation: .  Job duties:  Prolonged standing, lifting/carrying, operating a machine.    Patient having difficulty with ADLs: walking.    Patient's goals are to reduce pain with walking.    Patient reports general health as fair.  Related medical history hx of L4-5 fusion 20 years ago.      Imaging: none currently.    Medications:  none.       Return to MD:  4 weeks.      Clinical Impression: Patient presents with increased B hip tightness and weakness which are contributing to increased B buttock/hip pain and weakness into his legs with prolonged walking/standing.    Subjective:  HPI                    Objective:  Standing Alignment:        Lumbar:  Posterior pelvic tilt            Gait:    Gait Type:  Normal         Flexibility/Screens:   Positive screens:  Hip and Lumbar    Lower Extremity:  Decreased left lower extremity flexibility:Hip ER's; Hip Flexors and Hamstrings    Decreased right lower extremity flexibility:  Hip ER's; Hip Flexors and Hamstrings               Lumbar/SI Evaluation  ROM:  Arom wnl lumbar: B hip ER 40 deg; extension 15 deg.  AROM Lumbar:   Flexion:             Normal  Ext:                    75%   Side Bend:        Left:  Normal    Right:  Normal  Rotation:           Left:  Normal    Right:  Normal  Side Glide:        Left:     Right:         Strength: lower abdominals 4/5; B hip flex 4/5; abd 4-/5; ext 4-/5  Lumbar Myotomes:  normal                Lumbar Dermtomes:  normal                Neural Tension/Mobility:      Left side:SLR or Slump  negative.     Right side:   Slump or SLR  negative.   Lumbar Palpation:    Tenderness present at Left:    Piriformis and Iliac Crest  Tenderness present at Right: Piriformis and Iliac Crest    Lumbar Provocation:  Lumbar provocation: (-) FADIR B.      Spinal Segmental Conclusions: No pain with P/A glide lumbar spine    Level: Hypo noted at L3, L4 and L5      SI joint/Sacrum:    SI joint provocation tests (-)                                                       General     ROS    Assessment/Plan:    Patient is a 54 year old male with lumbar and both sides hip complaints.    Patient has the following significant findings with corresponding treatment plan.                Diagnosis 1:  B hip/buttock pain  Pain -  manual therapy, self management and education  Decreased ROM/flexibility - manual therapy and therapeutic exercise  Decreased strength - therapeutic exercise and therapeutic activities  Impaired muscle performance - neuro re-education  Decreased function - therapeutic activities    Therapy Evaluation Codes:   1) History comprised of:   Personal factors that impact the plan of care:      None.    Comorbidity factors that impact the plan of care are:      None.     Medications impacting care: None.  2) Examination of Body Systems comprised of:   Body structures and functions that impact the plan of care:      Hip and Lumbar spine.   Activity limitations that impact the plan of care are:      Standing and Walking.  3) Clinical presentation characteristics are:   Stable/Uncomplicated.  4) Decision-Making    Low complexity using  standardized patient assessment instrument and/or measureable assessment of functional outcome.  Cumulative Therapy Evaluation is: Low complexity.    Previous and current functional limitations:  (See Goal Flow Sheet for this information)    Short term and Long term goals: (See Goal Flow Sheet for this information)     Communication ability:  Patient appears to be able to clearly communicate and understand verbal and written communication and follow directions correctly.  Treatment Explanation - The following has been discussed with the patient:   RX ordered/plan of care  Anticipated outcomes  Possible risks and side effects  This patient would benefit from PT intervention to resume normal activities.   Rehab potential is good.    Frequency:  1 X week, once daily  Duration:  for 6 weeks  Discharge Plan:  Achieve all LTG.  Independent in home treatment program.  Reach maximal therapeutic benefit.    Please refer to the daily flowsheet for treatment today, total treatment time and time spent performing 1:1 timed codes.

## 2019-09-16 ENCOUNTER — THERAPY VISIT (OUTPATIENT)
Dept: PHYSICAL THERAPY | Facility: CLINIC | Age: 54
End: 2019-09-16
Payer: COMMERCIAL

## 2019-09-16 DIAGNOSIS — M25.552 HIP PAIN, LEFT: ICD-10-CM

## 2019-09-16 PROCEDURE — 97140 MANUAL THERAPY 1/> REGIONS: CPT | Mod: GP | Performed by: PHYSICAL THERAPIST

## 2019-09-16 PROCEDURE — 97110 THERAPEUTIC EXERCISES: CPT | Mod: GP | Performed by: PHYSICAL THERAPIST

## 2019-09-16 PROCEDURE — 97530 THERAPEUTIC ACTIVITIES: CPT | Mod: GP | Performed by: PHYSICAL THERAPIST

## 2019-09-17 NOTE — PROGRESS NOTES
Subjective:  HPI                    Objective:  System    Physical Exam    General     ROS    Assessment/Plan:    SUBJECTIVE  Subjective changes as noted by pt:   Pt. reports some progress thus far with his hips with doing the ex's regularly at home.   Current pain level:  4/10   Changes in function:  Yes (See Goal flowsheet attached for changes in current functional level)     Adverse reaction to treatment or activity:  None    OBJECTIVE  Changes in objective findings:  PROM R hip ER 50; L ER 40. Strength B hip flex 5/5; abd 4/5; ext 4/5     ASSESSMENT  Grey continues to require intervention to meet STG and LTG's: PT  Patient is progressing as expected.  Response to therapy has shown an improvement in  pain level and ROM   Progress made towards STG/LTG?  Yes (See Goal flowsheet attached for updates on achievement of STG and LTG)    PLAN  Current treatment program is being advanced to more complex exercises.    PTA/ATC plan:  N/A    Please refer to the daily flowsheet for treatment today, total treatment time and time spent performing 1:1 timed codes.

## 2019-09-23 ENCOUNTER — THERAPY VISIT (OUTPATIENT)
Dept: PHYSICAL THERAPY | Facility: CLINIC | Age: 54
End: 2019-09-23
Payer: COMMERCIAL

## 2019-09-23 DIAGNOSIS — M25.552 HIP PAIN, LEFT: ICD-10-CM

## 2019-09-23 PROCEDURE — 97140 MANUAL THERAPY 1/> REGIONS: CPT | Mod: GP | Performed by: PHYSICAL THERAPIST

## 2019-09-23 PROCEDURE — 97530 THERAPEUTIC ACTIVITIES: CPT | Mod: GP | Performed by: PHYSICAL THERAPIST

## 2019-09-23 PROCEDURE — 97110 THERAPEUTIC EXERCISES: CPT | Mod: GP | Performed by: PHYSICAL THERAPIST

## 2019-09-23 NOTE — PROGRESS NOTES
Subjective:  HPI                    Objective:  System    Physical Exam    General     ROS    Assessment/Plan:    SUBJECTIVE  Subjective changes as noted by pt:   Pt. was doing well with decreasing B hip pain until having a terrible day today at work with increased pain all day.    Current pain level:  5/10   Changes in function:  Yes (See Goal flowsheet attached for changes in current functional level)     Adverse reaction to treatment or activity:  None    OBJECTIVE  Changes in objective findings:  PROM R hip ER 50; L 40. Strength B hip flex 4+/5; abd 4/5; ext 4/5     ASSESSMENT  Grey continues to require intervention to meet STG and LTG's: PT  Patient has experienced an exacerbation of symptoms today.  Response to therapy has shown an improvement in  pain level, ROM  and strength  Progress made towards STG/LTG?  Yes (See Goal flowsheet attached for updates on achievement of STG and LTG)    PLAN  Current treatment program is being advanced to more complex exercises.    PTA/ATC plan:  N/A    Please refer to the daily flowsheet for treatment today, total treatment time and time spent performing 1:1 timed codes.

## 2019-09-30 ENCOUNTER — THERAPY VISIT (OUTPATIENT)
Dept: PHYSICAL THERAPY | Facility: CLINIC | Age: 54
End: 2019-09-30
Payer: COMMERCIAL

## 2019-09-30 DIAGNOSIS — M25.552 HIP PAIN, LEFT: ICD-10-CM

## 2019-09-30 PROCEDURE — 97110 THERAPEUTIC EXERCISES: CPT | Mod: GP | Performed by: PHYSICAL THERAPIST

## 2019-09-30 PROCEDURE — 97140 MANUAL THERAPY 1/> REGIONS: CPT | Mod: GP | Performed by: PHYSICAL THERAPIST

## 2019-09-30 NOTE — LETTER
[facility]TH[facility]IC [facility] PHYSICAL [facility]  [address]  [place] [place] [facility] [address]  [phone]    2019    Re: Grey Candelario   :   1965  MRN:  [id number]   REFERRING PHYSICIAN:   Santosh Ortega    [facility] PHYSICAL [facility]    Date of Initial Evaluation:  19  Visits:  Rxs Used: 4  Reason for Referral:  Hip pain, left    PROGRESS  REPORT    Progress reporting period is from 19 to 19.       SUBJECTIVE  Subjective changes noted by patient:   Pt. plans to return to his MD for further follow up as he continues to have the same tired, heavy feeling in both legs despite making good gains in B hip ROM and strength. He still has difficulty with standing and walking at work.      Current pain level is  4/10.     Previous pain level was  5/10.   Changes in function:  Yes (See Goal flowsheet attached for changes in current functional level)  Adverse reaction to treatment or activity: None    OBJECTIVE  Changes noted in objective findings:  PROM R hip ER 45; L hip ER 50. ext 20. Strength B hip flex 5/5; abd 4+/5; ext 4+/5.     ASSESSMENT/PLAN  Updated problem list and treatment plan: Diagnosis 1:  B hip pain/LBP  Pain -  manual therapy, self management and education  Decreased ROM/flexibility - manual therapy and therapeutic exercise  Decreased strength - therapeutic exercise and therapeutic activities  Impaired muscle performance - neuro re-education  Decreased function - therapeutic activities  STG/LTGs have been met or progress has been made towards goals:  Yes (See Goal flow sheet completed today.)  Assessment of Progress: The patient's progress has plateaued.  Self Management Plans:  Patient is independent in a home treatment program.  Patient is independent in self management of symptoms.  I have re-evaluated this patient and find that the nature, scope, duration and intensity of the therapy is appropriate for the  medical condition of the patient.  Grey continues to require the following intervention to meet STG and LTG's:  PT intervention is no longer required to meet STG/LTG.    Re: Grey Candelario   :   1965    Recommendations:  This patient is ready to be discharged from therapy and continue their home treatment program.    Thank you for your referral.    INQUIRIES  Therapist: Liz Mckinney, PT   [facility]  [address] [place] [address] [id number] [address]  Phone: [phone]  Fax: [phone]

## 2019-09-30 NOTE — PROGRESS NOTES
Subjective:  HPI                    Objective:  System    Physical Exam    General     ROS    Assessment/Plan:    PROGRESS  REPORT    Progress reporting period is from 9-9-19 to 9-30-19.       SUBJECTIVE  Subjective changes noted by patient:   Pt. plans to return to his MD for further follow up as he continues to have the same tired, heavy feeling in both legs despite making good gains in B hip ROM and strength. He still has difficulty with standing and walking at work.      Current pain level is  4/10.     Previous pain level was  5/10.   Changes in function:  Yes (See Goal flowsheet attached for changes in current functional level)  Adverse reaction to treatment or activity: None    OBJECTIVE  Changes noted in objective findings:  PROM R hip ER 45; L hip ER 50. ext 20. Strength B hip flex 5/5; abd 4+/5; ext 4+/5.     ASSESSMENT/PLAN  Updated problem list and treatment plan: Diagnosis 1:  B hip pain/LBP  Pain -  manual therapy, self management and education  Decreased ROM/flexibility - manual therapy and therapeutic exercise  Decreased strength - therapeutic exercise and therapeutic activities  Impaired muscle performance - neuro re-education  Decreased function - therapeutic activities  STG/LTGs have been met or progress has been made towards goals:  Yes (See Goal flow sheet completed today.)  Assessment of Progress: The patient's progress has plateaued.  Self Management Plans:  Patient is independent in a home treatment program.  Patient is independent in self management of symptoms.  I have re-evaluated this patient and find that the nature, scope, duration and intensity of the therapy is appropriate for the medical condition of the patient.  Grey continues to require the following intervention to meet STG and LTG's:  PT intervention is no longer required to meet STG/LTG.    Recommendations:  This patient is ready to be discharged from therapy and continue their home treatment program.    Please refer to the  daily flowsheet for treatment today, total treatment time and time spent performing 1:1 timed codes.

## 2019-10-27 DIAGNOSIS — E78.5 HYPERLIPIDEMIA LDL GOAL <70: ICD-10-CM

## 2019-10-28 RX ORDER — ROSUVASTATIN CALCIUM 40 MG/1
TABLET, COATED ORAL
Qty: 90 TABLET | Refills: 3 | Status: SHIPPED | OUTPATIENT
Start: 2019-10-28

## 2019-10-28 NOTE — TELEPHONE ENCOUNTER
rosuvastatin (CRESTOR) 40 MG tablet  Last Written Prescription Date:  11/16/18  Last Fill Quantity: 90,  # refills: 3   Consult in the hospital on 11/16/18    Future Office Visit:   Next 5 appointments (look out 90 days)    Oct 31, 2019  3:30 PM CDT  Return Visit with Giorgio Castillo MD  [facility] ([facility]) [address]  [facility] [id number] [phone]            Unable to fill per [facility] protocol.  Medication and pharmacy loaded.    Benita Wall RN BSN  Vascular [facility]

## 2019-10-31 ENCOUNTER — OFFICE VISIT (OUTPATIENT)
Dept: OTHER | Facility: CLINIC | Age: 54
End: 2019-10-31
Attending: SURGERY
Payer: COMMERCIAL

## 2019-10-31 VITALS — DIASTOLIC BLOOD PRESSURE: 66 MMHG | SYSTOLIC BLOOD PRESSURE: 149 MMHG | HEART RATE: 84 BPM

## 2019-10-31 DIAGNOSIS — I70.213 ATHEROSCLEROSIS OF NATIVE ARTERIES OF EXTREMITIES WITH INTERMITTENT CLAUDICATION, BILATERAL LEGS (H): Primary | ICD-10-CM

## 2019-10-31 PROCEDURE — G0463 HOSPITAL OUTPT CLINIC VISIT: HCPCS

## 2019-10-31 PROCEDURE — 99214 OFFICE O/P EST MOD 30 MIN: CPT | Mod: ZP | Performed by: SURGERY

## 2019-10-31 NOTE — PROGRESS NOTES
[place] [facility]     Grey Candelario returns for vascular follow-up.  Is a history of PAD with left greater than right buttock and disabling claudication.  He is undergone angioplasty and stenting of both external iliac arteries on 3/23/2018.  Mild disease noted in both common femoral and superficial femoral arteries with three-vessel runoff.    His symptoms improved but his ABIs were still low at rest and significantly decreasing with exercise in November 2018.  Repeat aortogram at that time revealed some mild stenosis of the common iliac arteries which were stable.  Minimal disease within the iliac stents and only mild disease in the common femoral superficial femoral arteries.  Mild pressure gradients across the common iliac arteries was ongoing symptoms these were angioplastied and stented by Dr. Abarca.  Despite this he had noted no significant improvement of the symptoms.      Further work-up to Rule out a more proximal issue was performed with a CTA on 6/14/2019 revealing a normal thoracic aorta.  There appeared to be a high-grade stenosis dyspnea on the right common iliac artery stent before the external iliac artery stent with a 50% stenosis in the left.  Mild celiac disease is noted less than 50% with a normal SMA.  Mild disease in the renal arteries were also noted.  Common femoral arteries had approximately 50% stenoses.  We discussed on the phone the possibility repeating the angiogram.    He does have a history of ongoing tobacco abuse.  Is not a 1 pack of cigarettes daily from 2 packs daily.  Strongly encouraged to quit.    Still has pain somewhat more on the left side.  This is from his hips down to his toes.  This is preventing him from walking any distance.  Very frustrating to him.    From images from his angiograms there is no evidence significant degenerative hip disease.  He is also had an MRI following up his back surgeries last year with no other back issues being noted that could  explain his discomfort.    Last ADRIAN on 6/6/2019 at the patient walking treadmill but he could only go 2 minutes before evaluate lateral buttock pain and 3 minutes primary calf pain on the right.  Index was 0.76 on the right decreasing to 0.40 with exercise in the left 0.77 decreased to 0.30 with exercise.  This ADRIAN with exercise has  not significantly improved with his common iliac artery stenting.    11/16/2018   LDL= 144   serum creatinine= 0.78    I did review the last angiogram the patient and his wife.  Stents were patent.  There appears to be disease distal to the stent in the proximal extraocular arteries.  Origin of the left internal artery is smaller.  Essentially no disease from the common femoral arteries distally bilaterally.      Exam: Alert and appropriate.  Blood pressure 149/66.              Chest= clear               Cardiovascular= regular rate               Abdomen= soft, nontender               Extremities= decreased distal pulses.  No edema.  Normal sensation.      Impression: Ongoing issues that are quite disabling there.  At least partially related to vascular insufficiency by the ABIs with exercise and the imaging is on the angiograms.  I am somewhat surprised that the extent of the symptoms considering the visual images from the prior testing.  However, his back surgeon does not feel this is related to any further back problems and had performed a MRI in the past to confirm this.  Also no convincing evidence of degenerative hip disease.                     With his ongoing issues I feel that we should repeat aortogram with runoffs.  Likely further stenting may be necessary and hopefully this will help resolve his symptoms.  Also encouraged complete smoking cessation again.       Giorgio Castillo MD     CC  Patient Care Team:  Santosh Ortega MD as PCP - General (Family Practice)

## 2019-10-31 NOTE — NURSING NOTE
Patient Education    Procedure: Abdominal aortogram, bilateral lower extremity angiogram with possible intervention  Diagnosis: PAD  Anticoagulation Instruction: n/a  Pre-Operative Physical Exam: You need to have a pre-op physical exam within 30 days of your procedure. Your procedure may be cancelled if you do not have a current History and Physical. Call your PCP's office to schedule.  Allergies:  Updated in Epic  Bowel Prep: n/a  Post Procedure Education: [facility] post-procedure fact sheet reviewed with patient.    Learner(s):patient and spouse  Method: Listening, Reading  Barriers to Learning:No Barrier  Outcome: Patient did verbalize understanding of above education.

## 2019-11-07 ENCOUNTER — TELEPHONE (OUTPATIENT)
Dept: OTHER | Facility: CLINIC | Age: 54
End: 2019-11-07

## 2019-11-07 PROBLEM — M25.552 HIP PAIN, LEFT: Status: RESOLVED | Noted: 2019-09-10 | Resolved: 2019-11-07

## 2019-11-07 NOTE — TELEPHONE ENCOUNTER
Type of procedure: ABDOMINAL AORTOGRAM, BILTERAL LOWER EXTREMITY ANGIOGRAM WITH POSSIBLE INTERVENTION  Location of procedure: [facility] IR  Date and time of procedure: 11/14/19 @ 1:30PM  Requesting Surgeon: DR. SUNSHINE  Performing Surgeon/IR: DR. CRUZ  Pre-Op Appt Date: PT TO SCHEDULE W/DR. GIBBONS  Post-Op Appt Date: PT TO SCHEDULE   Packet sent out: PT ALREADY HAD INFORMATION  Pre-cert/Authorization completed:  Yes  Date: 11/07/19

## 2019-11-14 ENCOUNTER — HOSPITAL ENCOUNTER (OUTPATIENT)
Facility: CLINIC | Age: 54
Setting detail: OBSERVATION
Discharge: HOME | End: 2019-11-15
Attending: RADIOLOGY | Admitting: HOSPITALIST
Payer: COMMERCIAL

## 2019-11-14 ENCOUNTER — APPOINTMENT (OUTPATIENT)
Dept: INTERVENTIONAL RADIOLOGY/VASCULAR | Facility: CLINIC | Age: 54
End: 2019-11-14
Attending: SURGERY
Payer: COMMERCIAL

## 2019-11-14 DIAGNOSIS — I70.213 ATHEROSCLEROSIS OF NATIVE ARTERIES OF EXTREMITIES WITH INTERMITTENT CLAUDICATION, BILATERAL LEGS (H): ICD-10-CM

## 2019-11-14 DIAGNOSIS — I73.9 PAD (PERIPHERAL ARTERY DISEASE) (H): ICD-10-CM

## 2019-11-14 DIAGNOSIS — E78.5 HYPERLIPIDEMIA LDL GOAL <70: Primary | ICD-10-CM

## 2019-11-14 PROBLEM — G89.18 POST-OP PAIN: Status: ACTIVE | Noted: 2019-11-14

## 2019-11-14 LAB
APTT PPP: 32 SEC (ref 22–37)
CHOLEST SERPL-MCNC: 184 MG/DL
CREAT SERPL-MCNC: 0.84 MG/DL (ref 0.66–1.25)
ERYTHROCYTE [DISTWIDTH] IN BLOOD BY AUTOMATED COUNT: 12.4 % (ref 10–15)
GFR SERPL CREATININE-BSD FRML MDRD: >90 ML/MIN/{1.73_M2}
HBA1C MFR BLD: 5.2 % (ref 0–5.6)
HCT VFR BLD AUTO: 40.2 % (ref 40–53)
HDLC SERPL-MCNC: 79 MG/DL
HGB BLD-MCNC: 14 G/DL (ref 13.3–17.7)
INR PPP: 0.94 (ref 0.86–1.14)
KCT BLD-ACNC: 191 SEC (ref 75–150)
LDLC SERPL CALC-MCNC: 90 MG/DL
MCH RBC QN AUTO: 33.8 PG (ref 26.5–33)
MCHC RBC AUTO-ENTMCNC: 34.8 G/DL (ref 31.5–36.5)
MCV RBC AUTO: 97 FL (ref 78–100)
NONHDLC SERPL-MCNC: 105 MG/DL
PLATELET # BLD AUTO: 242 10E9/L (ref 150–450)
RBC # BLD AUTO: 4.14 10E12/L (ref 4.4–5.9)
TRIGL SERPL-MCNC: 77 MG/DL
WBC # BLD AUTO: 8.7 10E9/L (ref 4–11)

## 2019-11-14 PROCEDURE — 25500064 ZZH RX 255 OP 636: Performed by: RADIOLOGY

## 2019-11-14 PROCEDURE — 25800030 ZZH RX IP 258 OP 636: Performed by: RADIOLOGY

## 2019-11-14 PROCEDURE — 27210886 ZZH ACCESSORY CR5

## 2019-11-14 PROCEDURE — 99207 ZZC CDG-HISTORY COMP: MEETS EXP. PROB FOCUSED- DOWN CODED LACK OF PFSH: CPT | Performed by: HOSPITALIST

## 2019-11-14 PROCEDURE — 37221 IR LOWER EXTREMITY ANGIOGRAM BILATERAL: CPT | Mod: 50

## 2019-11-14 PROCEDURE — C1769 GUIDE WIRE: HCPCS

## 2019-11-14 PROCEDURE — 85027 COMPLETE CBC AUTOMATED: CPT | Performed by: RADIOLOGY

## 2019-11-14 PROCEDURE — 82565 ASSAY OF CREATININE: CPT | Performed by: RADIOLOGY

## 2019-11-14 PROCEDURE — 80061 LIPID PANEL: CPT | Performed by: RADIOLOGY

## 2019-11-14 PROCEDURE — 27210845 ZZH DEVICE INFLATION CR5

## 2019-11-14 PROCEDURE — 25000125 ZZHC RX 250

## 2019-11-14 PROCEDURE — 99213 OFFICE O/P EST LOW 20 MIN: CPT | Performed by: SURGERY

## 2019-11-14 PROCEDURE — 85730 THROMBOPLASTIN TIME PARTIAL: CPT | Performed by: RADIOLOGY

## 2019-11-14 PROCEDURE — 25000128 H RX IP 250 OP 636: Performed by: NURSE PRACTITIONER

## 2019-11-14 PROCEDURE — 85610 PROTHROMBIN TIME: CPT | Performed by: RADIOLOGY

## 2019-11-14 PROCEDURE — 27210914 ZZH SHEATH CR8

## 2019-11-14 PROCEDURE — 25000128 H RX IP 250 OP 636: Performed by: RADIOLOGY

## 2019-11-14 PROCEDURE — 25000128 H RX IP 250 OP 636

## 2019-11-14 PROCEDURE — 27210742 ZZH CATH CR1

## 2019-11-14 PROCEDURE — C1725 CATH, TRANSLUMIN NON-LASER: HCPCS

## 2019-11-14 PROCEDURE — 25000132 ZZH RX MED GY IP 250 OP 250 PS 637: Performed by: HOSPITALIST

## 2019-11-14 PROCEDURE — G0378 HOSPITAL OBSERVATION PER HR: HCPCS

## 2019-11-14 PROCEDURE — 85347 COAGULATION TIME ACTIVATED: CPT

## 2019-11-14 PROCEDURE — 36415 COLL VENOUS BLD VENIPUNCTURE: CPT

## 2019-11-14 PROCEDURE — 99204 OFFICE O/P NEW MOD 45 MIN: CPT | Performed by: INTERNAL MEDICINE

## 2019-11-14 PROCEDURE — 40000853 ZZH STATISTIC ANGIOGRAM, STENT, VERTEBRO PLASTY

## 2019-11-14 PROCEDURE — 27210805 ZZH SHEATH CR4

## 2019-11-14 PROCEDURE — 99218 ZZC INITIAL OBSERVATION CARE,LEVL I: CPT | Performed by: HOSPITALIST

## 2019-11-14 PROCEDURE — C1876 STENT, NON-COA/NON-COV W/DEL: HCPCS

## 2019-11-14 PROCEDURE — 83036 HEMOGLOBIN GLYCOSYLATED A1C: CPT | Performed by: RADIOLOGY

## 2019-11-14 RX ORDER — NICOTINE 21 MG/24HR
1 PATCH, TRANSDERMAL 24 HOURS TRANSDERMAL DAILY
Status: DISCONTINUED | OUTPATIENT
Start: 2019-11-14 | End: 2019-11-15 | Stop reason: HOSPADM

## 2019-11-14 RX ORDER — EZETIMIBE 10 MG/1
10 TABLET ORAL DAILY
Qty: 90 TABLET | Refills: 3 | Status: SHIPPED | OUTPATIENT
Start: 2019-11-14 | End: 2023-04-25

## 2019-11-14 RX ORDER — IODIXANOL 320 MG/ML
50 INJECTION, SOLUTION INTRAVASCULAR ONCE
Status: COMPLETED | OUTPATIENT
Start: 2019-11-14 | End: 2019-11-14

## 2019-11-14 RX ORDER — AMOXICILLIN 250 MG
1 CAPSULE ORAL 2 TIMES DAILY PRN
Status: DISCONTINUED | OUTPATIENT
Start: 2019-11-14 | End: 2019-11-15 | Stop reason: HOSPADM

## 2019-11-14 RX ORDER — AMOXICILLIN 250 MG
2 CAPSULE ORAL 2 TIMES DAILY PRN
Status: DISCONTINUED | OUTPATIENT
Start: 2019-11-14 | End: 2019-11-15 | Stop reason: HOSPADM

## 2019-11-14 RX ORDER — HEPARIN SODIUM 1000 [USP'U]/ML
3500 INJECTION, SOLUTION INTRAVENOUS; SUBCUTANEOUS ONCE
Status: COMPLETED | OUTPATIENT
Start: 2019-11-14 | End: 2019-11-14

## 2019-11-14 RX ORDER — LISINOPRIL/HYDROCHLOROTHIAZIDE 10-12.5 MG
2 TABLET ORAL DAILY
Status: DISCONTINUED | OUTPATIENT
Start: 2019-11-15 | End: 2019-11-15 | Stop reason: HOSPADM

## 2019-11-14 RX ORDER — DEXTROSE MONOHYDRATE 25 G/50ML
25-50 INJECTION, SOLUTION INTRAVENOUS
Status: DISCONTINUED | OUTPATIENT
Start: 2019-11-14 | End: 2019-11-14

## 2019-11-14 RX ORDER — LIDOCAINE HYDROCHLORIDE 10 MG/ML
INJECTION, SOLUTION INFILTRATION; PERINEURAL
Status: COMPLETED
Start: 2019-11-14 | End: 2019-11-14

## 2019-11-14 RX ORDER — ACETAMINOPHEN 325 MG/1
650 TABLET ORAL EVERY 4 HOURS PRN
Status: DISCONTINUED | OUTPATIENT
Start: 2019-11-14 | End: 2019-11-15 | Stop reason: HOSPADM

## 2019-11-14 RX ORDER — ONDANSETRON 2 MG/ML
4 INJECTION INTRAMUSCULAR; INTRAVENOUS EVERY 6 HOURS PRN
Status: DISCONTINUED | OUTPATIENT
Start: 2019-11-14 | End: 2019-11-15 | Stop reason: HOSPADM

## 2019-11-14 RX ORDER — SODIUM CHLORIDE 9 MG/ML
INJECTION, SOLUTION INTRAVENOUS CONTINUOUS
Status: DISCONTINUED | OUTPATIENT
Start: 2019-11-14 | End: 2019-11-14

## 2019-11-14 RX ORDER — ACETAMINOPHEN 650 MG/1
650 SUPPOSITORY RECTAL EVERY 4 HOURS PRN
Status: DISCONTINUED | OUTPATIENT
Start: 2019-11-14 | End: 2019-11-15 | Stop reason: HOSPADM

## 2019-11-14 RX ORDER — FENTANYL CITRATE 50 UG/ML
INJECTION, SOLUTION INTRAMUSCULAR; INTRAVENOUS
Status: COMPLETED
Start: 2019-11-14 | End: 2019-11-14

## 2019-11-14 RX ORDER — NALOXONE HYDROCHLORIDE 0.4 MG/ML
.1-.4 INJECTION, SOLUTION INTRAMUSCULAR; INTRAVENOUS; SUBCUTANEOUS
Status: DISCONTINUED | OUTPATIENT
Start: 2019-11-14 | End: 2019-11-14

## 2019-11-14 RX ORDER — FENTANYL CITRATE 50 UG/ML
25-50 INJECTION, SOLUTION INTRAMUSCULAR; INTRAVENOUS EVERY 5 MIN PRN
Status: DISCONTINUED | OUTPATIENT
Start: 2019-11-14 | End: 2019-11-14

## 2019-11-14 RX ORDER — ROSUVASTATIN CALCIUM 20 MG/1
40 TABLET, COATED ORAL DAILY
Status: DISCONTINUED | OUTPATIENT
Start: 2019-11-15 | End: 2019-11-15 | Stop reason: HOSPADM

## 2019-11-14 RX ORDER — FLUMAZENIL 0.1 MG/ML
0.2 INJECTION, SOLUTION INTRAVENOUS
Status: DISCONTINUED | OUTPATIENT
Start: 2019-11-14 | End: 2019-11-14

## 2019-11-14 RX ORDER — HYDROCODONE BITARTRATE AND ACETAMINOPHEN 5; 325 MG/1; MG/1
1-2 TABLET ORAL EVERY 4 HOURS PRN
Status: DISCONTINUED | OUTPATIENT
Start: 2019-11-14 | End: 2019-11-15 | Stop reason: HOSPADM

## 2019-11-14 RX ORDER — ONDANSETRON 4 MG/1
4 TABLET, ORALLY DISINTEGRATING ORAL EVERY 6 HOURS PRN
Status: DISCONTINUED | OUTPATIENT
Start: 2019-11-14 | End: 2019-11-15 | Stop reason: HOSPADM

## 2019-11-14 RX ORDER — SODIUM CHLORIDE 9 MG/ML
INJECTION, SOLUTION INTRAVENOUS CONTINUOUS
Status: DISCONTINUED | OUTPATIENT
Start: 2019-11-14 | End: 2019-11-15 | Stop reason: HOSPADM

## 2019-11-14 RX ORDER — ACETAMINOPHEN 500 MG
500 TABLET ORAL EVERY 6 HOURS PRN
Status: DISCONTINUED | OUTPATIENT
Start: 2019-11-14 | End: 2019-11-14

## 2019-11-14 RX ORDER — NALOXONE HYDROCHLORIDE 0.4 MG/ML
.1-.4 INJECTION, SOLUTION INTRAMUSCULAR; INTRAVENOUS; SUBCUTANEOUS
Status: DISCONTINUED | OUTPATIENT
Start: 2019-11-14 | End: 2019-11-15 | Stop reason: HOSPADM

## 2019-11-14 RX ORDER — IBUPROFEN 400 MG/1
400 TABLET, FILM COATED ORAL EVERY 6 HOURS PRN
Status: ON HOLD | COMMUNITY
End: 2019-11-14

## 2019-11-14 RX ORDER — HEPARIN SODIUM 1000 [USP'U]/ML
INJECTION, SOLUTION INTRAVENOUS; SUBCUTANEOUS
Status: COMPLETED
Start: 2019-11-14 | End: 2019-11-14

## 2019-11-14 RX ORDER — NICOTINE POLACRILEX 4 MG
15-30 LOZENGE BUCCAL
Status: DISCONTINUED | OUTPATIENT
Start: 2019-11-14 | End: 2019-11-14

## 2019-11-14 RX ORDER — LIDOCAINE 40 MG/G
CREAM TOPICAL
Status: DISCONTINUED | OUTPATIENT
Start: 2019-11-14 | End: 2019-11-14

## 2019-11-14 RX ADMIN — MIDAZOLAM HYDROCHLORIDE 0.5 MG: 1 INJECTION, SOLUTION INTRAMUSCULAR; INTRAVENOUS at 14:39

## 2019-11-14 RX ADMIN — NICOTINE 1 PATCH: 21 PATCH, EXTENDED RELEASE TRANSDERMAL at 21:12

## 2019-11-14 RX ADMIN — LIDOCAINE HYDROCHLORIDE 8 ML: 10 INJECTION, SOLUTION INFILTRATION; PERINEURAL at 14:28

## 2019-11-14 RX ADMIN — MIDAZOLAM HYDROCHLORIDE 1 MG: 1 INJECTION, SOLUTION INTRAMUSCULAR; INTRAVENOUS at 14:19

## 2019-11-14 RX ADMIN — MIDAZOLAM HYDROCHLORIDE 1 MG: 1 INJECTION, SOLUTION INTRAMUSCULAR; INTRAVENOUS at 14:26

## 2019-11-14 RX ADMIN — HEPARIN SODIUM 3500 UNITS: 1000 INJECTION, SOLUTION INTRAVENOUS; SUBCUTANEOUS at 15:05

## 2019-11-14 RX ADMIN — SODIUM CHLORIDE: 9 INJECTION, SOLUTION INTRAVENOUS at 13:10

## 2019-11-14 RX ADMIN — FENTANYL CITRATE 25 MCG: 50 INJECTION INTRAMUSCULAR; INTRAVENOUS at 14:39

## 2019-11-14 RX ADMIN — FENTANYL CITRATE 50 MCG: 50 INJECTION INTRAMUSCULAR; INTRAVENOUS at 14:20

## 2019-11-14 RX ADMIN — IODIXANOL 87 ML: 320 INJECTION, SOLUTION INTRAVASCULAR at 16:07

## 2019-11-14 RX ADMIN — MIDAZOLAM HYDROCHLORIDE 0.5 MG: 1 INJECTION, SOLUTION INTRAMUSCULAR; INTRAVENOUS at 15:21

## 2019-11-14 RX ADMIN — FENTANYL CITRATE 25 MCG: 50 INJECTION INTRAMUSCULAR; INTRAVENOUS at 15:21

## 2019-11-14 RX ADMIN — FENTANYL CITRATE 25 MCG: 50 INJECTION INTRAMUSCULAR; INTRAVENOUS at 15:07

## 2019-11-14 RX ADMIN — FENTANYL CITRATE 50 MCG: 50 INJECTION INTRAMUSCULAR; INTRAVENOUS at 14:26

## 2019-11-14 RX ADMIN — HEPARIN SODIUM 10000 UNITS: 10000 INJECTION INTRAVENOUS; SUBCUTANEOUS at 14:20

## 2019-11-14 RX ADMIN — MIDAZOLAM HYDROCHLORIDE 0.5 MG: 1 INJECTION, SOLUTION INTRAMUSCULAR; INTRAVENOUS at 14:52

## 2019-11-14 RX ADMIN — ACETAMINOPHEN 650 MG: 325 TABLET, FILM COATED ORAL at 21:16

## 2019-11-14 RX ADMIN — MIDAZOLAM HYDROCHLORIDE 0.5 MG: 1 INJECTION, SOLUTION INTRAMUSCULAR; INTRAVENOUS at 15:07

## 2019-11-14 RX ADMIN — FENTANYL CITRATE 25 MCG: 50 INJECTION INTRAMUSCULAR; INTRAVENOUS at 14:52

## 2019-11-14 RX ADMIN — HEPARIN SODIUM 10000 UNITS: 10000 INJECTION INTRAVENOUS; SUBCUTANEOUS at 14:19

## 2019-11-14 RX ADMIN — HEPARIN SODIUM 3500 UNITS: 1000 INJECTION INTRAVENOUS; SUBCUTANEOUS at 15:05

## 2019-11-14 ASSESSMENT — MIFFLIN-ST. JEOR: SCORE: 1614.62

## 2019-11-14 NOTE — IR NOTE
Interventional Radiology Intra-procedural Nursing Note    Patient Name: Grey Candelario  Medical Record Number: [id number]  Today's Date: November 14, 2019    Start Time: 1426  End of procedure time: 1532  Procedure: bilateral leg angiogram, bilateral iliac stent placement, angioplasty  Report given to: care suites  Time pt departs:  1605  : n/a    Other Notes:     Versed 4mg IV given  Fentanyl 200mcg IV given  Heparin 3500 units IV given        Patient tolerated procedure well. VSS. Patient alert, respirations regular and unlabored, no c/o pain at this time.   Procedure access site (left brachial) is c/d/i. 6f left brachial arterial sheath was removed at 1540 hemostasis achieved after 15 minutes of manual pressure.  Patient transferred back to care suites in stable condition accompanied by myself.    Eva Boo RN on 11/15/2019 at 7:43 AM

## 2019-11-14 NOTE — PROGRESS NOTES
Care Suites Admission Nursing Note    Reason for admission: LE angiogram with stent placement  CS arrival time: 1200  Accompanied by: RUTHIE Becerra  Name/phone of DC : Mariam HENDRICKS [phone]    Medications held: none  Consent signed: yes  Abnormal assessment/labs: none  If abnormal, provider notified: na  Education/questions answered: yes Dr Abarca into see pt and answered all questions  Plan: cathryn LE angio with stent placement, discharge to home

## 2019-11-14 NOTE — DISCHARGE INSTRUCTIONS
Peripheral Angiogram Discharge Instructions - Brachial    After you go home:      Have an adult stay with you until tomorrow.    Drink extra fluids for 2 days.    You may resume your normal diet.    No smoking       For 24 hours - due to the sedation you received:    Relax and take it easy.    Do NOT make any important or legal decisions.    Do NOT drive or operate machines at home or at work.    Do NOT drink alcohol.    Care of Arm Puncture Site:      For the first 24 hrs - check the puncture site every 1-2 hours while awake.    Remove the bandaid after 24 hours. If there is minor oozing, apply another bandaid and remove it after 12 hours.    It is normal to have a small bruise or pea size lump at the site.    You may shower tomorrow. Do NOT take a bath, or use a hot tub or pool for at least 3 days. Do NOT scrub the site. Do not use lotion or powder near the puncture site.     Activity:      For 2 days, do not use your hand or arm to support your weight (such as rising from a chair)     For 2 days, do not lift more than 5 pounds or exercise your arm (such as tennis, golf or bowling).    Bleeding:      If you start bleeding from the site in your arm, sit down and press firmly on/above the site for 10 minutes.     Once bleeding stops, keep your arm straight for 2 hours.     Call the [facility] as soon as you can.       Call 911 right away if you have heavy bleeding or bleeding that does not stop.      Medicines:      If you are on Metformin (Glucophage) and your GFR (kidney function level) is >30, you may continue taking your Metformin.    If you are on Metformin (Glucophage) and your GFR (kidney function level) is <30, do not restart the Metformin for 48 hours after your procedure. Check with your primary care giver before restarting the Metformin to see if you need to have blood drawn to recheck your kidney function (GFR).    If you are taking an antiplatelet medication such as Plavix, do not stop  taking it until you talk to your provider.       Take your medications, including blood thinners, unless your provider tells you not to.  If you take Coumadin (Warfarin), have your INR checked by your provider in  3-5 days. Call your clinic to schedule this.    If you have stopped any medicines, check with your provider about when to restart them.               Follow Up Appointments:      Follow up with [facility] as directed.    Call the clinic if:      You have increased pain or a large or growing hard lump around the site.    The site is red, swollen, hot or tender.    Blood or fluid is draining from the site.    You have chills or a fever greater than 101 F (38 C).    Your arm feels numb, cool or changes color.    You have hives, a rash or unusual itching.    Any questions or concerns.    Other Instructions:      If you received a stent - carry your stent card with you at all times.      If you have questions or your original symptoms do not improve, call:             [facility] @ [phone]

## 2019-11-14 NOTE — PROGRESS NOTES
Patient back to care suites from IR post procedure.  VS WNL, denies pain, tolerates fluids, and left brachial site is CDI without hematoma or swelling.  Plan for 4 hour of bedrest and patient up at 200 and discontinue at 2030.  Continue to monitor.

## 2019-11-14 NOTE — CONSULTS
VASCULAR SURGERY    Grey Candelario is a day with history of iliac stents but has ongoing issues with claudication.  ABIs have been decreased at rest and also with exercise.  Previous angiograms revealed disease limited to the iliac arteries.  We are concerned that he may have developed restenosis.    Discussed with Dr. Abarca who recommended repeat angiogram.  This was performed today via left brachial approach with no complications.    Angiogram was reviewed.  This did show restenosis between the stents on both the right and left side which were angioplastied with a 7 mm balloon with good results.  Runoffs were also evaluated bilaterally.  Mild/moderate disease in the right SFA and mid popliteal artery with normal three-vessel runoff to his foot.  Mild diffuse disease in the left SFA all less than 10% sent.  Also mild above-knee left popliteal disease.  Again with three-vessel runoff.    Due to the issues that he is had with decreasing ABIs Dr. Abarca did angioplasty of the right SFA and popliteal stenosis with good results.    Admission serum creatinine= 0.84   LDL= 90 A1c= 5.2    Patient continues to smoke and strongly encouraged to quit.  Recommendations were made by Dr Carlos    Impression: Evidence of stenosis of iliac arteries between stent status post bilateral angioplasty.  Also angioplasty of the right mid SFA and popliteal arteries essentially normal runoff except for some mild changes.  Hopefully this will improve his symptoms and to follow-up in the office.  Also will discuss the importance of good risk factor control which definitely needs to quit smoking completely and he is aware of this.                We will repeat ADRIAN with exercise in 3 months.      Giorgio Castillo MD

## 2019-11-14 NOTE — PRE-PROCEDURE
GENERAL PRE-PROCEDURE:   Procedure:  Aortogram, bilateral leg angiogram with possible angioplasty and/or stent placement with IV moderate sedation   Date/Time:  11/14/2019 1:54 PM    Written consent obtained?: Yes    Risks and benefits: Risks, benefits and alternatives were discussed    Consent given by:  Patient  Patient states understanding of procedure being performed: Yes    Patient's understanding of procedure matches consent: Yes    Procedure consent matches procedure scheduled: Yes    Expected level of sedation:  Moderate  Appropriately NPO:  Yes  ASA Class:  Class 3- Severe systemic disease, definite functional limitations  Mallampati  :  Grade 1- soft palate, uvula, tonsillar pillars, and posterior pharyngeal wall visible  Lungs:  Lungs clear with good breath sounds bilaterally  Heart:  Normal heart sounds and rate and systolic murmur  History & Physical reviewed:  History and physical reviewed and no updates needed  Statement of review:  I have reviewed the lab findings, diagnostic data, medications, and the plan for sedation

## 2019-11-14 NOTE — CONSULTS
[facility]    Vascular Medicine Consultation     Date of Admission:  11/14/2019  Date of Consult (When I saw the patient): 11/14/19         Physician Supervisory Attestation:   I have reviewed and discussed with the physician assistant their history, physical and plan and independently interviewed and examined Grey Candelario and agree with the plan as stated in the physician assistant note.    He has been experiencing lifestyle limiting claudication left more than right buttock claudication with known history of a PAD underwent bilateral external iliac artery stenting in March 2018 followed by bilateral common iliac artery stenting in November 2018.  He still continues to smoke.    Follow post angiogram protocol, monitor access site and CMS  IV fluids  Bedrest  Assist in quitting smoking, see below  Continue current statin and add Zetia 10 mg daily, repeat lipid panel in 3 months  Continue current outpatient blood pressure medications    Thank you for the consultation !  This note was dictated by utilizing dragon software    Copy of this dictation to , Dr. Fregoso and primary MD      Young Carlos MD , [facility]  Vascular medicine service  11/14/2019          Assessment & Plan   1. PAD s/p bilateral external iliac artery stenting 3/2018 followed by bilateral common iliac artery stenting 11/16/18 now with ongoing lifestyle limiting left > right buttock claudication      He presented for an angiogram today. Post-procedure cares per Vascular Surgery/IR. He is unable to tolerate aspirin due to stomach irritation, but has been on Plavix without problem in the past (although is no longer on this). He should stop smoking and take intensify his cholesterol medication as prescribed.      2. Nicotine dependence     He has been smoking 2 packs of cigarettes daily for decades and tried in the past to quit but has been unsuccessful. He is afraid to try Chantix out of concern for side  effects. He tried Wellbutrin for a few months and claims it did nothing for him. 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 He feels the habit with hand/mouth is too difficult for him to break. The importance of smoking cessation was once again stressed. He was advised to try either Chantix or nicotine patches starting at 21 mg daily for 2 months, then decreasing to 14 mg daily for 2 months, and then finally 7 mg daily for 2 months. He wanted to think about it and feels he will ultimately talk with his primary care provider about trying Chantix.      3. HTN     His blood pressure appears to be reasonably controlled with current lisinopril-hydrochlorothiazide. He should continue the same.  DASH diet discussed with the patient and suggested that he quit smoking. He should avoid NSAIDs and excess alcohol.     4. Hyperlipidemia     His LDL is noted to be 90 while on Rosuvastatin 40 mg daily. He states that he takes this daily and never misses a dose. Given his progressive PAD, it is recommended that he be treated more aggressively to an LDL of less than 70. He is unable to get to that goal on the highest dose of the most potent statin available. As such, he will also be started on Zetia 10 mg daily. He should repeat a lipid panel in 3 months through his primary care provider to ascertain whether or not he is at goal on this regimen.       Reason for Consult   Reason for consult: Asked by Dr. Castillo to evaluate and help manage vascular risk factors in this 54 year old male smoker with hyperlipidemia, hypertension, and PAD s/p bilateral external iliac artery stenting 3/2018 and bilateral common iliac artery stenting now with recurrent bilateral lower extremity claudication presenting for an angiogram.    Primary Care Physician   Santosh Ortega      History of Present Illness   Grey Candelario is a 54 year old male smoker with a past medical history of hypertension,  hyperlipidemia, and PAD who originally presented in 3/2018 with progressively worsening lifestyle limiting claudication, right side worse than left side. He was evaluated by  and at that time his ADRIAN was 0.62 on the right side, which decreased to 0.53 with exercise and on the left side 0.76, which decreased to 0.67 after exercise.  He underwent bilateral lower extremity angiogram and external iliac artery stent placement on 3/23/18. In 11/2018 he again complained of progressive bilateral lower extremity claudication and some nocturnal calf cramps. ABIs were 0.78 on the right, decreasing to 0.45 after exercise and 0.76 on the left, decreasing to 0.32 after exercise.  His stents were patent. An angiogram was undertaken bilateral common iliac artery stents were placed. This never really fully resolved his symptoms however. He continues to have mainly left buttock claudication which significantly impacts his activity. He states he is taking all medication as prescribed. He continues to smoke over one pack per day, but this is actually less than he used to. ABIs were repeated and 0.76 on the right at rest, dropping to 0.40 with exercise and 0.77 on the left, dropping to 0.30 with exercise. A CTA showed all stents were patent but there is high-grade stenosis of the right common iliac artery bifurcation and less than 50% stenosis of the distal common iliac artery on the left. There are bilateral common femoral artery stenoses related to dense calcific plaque. Stenosis was also noted at the origin of the celiac axis and the left renal artery.It was recommended that he present for an angiogram for further evaluation and possible intervention.     Past Medical History   Past Medical History:   Diagnosis Date     HTN (hypertension)      Hx of heart artery stent     2018 2 stents placed     Hyperlipidemia        Past Surgical History   Past Surgical History:   Procedure Laterality Date     OTHER SURGICAL HISTORY       hip surgery , hardware in place.       Prior to Admission Medications   Prior to Admission Medications   Prescriptions Last Dose Informant Patient Reported? Taking?   buPROPion (ZYBAN) 150 MG 12 hr tablet More than a month at Unknown time  No No   Sig: Take 1 tablet (150 mg) by mouth 2 times daily   Patient not taking: Reported on 10/31/2019   ibuprofen (ADVIL/MOTRIN) 400 MG tablet Past Week at Unknown time  Yes Yes   Sig: Take 400 mg by mouth every 6 hours as needed for moderate pain   lisinopril-hydrochlorothiazide (PRINZIDE/ZESTORETIC) 10-12.5 MG per tablet 11/14/2019 at Unknown time  Yes Yes   nicotine (NICODERM CQ) 21 MG/24HR 24 hr patch   No No   Sig: Place 1 patch onto the skin daily   Patient not taking: Reported on 10/31/2019   rosuvastatin (CRESTOR) 40 MG tablet 11/14/2019 at Unknown time  No Yes   Sig: TAKE 1 TABLET BY MOUTH ONE TIME DAILY      Facility-Administered Medications: None     Allergies   Allergies   Allergen Reactions     Morphine Hcl Hives       Social History   Grey Candelario  reports that he has been smoking. He has been smoking about 1.00 pack per day. He has never used smokeless tobacco. He reports current alcohol use. He reports that he does not use drugs.    Family History   No family history of PAD. All other family history non-contributory.     Review of Systems   The 10 point Review of Systems is negative other than noted in the HPI or here.     Physical Exam   Temp: 98.4  F (36.9  C) Temp src: Oral BP: (!) 148/61 Pulse: 80 Heart Rate: 81 Resp: 14 SpO2: 96 % O2 Device: None (Room air)    Vital Signs with Ranges  Temp:  [98.4  F (36.9  C)] 98.4  F (36.9  C)  Pulse:  [75-96] 80  Heart Rate:  [77-86] 81  Resp:  [14-21] 14  BP: (111-162)/(55-69) 148/61  SpO2:  [96 %-100 %] 96 %  171 lbs 9.6 oz    Constitutional: awake, alert, cooperative, no apparent distress, and appears stated age  Eyes: Lids and lashes normal, pupils equal, round and reactive to light, extra ocular muscles intact,  sclera clear, conjunctiva normal  ENT: normocepalic, without obvious abnormality, oropharynx pink and moist  Hematologic / Lymphatic: no lymphadenopathy  Respiratory: No increased work of breathing, good air exchange, clear to auscultation bilaterally, no crackles or wheezing  Cardiovascular: regular rate and rhythm, normal S1 and S2 and no murmur noted  GI: Normal bowel sounds, soft, non-distended, non-tender  Skin: no redness, warmth, or swelling, no rashes and no lesions   Musculoskeletal: There is no redness, warmth, or swelling of the joints.  Full range of motion noted.  Motor strength is 5 out of 5 all extremities bilaterally.  Tone is normal.  Neurologic: Awake, alert, oriented to name, place and time.  Cranial nerves II-XII are grossly intact.  Motor is 5 out of 5 bilaterally.    Neuropsychiatric:  Normal affect, memory, insight.  Pulses: Unable to palpate pedal pulses. No carotid bruits appreciated.     Data   Most Recent 3 CBC's:  Recent Labs   Lab Test 11/14/19  1305 11/16/18  1120 08/10/18  1745   WBC 8.7 7.8 7.9   HGB 14.0 13.4 13.3   MCV 97 99 96    260 208     Most Recent 3 BMP's:  Recent Labs   Lab Test 11/14/19  1305 11/16/18  1120 08/10/18  1745  11/10/17   NA  --   --  136  --   --    POTASSIUM  --   --  3.9  --  4.6   CHLORIDE  --   --  101  --   --    CO2  --   --  25  --   --    BUN  --   --  15  --   --    CR 0.84 0.78 0.86   < > 0.67*   ANIONGAP  --   --  10  --   --    MARY  --   --  8.9  --   --    GLC  --   --  88  --  95    < > = values in this interval not displayed.     Most Recent 3 INR's:  Recent Labs   Lab Test 11/14/19  1305 11/16/18  1120 03/23/18  0643   INR 0.94 0.85* 0.96     Most Recent Cholesterol Panel:  Recent Labs   Lab Test 11/14/19  1305   CHOL 184   LDL 90   HDL 79   TRIG 77     Most Recent Hemoglobin A1c:  Recent Labs   Lab Test 11/14/19  1305   A1C 5.2

## 2019-11-15 VITALS
HEART RATE: 77 BPM | DIASTOLIC BLOOD PRESSURE: 79 MMHG | HEIGHT: 70 IN | WEIGHT: 171.6 LBS | TEMPERATURE: 98.3 F | RESPIRATION RATE: 16 BRPM | OXYGEN SATURATION: 96 % | SYSTOLIC BLOOD PRESSURE: 158 MMHG | BODY MASS INDEX: 24.57 KG/M2

## 2019-11-15 PROCEDURE — 99217 ZZC OBSERVATION CARE DISCHARGE: CPT | Performed by: INTERNAL MEDICINE

## 2019-11-15 PROCEDURE — G0378 HOSPITAL OBSERVATION PER HR: HCPCS

## 2019-11-15 PROCEDURE — 25000132 ZZH RX MED GY IP 250 OP 250 PS 637: Performed by: HOSPITALIST

## 2019-11-15 RX ORDER — CLOPIDOGREL BISULFATE 75 MG/1
75 TABLET ORAL DAILY
Qty: 30 TABLET | Refills: 2 | Status: SHIPPED | OUTPATIENT
Start: 2019-11-15 | End: 2023-04-25

## 2019-11-15 RX ADMIN — NICOTINE 1 PATCH: 21 PATCH, EXTENDED RELEASE TRANSDERMAL at 08:02

## 2019-11-15 RX ADMIN — ACETAMINOPHEN 650 MG: 325 TABLET, FILM COATED ORAL at 01:18

## 2019-11-15 RX ADMIN — LISINOPRIL AND HYDROCHLOROTHIAZIDE 2 TABLET: 12.5; 1 TABLET ORAL at 08:02

## 2019-11-15 RX ADMIN — ROSUVASTATIN CALCIUM 40 MG: 20 TABLET, FILM COATED ORAL at 08:02

## 2019-11-15 ASSESSMENT — COLUMBIA-SUICIDE SEVERITY RATING SCALE - C-SSRS
2. HAVE YOU ACTUALLY HAD ANY THOUGHTS OF KILLING YOURSELF IN THE PAST MONTH?: NO
1. IN THE PAST MONTH, HAVE YOU WISHED YOU WERE DEAD OR WISHED YOU COULD GO TO SLEEP AND NOT WAKE UP?: NO

## 2019-11-15 NOTE — PROGRESS NOTES
"Grey Candelario is a 54 year old male with a history of back surgery, pain and bilateral leg stent placements with continued bilateral claudication pain. He had a bilateral leg angiogram with bilateral iliac stents placed, Right popliteal, right SFA and Left SFA angioplasty in IR yesterday using a left brachial artery access.     Bedrest in care suites with right arm straight for 4 hours. When Grey was off bedrest he bent his arm a little and noticed a feeling of fullness at the puncture site and swelling. He felt dizzy and \"awful\" at that time. Pressure was held on the left brachial site and he was kept overnight for observation.     He is being seen in follow up for discharge today.     S: Feeling fine. Arm is non tender at puncture site. No c/o CMS changes in his left hand. Everything feels normal. His back is sore. He is ready to leave. He had a groin hematoma in the past that was worse than his arm.     O: Temp:  [97.8  F (36.6  C)-98.5  F (36.9  C)] 98.3  F (36.8  C)  Pulse:  [73-96] 77  Heart Rate:  [75-87] 87  Resp:  [11-21] 16  BP: ()/(45-85) 158/79  SpO2:  [93 %-100 %] 96 %    Labs, notes, imaging, and VSs reviewed. No new labs since pre angio. CBC is ordered.     ROUTINE ICU LABS (Last four results)  CMP  Recent Labs   Lab 11/14/19  1305   CR 0.84   GFRESTIMATED >90   GFRESTBLACK >90     CBC  Recent Labs   Lab 11/14/19  1305   WBC 8.7   RBC 4.14*   HGB 14.0   HCT 40.2   MCV 97   MCH 33.8*   MCHC 34.8   RDW 12.4        INR  Recent Labs   Lab 11/14/19  1305   INR 0.94     Arterial Blood GasNo lab results found in last 7 days.    General: Alert, oriented, pleasant male in NAD  Left arm-gauze and tegaderm intact, hand strength good, hand warm = to right hand. No CMS changes. Ecchymosis around gauze, moderately firm around gauze and into antecubital. No new swelling around puncture site per patient.     A/P: S/p post angiogram with left brachial approach c/b hematoma.     Clinically patient is " doing well. No CMS changes.     Reviewed with Dr Abarca. No need for US. Biggest concerns at discharge would be to call if there is new swelling, pain, CMS changes. Will add this to discharge instructions with number to call.     D/w above with Dr Martinez who will also kindly send Grey home with Plavix to take for 3 months per IR.     Appreciate care of Grey post angiogram and overnight.     Thanks [facility] Interventional Radiology CNP ([phone]) (phone [phone])

## 2019-11-15 NOTE — PLAN OF CARE
A&Ox4. VSS on RA. Left brachial site dressing CDI. Some swelling and pain. Tylenol. CMS intact, pulses palpable. Nicotine patch on. Up indep. Regular diet

## 2019-11-15 NOTE — DISCHARGE SUMMARY
[facility]    Discharge Summary  Hospitalist    Date of Admission:  11/14/2019  Date of Discharge:  11/15/2019  Discharging Provider: Bonny Hernandez    Discharge Diagnoses   PAD with hx of bilateral iliac stenting, s/p angioplasty on 11/15  Post-procedure bleeding from brachial access site, resolved  Hypertension  Hyperlipidemia  Tobacco Use    Hospital Course   Grey Candelario is an 54 year old male with PMhx of hypertension, hyperlipidemia and PAD who underwent a bilateral LE angiogram on 11/14/19 via left brachial approach for evaluation of claudication and concerns for possible iliac artery restenosis. Procedure was done per Dr. Abarca (IR) with Dr. eTri watkins. Was found to have evidence of stenosis of the iliac arteries between stents and underwent angioplasty bilaterally. Post-procedure course was complicated by development of bleeding from the brachial artery access site. Bleeding was self limited and resolved after pressure was applied. Monitored in hospital overnight. Condition remained stable. Some bruising noted but UE remained neurologically intact.     Discharged home the followign morning in stable condition. Per recommendations of specialists this stay, was started on a 3mth course of Plavix and Zetia. Continued on statin and antihypertensive without changes. Told by several providers he needs to stop smoking    Will follow up with providers as advised.     Bonny Hernandez, DO    Significant Results and Procedures   Findings on bilateral LE angiogram on 11/14 (formal dictation still pending af time of discharge):   Bilateral SES spanning common to external iliac arteries.   Right popliteal angiogplasty  Right SFA angioplasty  Left SFA angioplasty.     Code Status   Full Code       Primary Care Physician   Santosh Ortega    Physical Exam   Temp: 98.3  F (36.8  C) Temp src: Oral BP: (!) 158/79 Pulse: 77 Heart Rate: 87 Resp: 16 SpO2: 96 % O2 Device: None  (Room air) Oxygen Delivery: 2 LPM  Vitals:    11/14/19 1215   Weight: 77.8 kg (171 lb 9.6 oz)     Vital Signs with Ranges  Temp:  [97.8  F (36.6  C)-98.5  F (36.9  C)] 98.3  F (36.8  C)  Pulse:  [73-96] 77  Heart Rate:  [75-87] 87  Resp:  [11-21] 16  BP: ()/(45-85) 158/79  SpO2:  [93 %-100 %] 96 %  No intake/output data recorded.    General: Resting comfortably, alert, conversive, NAD  CVS: HRRR, no MGR, no LE edema; good peripheral pulses  Respiratory: CTAB, no wheeze/rales/rhonchi, no increased work of breathing  GI: S, NT, ND, +BS  Skin: Warm/dry    Discharge Disposition   Discharged to home  Condition at discharge: Stable    Consultations This Hospital Stay   HOSPITALIST IP CONSULT    Time Spent on this Encounter   IBonny DO, personally saw the patient today and spent less than or equal to 30 minutes discharging this patient.    Discharge Orders      Discharge Instructions    1. Start taking Zetia (Ezetimibe) for your cholesterol in addition to your rosuvastatin.     2. Have your cholesterol rechecked in 3 months.     3. Stop smoking!!! Utilize nicotine patches on a daily basis or discuss starting Chantix with your primary care provider.     Reason for your hospital stay    Monitoring for post-proceudre bleeding.     Follow-up and recommended labs and tests     1. Follow up with your medical providers as previously advised.     Activity    Your activity upon discharge: as directed per radiology, following your recent procedure     Discharge Instructions    You should begin taking Plavix on 11/16/19. You will take it for a total of 3 months.     Diet    Follow this diet upon discharge: Regular     Discharge Medications   Current Discharge Medication List      START taking these medications    Details   clopidogrel (PLAVIX) 75 MG tablet Take 1 tablet (75 mg) by mouth daily  Qty: 30 tablet, Refills: 2    Comments: Future refills by PCP Dr. Santosh Ortega with phone number  [phone].  Associated Diagnoses: PAD (peripheral artery disease) (H)      ezetimibe (ZETIA) 10 MG tablet Take 1 tablet (10 mg) by mouth daily  Qty: 90 tablet, Refills: 3    Associated Diagnoses: Hyperlipidemia LDL goal <70         CONTINUE these medications which have NOT CHANGED    Details   lisinopril-hydrochlorothiazide (PRINZIDE/ZESTORETIC) 10-12.5 MG per tablet Take 2 tablets by mouth every morning       rosuvastatin (CRESTOR) 40 MG tablet TAKE 1 TABLET BY MOUTH ONE TIME DAILY  Qty: 90 tablet, Refills: 3    Associated Diagnoses: Hyperlipidemia LDL goal <70         STOP taking these medications       buPROPion (ZYBAN) 150 MG 12 hr tablet Comments:   Reason for Stopping:             Allergies   Allergies   Allergen Reactions     Morphine Hcl Hives     Data   Most Recent 3 CBC's:  Recent Labs   Lab Test 11/14/19  1305 11/16/18  1120 08/10/18  1745   WBC 8.7 7.8 7.9   HGB 14.0 13.4 13.3   MCV 97 99 96    260 208      Most Recent 3 BMP's:  Recent Labs   Lab Test 11/14/19  1305 11/16/18  1120 08/10/18  1745  11/10/17   NA  --   --  136  --   --    POTASSIUM  --   --  3.9  --  4.6   CHLORIDE  --   --  101  --   --    CO2  --   --  25  --   --    BUN  --   --  15  --   --    CR 0.84 0.78 0.86   < > 0.67*   ANIONGAP  --   --  10  --   --    MARY  --   --  8.9  --   --    GLC  --   --  88  --  95    < > = values in this interval not displayed.     Most Recent Cholesterol Panel:  Recent Labs   Lab Test 11/14/19  1305   CHOL 184   LDL 90   HDL 79   TRIG 77     Most Recent TSH, T4 and A1c Labs:  Recent Labs   Lab Test 11/14/19  1305   A1C 5.2     Results for orders placed or performed during the hospital encounter of 06/14/19   CTA Abdomen Pelvis with Contrast    Narrative    CTA ABDOMEN AND PELVIS WITH CONTRAST   6/14/2019 9:24 PM     HISTORY: History of bilateral kissing common iliac stents on  11/16/2018. Patient continuing to experience claudication symptoms.  PAD (peripheral artery disease) (H).    TECHNIQUE:  80 mL [id number]. Arterial phase CT images of the chest,  abdomen, and pelvis after nonionic intravenous contrast. Radiation  dose for this scan was reduced using automated exposure control,  adjustment of the mA and/or kV according to patient size, or iterative  reconstruction technique. 3-D reformatted images and multiplanar  reconstructions were created on a separate workstation.    COMPARISON: 11/16/2018    FINDINGS: Thoracic aorta is patent and nonaneurysmal. There is  three-vessel anatomy of the aortic arch with only mild stenosis of the  proximal left subclavian artery. The remainder of the proximal great  vessels are patent. There is mild nonaneurysmal aortic  atherosclerosis. Less than 50% stenosis at the origins of the celiac  axis and left main renal artery. Superior mesenteric artery and right  renal artery are patent. High-grade stenosis at the origin of the  inferior mesenteric artery.    There are kissing bilateral common iliac artery stents that are  patent. Immediately distal to the right common iliac artery stent, at  the iliac bifurcation, there is high-grade stenosis of the proximal  right external iliac artery. The distal external iliac artery on the  right is stented and patent. There is approximately 50% stenosis of  the right common femoral artery related to calcific plaque. On the  left, there is less than 50% stenosis of the distal common iliac  artery. The external iliac artery is stented and patent. There is  greater than 50% stenosis of the left common femoral artery due to  calcific plaque.    Chest: The lungs are mildly emphysematous. No pulmonary nodule or  mass. No pleural or pericardial effusion. Thyroid gland is  unremarkable. No thoracic or axillary adenopathy. There is mild to  moderate coronary atherosclerosis.    Abdomen and pelvis: The liver, gallbladder, spleen, pancreas, adrenal  glands, and kidneys demonstrate no worrisome focal abnormalities. No  abdominal or  retroperitoneal adenopathy. No bowel obstruction, free  air, or ascites. No pelvic adenopathy, free fluid, or mass.    There has been prior posterior lumbosacral fusion.      Impression    IMPRESSION:  1. Bilateral common iliac and external iliac artery stents are patent.  There is high-grade stenosis of the right common iliac artery  bifurcation. There is less than 50% stenosis of the distal common  iliac artery on the left. There are bilateral common femoral artery  stenoses related to dense calcific plaque. Stenosis also noted at the  origin of the celiac axis and the left renal artery.  2. Mild pulmonary emphysema.    MINERVA SCOTT MD

## 2019-11-15 NOTE — PROGRESS NOTES
"Patient had bleed in left brachial.  Manual pressure held and homeostasis attained.  During this time patient demonstrated sweating and stated he felt, \"crappy'.  Patient was seen to have low blood pressures.  These resolved rapidly and patient stated he felt better.  Patient transferred to station 33 for compassionate overnight care.  Bedside report taken by receiving RN.  Patient has received discharge instructions and temporary stent card and new prescription.  "

## 2019-11-15 NOTE — PROCEDURES
[facility]    Procedure: BL LE angiogram  Date/Time: 11/14/2019 6:05 PM  Performed by: Bert Abarca MD  Authorized by: Bert Abarca MD     UNIVERSAL PROTOCOL   Site Marked: Yes  Prior Images Obtained and Reviewed:  Yes  Required items: Required blood products, implants, devices and special equipment available    Patient identity confirmed:  Verbally with patient  Patient was reevaluated immediately before administering moderate or deep sedation or anesthesia  Confirmation Checklist:  Patient's identity using two indicators, relevant allergies, procedure was appropriate and matched the consent or emergent situation and correct equipment/implants were available  Time out: Immediately prior to the procedure a time out was called    Preparation: Patient was prepped and draped in usual sterile fashion       ANESTHESIA    Anesthesia: Local infiltration  Local Anesthetic:  Lidocaine 1% without epinephrine      SEDATION    Patient Sedated: Yes    Sedation Type:  Moderate (conscious) sedation  Sedation:  See MAR for details  Vital signs: Vital signs monitored during sedation    See dictated procedure note for full details.  Findings: Bilateral SES spanning common to external iliac arteries.   Right popliteal angiogplasty  Right SFA angioplasty  Left SFA angioplasty.    PROCEDURE   Patient Tolerance:  Patient tolerated the procedure well with no immediate complications    Length of time physician/provider present for 1:1 monitoring during sedation: 60

## 2019-11-15 NOTE — PROGRESS NOTES
Patient handoff to Raphael SIMPSON and report given.  Patient has new RX Zetia, stent cards, and AVS- no further questions.   Continue to monitor

## 2019-11-15 NOTE — H&P
[facility]    History and Physical  Hospitalist       Date of Admission:  11/14/2019    Assessment & Plan     Grey Candelario is a 54 year old male smoker with a past medical history of hypertension, hyperlipidemia, and PAD s/p BL LE angiogram today thought brachial art plan was sent home however postop complicated with brachial artery bleeding and he was kept overnight for monitoring  Postop complication with a brachial artery bleeding.    1.  Postop complication with brachial artery bleeding after angiogram currently no active bleeding pulse intact radial and ulnar artery continue monitoring and will check CBC in a.m.    2.. PAD s/p bilateral external iliac artery stenting :   Cont statin   He is unable to tolerate aspirin due to stomach irritation, but has been on Plavix without problem in the past (although is no longer on this).   Resume plavix in am   Counseled about smoking      3. HTN: resume home med      DVT Prophylaxis: Low Risk/Ambulatory with no VTE prophylaxis indicated  Code Status: Full Code        Gary Jane MD    Primary Care Physician   *Santosh Ortega    Chief Complaint   Post op bleed     History is obtained from the patient    History of Present Illness     Grey Candelario is a 54 year old male smoker with a past medical history of hypertension, hyperlipidemia, and PAD s/p BL LE angiogram today thought brachial art plan was sent home however postop complicated with brachial artery bleeding and he was kept overnight for monitoring 1.  Postop complication with a brachial artery bleeding.  Patient reports he was supposed to go home to use the bathroom before he was getting ready to go home and he noticed significant bleeding from the left brachial artery where he had the angiogram and stenting done today bleeding was controlled with pressure no more bleeding pulse intact bilaterally he denies any pain he denies any chest pain shortness of breath nausea vomiting he  denies any other symptoms.  Patient admitted for observation overnight and possibly discharge in the morning after IR will check him we will check CBC make sure no significant blood drop.    Past Medical History    Past Medical History:   Diagnosis Date     Current smoker      HTN (hypertension)      Hx of heart artery stent     2018 2 stents placed     Hyperlipidemia        Past Surgical History   Past Surgical History     Prior to Admission Medications   Prior to Admission Medications   Prescriptions Last Dose Informant Patient Reported? Taking?   buPROPion (ZYBAN) 150 MG 12 hr tablet More than a month at Unknown time  No No   Sig: Take 1 tablet (150 mg) by mouth 2 times daily   Patient not taking: Reported on 10/31/2019   ibuprofen (ADVIL/MOTRIN) 400 MG tablet Past Week at Unknown time  Yes Yes   Sig: Take 400 mg by mouth every 6 hours as needed for moderate pain   lisinopril-hydrochlorothiazide (PRINZIDE/ZESTORETIC) 10-12.5 MG per tablet 11/14/2019 at Unknown time  Yes Yes   nicotine (NICODERM CQ) 21 MG/24HR 24 hr patch   No No   Sig: Place 1 patch onto the skin daily   Patient not taking: Reported on 10/31/2019   rosuvastatin (CRESTOR) 40 MG tablet 11/14/2019 at Unknown time  No Yes   Sig: TAKE 1 TABLET BY MOUTH ONE TIME DAILY      Facility-Administered Medications: None     Allergies   Allergies   Allergen Reactions     Morphine Hcl Hives       Social History   Grey Candelario  reports that he has been smoking. He has been smoking about 1.00 pack per day. He has never used smokeless tobacco. He reports current alcohol use. He reports that he does not use drugs.    Family History   Grey Candelario family history is not on file.    Review of Systems   The 10 point Review of Systems is negative other than noted in the HPI or here.     Physical Exam   Temp: 97.8  F (36.6  C) Temp src: Oral BP: (!) 141/67 Pulse: 76 Heart Rate: 75 Resp: 16 SpO2: 96 % O2 Device: None (Room air) Oxygen Delivery: 2 LPM  Vital Signs  with Ranges  Temp:  [97.8  F (36.6  C)-98.4  F (36.9  C)] 97.8  F (36.6  C)  Pulse:  [73-96] 76  Heart Rate:  [75-87] 75  Resp:  [11-21] 16  BP: ()/(45-85) 141/67  SpO2:  [93 %-100 %] 96 %  171 lbs 9.6 oz    Constitutional: Awake, alert, cooperative, no apparent distress.  Eyes: Conjunctiva and pupils examined and normal.  HEENT: Moist mucous membranes, normal dentition.  Respiratory: Clear to auscultation bilaterally, no crackles or wheezing.  Cardiovascular: ,normal S1 and S2, and no murmur noted.  GI: Soft, non-distended, non-tender, normal bowel sounds.  Musculoskeletal: CELESTE no swelling pulse Intact radial and ulner/ no edema    Neurologic: AXOX3       Data   Data reviewed today:  I personally reviewed no images or EKG's today.  Recent Labs   Lab 11/14/19  1305   WBC 8.7   HGB 14.0   MCV 97      INR 0.94   CR 0.84       Imaging:  No results found for this or any previous visit (from the past 24 hour(s)).

## 2019-11-15 NOTE — PLAN OF CARE
Alert and oriented x4. Vital signs stable on room air. Up independently. Tolerating regular diet. Lung sounds clear. Bowel sounds active, + flatus, BM yesterday. Adequate urine output. Dressing on LUE CDI, ecchymotic. CMS intact. All pulses palpable. Denies pain and nausea. Discharge instructions complete, questions answered, and medications reviewed. Discharging to home.

## 2019-11-15 NOTE — PLAN OF CARE
A/Ox4. AVSS ex HTN. L brachial angio site CDI with mild edema. Nicotine patch in place. Pt requesting to be discharged by 0730 as that is when his ride will be available. Explained to pt that is when shift change happens and it is unlikely we will get him out by 0730, but will do our best to get him discharged as soon as we can.

## 2019-11-18 ENCOUNTER — TELEPHONE (OUTPATIENT)
Dept: OTHER | Facility: CLINIC | Age: 54
End: 2019-11-18

## 2019-11-18 NOTE — TELEPHONE ENCOUNTER
Patient returned my phone call.  Doing fine.  Left arm is sore and bruised but no swelling and is soft to the touch. Motor and sensation is intact.   Is up walking with no reports of pain in his legs.   No fever.  Will follow up with Dr. Castillo in 1 month.  Marquita Wolff RN  IR nurse clinician  [phone]

## 2019-11-19 ENCOUNTER — TELEPHONE (OUTPATIENT)
Dept: OTHER | Facility: CLINIC | Age: 54
End: 2019-11-19

## 2019-11-19 DIAGNOSIS — I73.9 PAD (PERIPHERAL ARTERY DISEASE) (H): Primary | ICD-10-CM

## 2019-11-19 NOTE — TELEPHONE ENCOUNTER
Pt is s/p bilateral common and external iliac stenting, right SFA and right popliteal angioplasty, and left SFA angioplasty on 11/14/19.  Per Dr. Castillo, pt needs 1 month follow up with ADRIAN with exercise.  Will route to scheduling to contact pt to coordinate.  Belinda Mcelroy, DEANDREN, RN  [facility]

## 2019-11-29 NOTE — TELEPHONE ENCOUNTER
Patient is scheduled for 1 month follow-up and ADRIAN on 12/19/2019.      Fanta Roberts  Appointment Scheduling     [facility]  Office: [phone]  Fax: [phone]  Direct: [phone]

## 2019-12-05 ENCOUNTER — TELEPHONE (OUTPATIENT)
Dept: OTHER | Facility: CLINIC | Age: 54
End: 2019-12-05

## 2019-12-05 NOTE — TELEPHONE ENCOUNTER
Pt is scheduled to see Dr. Castillo on 12/19/19 with ADRIAN w/ exercise prior.  DEANDRE RodriguesN, RN  [facility] [place]

## 2019-12-05 NOTE — TELEPHONE ENCOUNTER
[place] [facility]    I called [employer] today about his recent repeat angiogram with reangioplasty of both iliac stents in the right mid SFA and popliteal artery.  Reports that he is feeling better.  The buttock discomfort that he noted pre-angiogram intervention has almost completely resolved.      He is scheduled to see me in the office in several weeks.  ADRIAN with exercise will be performed at that time.      Giorgio Castillo MD

## 2019-12-18 NOTE — PROGRESS NOTES
[place] [facility]    Grey Candelario returns for vascular follow-up.  He had ongoing claudication symptoms in both of his legs despite prior intervention.  He underwent a repeat aortogram with bilateral runoffs by Dr. Abarca on 11/14/2019.  Bare-metal stenting was performed and external iliac artery with a similar procedure on the left.  Angioplasty of the right SFA and popliteal stenosis was performed along with the left superficial femoral artery angioplasty.  Approach was via the brachial artery with no complications.  Three-vessel runoff was noted bilaterally.  Also moderate right greater than left common femoral artery focal stenosis.      Since the angiogram he has improved.  He does noticed bilateral buttock claudication if he walks further but the calf claudication is better.  He still continues to smoke but is thinking more more about quitting which we obviously have encouraged frequently.  His claudication symptoms are definitely less disabling than previously.    PMH: Medications: Lisinopril/HCTZ, Crestor, Zetia, Plavix            Ongoing smoker            Works full-time.    Exam: Alert and appropriate.  Very comfortable.  Normal affect.              Blood pressure 146/78.  Pulse 92              Extremities= no edema, normal sensation.                 +3 palpable dorsalis pedis and posterior tibial pulses bilaterally.    ADRIAN with exercise was performed today.  Resting ABIs better with the right being 0.88 in the left 0.95 with triphasic waveforms in all arteries.  However the exercise both still decreased with the right going to 0.44 on the left 0.42.  Complaining more of the buttock discomfort then calf discomfort with exercise.      Reviewed the angiographic images with the patient today.  Extensive stenting of the common and external iliac arteries bilaterally.  Stenosis at the origin of both internal arteries which are otherwise patent.  The stenosis in the right greater than left  common femoral artery was shown to him along with the angioplasties of the SFA with fairly good results and three-vessel runoff.    Impression: Overall patient is doing better.  Still some issues.  We certainly can explain some of the blood symptoms due to the internal iliac artery stenosis.  I did explain why the ABIs with exercise decreased so much with the angiographic findings and good distal pulses.  With his improved symptoms no interventions indicated.  He is going to try to exercise in a much regular basis to build up his collaterals and exercise tolerance.  Again strongly encouraged to quit smoking.                    I will see him again in 9 months for follow-up ADRIAN with exercise and also duplex evaluation of his iliac artery stents and SFA angioplasty sites.       Giorgio Castillo MD

## 2019-12-19 ENCOUNTER — HOSPITAL ENCOUNTER (OUTPATIENT)
Dept: ULTRASOUND IMAGING | Facility: CLINIC | Age: 54
Discharge: HOME | End: 2019-12-19
Attending: SURGERY | Admitting: SURGERY
Payer: COMMERCIAL

## 2019-12-19 ENCOUNTER — OFFICE VISIT (OUTPATIENT)
Dept: OTHER | Facility: CLINIC | Age: 54
End: 2019-12-19
Attending: SURGERY
Payer: COMMERCIAL

## 2019-12-19 VITALS — HEART RATE: 92 BPM | DIASTOLIC BLOOD PRESSURE: 78 MMHG | SYSTOLIC BLOOD PRESSURE: 146 MMHG

## 2019-12-19 DIAGNOSIS — I73.9 PAD (PERIPHERAL ARTERY DISEASE) (H): ICD-10-CM

## 2019-12-19 DIAGNOSIS — I73.9 PAD (PERIPHERAL ARTERY DISEASE) (H): Primary | ICD-10-CM

## 2019-12-19 PROCEDURE — 99213 OFFICE O/P EST LOW 20 MIN: CPT | Mod: ZP | Performed by: SURGERY

## 2019-12-19 PROCEDURE — 93924 LWR XTR VASC STDY BILAT: CPT

## 2019-12-19 PROCEDURE — G0463 HOSPITAL OUTPT CLINIC VISIT: HCPCS | Mod: 25

## 2019-12-19 NOTE — NURSING NOTE
"Grey Candelario is a 54 year old male who presents for:  Chief Complaint   Patient presents with     RECHECK     Bilat ADRIAN w Exerc (2:00 VHC, 2:45 WRO) History of bilateral common and external iliac stenting, R SFA & R popliteal angioplasty & L SFA angioplasty on 11/14/19; 1 mo f/u to 11/14/19 angioplasty; pt to see Dr. Castillo.         Vitals:    Vitals:    12/19/19 1421   BP: (!) 146/78   BP Location: Right arm   Patient Position: Chair   Cuff Size: Adult Regular   Pulse: 92       BMI:  Estimated body mass index is 24.62 kg/m  as calculated from the following:    Height as of 11/14/19: 5' 10\" (1.778 m).    Weight as of 11/14/19: 171 lb 9.6 oz (77.8 kg).    Pain Score:  Data Unavailable        Barbara Carter MA    "

## 2020-01-01 NOTE — PROGRESS NOTES
Care Suites Arrival    Patient arrived ambulatory to [facility]. Patient is alert, oriented, cooperative. Denies pain. PIV started in left arm. Labs drawn. IV fluids infusing. Patient denies skin issues. Contrast D/C instructions reviewed with pt with verbal understanding received. All questions & concerns addressed.  here to explain procedure to patient and obtain consent.   1330- Pt up to void then taken ambulatory to cath lab with RN         1450-Pt returned from IR. Gauze drsg CDI to bilateral groin puncture sites. No oozing or hematoma noted. Area soft & flat. Pt denies pain. Pt instructed on activity restrictions while on bedrest. Pt taking fluids well. No complaints.    6515- Dr Carlos here to discuss treatment plan with patient. Patient appears to understand and states he has Nicotine patches at home and does not require a new Rx   Dr. Currie

## 2020-10-06 ENCOUNTER — TELEPHONE (OUTPATIENT)
Dept: OTHER | Facility: CLINIC | Age: 55
End: 2020-10-06

## 2020-10-06 NOTE — TELEPHONE ENCOUNTER
2 attempts have been made to schedule 6 month follow up with Dr. Castillo. No more attempts will be made.     Attempts:  1st LS 7/7/2020  2nd LS 8/21/2020    Claudine VALERA

## 2020-11-20 NOTE — TELEPHONE ENCOUNTER
rRx refill request for: suvastatin (CRESTOR) 40 MG tablet received via fax.    Last Written Prescription Date:  10/28/19  Last Fill Quantity: 90,  # refills: 3     Last office visit: 12/19/2019     Prescription written as part of a hospital consult.  No follow up with Vascular Medicine.    Rx denied via return fax to Noman Knutson.    Benita Wall RN BSN  [facility]  [phone]

## 2022-10-04 ENCOUNTER — TRANSFERRED RECORDS (OUTPATIENT)
Dept: HEALTH INFORMATION MANAGEMENT | Facility: CLINIC | Age: 57
End: 2022-10-04

## 2022-10-04 LAB
ALT SERPL-CCNC: 20 U/L (ref 9–46)
AST SERPL-CCNC: 27 U/L (ref 10–35)
CHOLESTEROL (EXTERNAL): 190 MG/DL
CREATININE (EXTERNAL): 2.88 MG/DL (ref 0.7–1.3)
GFR ESTIMATED (EXTERNAL): 24 ML/MIN/1.73M2
GLUCOSE (EXTERNAL): 98 MG/DL (ref 65–99)
HDLC SERPL-MCNC: 82 MG/DL
LDL CHOLESTEROL CALCULATED (EXTERNAL): 89 MG/DL
NON HDL CHOLESTEROL (EXTERNAL): 108 MG/DL
POTASSIUM (EXTERNAL): 4.7 MMOL/L (ref 3.5–5.3)
TRIGLYCERIDES (EXTERNAL): 98 MG/DL
TSH SERPL-ACNC: 1.06 MIU/L (ref 0.4–4.5)

## 2022-10-10 ENCOUNTER — MEDICAL CORRESPONDENCE (OUTPATIENT)
Dept: HEALTH INFORMATION MANAGEMENT | Facility: CLINIC | Age: 57
End: 2022-10-10

## 2022-10-27 ENCOUNTER — TELEPHONE (OUTPATIENT)
Dept: UROLOGY | Facility: CLINIC | Age: 57
End: 2022-10-27

## 2022-10-27 NOTE — TELEPHONE ENCOUNTER
Spoke to pt to schedule Urology appointment for hematuria per referral from [facility].  Pt just had back surgery and he will call us when he is ready to schedule.

## 2023-04-20 ENCOUNTER — TRANSFERRED RECORDS (OUTPATIENT)
Dept: HEALTH INFORMATION MANAGEMENT | Facility: CLINIC | Age: 58
End: 2023-04-20
Payer: COMMERCIAL

## 2023-04-20 ENCOUNTER — MEDICAL CORRESPONDENCE (OUTPATIENT)
Dept: HEALTH INFORMATION MANAGEMENT | Facility: CLINIC | Age: 58
End: 2023-04-20
Payer: COMMERCIAL

## 2023-04-20 LAB
CHOLESTEROL (EXTERNAL): 146 MG/DL
CREATININE (EXTERNAL): 3.61 MG/DL (ref 0.7–1.3)
GFR ESTIMATED (EXTERNAL): 19 ML/MIN/1.73M2
GFR ESTIMATED (IF AFRICAN AMERICAN) (EXTERNAL): ABNORMAL ML/MIN/1.73M2
GLUCOSE (EXTERNAL): 88 MG/DL (ref 65–99)
HDLC SERPL-MCNC: 59 MG/DL
LDL CHOLESTEROL CALCULATED (EXTERNAL): 63 MG/DL
NON HDL CHOLESTEROL (EXTERNAL): 87 MG/DL
POTASSIUM (EXTERNAL): 4.8 MMOL/L (ref 3.5–5.3)
TRIGLYCERIDES (EXTERNAL): 159 MG/DL

## 2023-04-24 ENCOUNTER — TRANSFERRED RECORDS (OUTPATIENT)
Dept: HEALTH INFORMATION MANAGEMENT | Facility: CLINIC | Age: 58
End: 2023-04-24
Payer: COMMERCIAL

## 2023-04-25 ENCOUNTER — APPOINTMENT (OUTPATIENT)
Dept: CT IMAGING | Facility: CLINIC | Age: 58
DRG: 812 | End: 2023-04-25
Attending: EMERGENCY MEDICINE
Payer: COMMERCIAL

## 2023-04-25 ENCOUNTER — HOSPITAL ENCOUNTER (INPATIENT)
Facility: CLINIC | Age: 58
LOS: 3 days | Discharge: HOME | DRG: 812 | End: 2023-04-28
Attending: EMERGENCY MEDICINE | Admitting: INTERNAL MEDICINE
Payer: COMMERCIAL

## 2023-04-25 DIAGNOSIS — N17.9 AKI (ACUTE KIDNEY INJURY) (H): ICD-10-CM

## 2023-04-25 DIAGNOSIS — R01.1 SYSTOLIC MURMUR: ICD-10-CM

## 2023-04-25 DIAGNOSIS — D64.9 ANEMIA, UNSPECIFIED TYPE: ICD-10-CM

## 2023-04-25 DIAGNOSIS — R11.0 NAUSEA: ICD-10-CM

## 2023-04-25 DIAGNOSIS — I35.0 SEVERE AORTIC VALVE STENOSIS: Primary | ICD-10-CM

## 2023-04-25 LAB
ALBUMIN MFR UR ELPH: 115.2 MG/DL (ref 1–14)
ALBUMIN SERPL BCG-MCNC: 4.4 G/DL (ref 3.5–5.2)
ALBUMIN UR-MCNC: 100 MG/DL
ALP SERPL-CCNC: 89 U/L (ref 40–129)
ALT SERPL W P-5'-P-CCNC: 29 U/L (ref 10–50)
ANION GAP SERPL CALCULATED.3IONS-SCNC: 14 MMOL/L (ref 7–15)
APPEARANCE UR: CLEAR
AST SERPL W P-5'-P-CCNC: 34 U/L (ref 10–50)
ATRIAL RATE - MUSE: 81 BPM
BASOPHILS # BLD AUTO: 0 10E3/UL (ref 0–0.2)
BASOPHILS NFR BLD AUTO: 1 %
BILIRUB SERPL-MCNC: 0.4 MG/DL
BILIRUB UR QL STRIP: NEGATIVE
BUN SERPL-MCNC: 39.3 MG/DL (ref 8–23)
CALCIUM SERPL-MCNC: 9.9 MG/DL (ref 8.6–10)
CHLORIDE SERPL-SCNC: 98 MMOL/L (ref 98–107)
COLOR UR AUTO: ABNORMAL
CREAT SERPL-MCNC: 3.61 MG/DL (ref 0.67–1.17)
CREAT UR-MCNC: 81.3 MG/DL
CRP SERPL-MCNC: 5.09 MG/L
DEPRECATED HCO3 PLAS-SCNC: 23 MMOL/L (ref 22–29)
DIASTOLIC BLOOD PRESSURE - MUSE: NORMAL MMHG
EOSINOPHIL # BLD AUTO: 0.2 10E3/UL (ref 0–0.7)
EOSINOPHIL NFR BLD AUTO: 3 %
ERYTHROCYTE [DISTWIDTH] IN BLOOD BY AUTOMATED COUNT: 13.2 % (ref 10–15)
GFR SERPL CREATININE-BSD FRML MDRD: 19 ML/MIN/1.73M2
GLUCOSE BLDC GLUCOMTR-MCNC: 83 MG/DL (ref 70–99)
GLUCOSE SERPL-MCNC: 109 MG/DL (ref 70–99)
GLUCOSE UR STRIP-MCNC: NEGATIVE MG/DL
HCT VFR BLD AUTO: 25.7 % (ref 40–53)
HGB BLD-MCNC: 8.5 G/DL (ref 13.3–17.7)
HGB UR QL STRIP: ABNORMAL
HYALINE CASTS: 1 /LPF
IMM GRANULOCYTES # BLD: 0 10E3/UL
IMM GRANULOCYTES NFR BLD: 0 %
INTERPRETATION ECG - MUSE: NORMAL
KETONES UR STRIP-MCNC: NEGATIVE MG/DL
LEUKOCYTE ESTERASE UR QL STRIP: NEGATIVE
LIPASE SERPL-CCNC: 81 U/L (ref 13–60)
LYMPHOCYTES # BLD AUTO: 1.8 10E3/UL (ref 0.8–5.3)
LYMPHOCYTES NFR BLD AUTO: 27 %
MAGNESIUM SERPL-MCNC: 2 MG/DL (ref 1.7–2.3)
MCH RBC QN AUTO: 32.7 PG (ref 26.5–33)
MCHC RBC AUTO-ENTMCNC: 33.1 G/DL (ref 31.5–36.5)
MCV RBC AUTO: 99 FL (ref 78–100)
MONOCYTES # BLD AUTO: 0.7 10E3/UL (ref 0–1.3)
MONOCYTES NFR BLD AUTO: 10 %
MUCOUS THREADS #/AREA URNS LPF: PRESENT /LPF
NEUTROPHILS # BLD AUTO: 4 10E3/UL (ref 1.6–8.3)
NEUTROPHILS NFR BLD AUTO: 59 %
NITRATE UR QL: NEGATIVE
NRBC # BLD AUTO: 0 10E3/UL
NRBC BLD AUTO-RTO: 0 /100
P AXIS - MUSE: 59 DEGREES
PH UR STRIP: 7.5 [PH] (ref 5–7)
PHOSPHATE SERPL-MCNC: 4.4 MG/DL (ref 2.5–4.5)
PLATELET # BLD AUTO: 201 10E3/UL (ref 150–450)
POTASSIUM SERPL-SCNC: 4.4 MMOL/L (ref 3.4–5.3)
PR INTERVAL - MUSE: 154 MS
PROT SERPL-MCNC: 8.2 G/DL (ref 6.4–8.3)
PROT/CREAT 24H UR: 1.42 MG/MG CR (ref 0–0.2)
QRS DURATION - MUSE: 80 MS
QT - MUSE: 384 MS
QTC - MUSE: 446 MS
R AXIS - MUSE: 66 DEGREES
RBC # BLD AUTO: 2.6 10E6/UL (ref 4.4–5.9)
RBC URINE: 1 /HPF
SODIUM SERPL-SCNC: 135 MMOL/L (ref 136–145)
SP GR UR STRIP: 1.01 (ref 1–1.03)
SYSTOLIC BLOOD PRESSURE - MUSE: NORMAL MMHG
T AXIS - MUSE: 57 DEGREES
UROBILINOGEN UR STRIP-MCNC: NORMAL MG/DL
VENTRICULAR RATE- MUSE: 81 BPM
WBC # BLD AUTO: 6.7 10E3/UL (ref 4–11)
WBC URINE: 3 /HPF

## 2023-04-25 PROCEDURE — 93005 ELECTROCARDIOGRAM TRACING: CPT

## 2023-04-25 PROCEDURE — 120N000001 HC R&B MED SURG/OB

## 2023-04-25 PROCEDURE — 83690 ASSAY OF LIPASE: CPT | Performed by: EMERGENCY MEDICINE

## 2023-04-25 PROCEDURE — 81003 URINALYSIS AUTO W/O SCOPE: CPT | Performed by: EMERGENCY MEDICINE

## 2023-04-25 PROCEDURE — 99418 PROLNG IP/OBS E/M EA 15 MIN: CPT | Performed by: INTERNAL MEDICINE

## 2023-04-25 PROCEDURE — 84156 ASSAY OF PROTEIN URINE: CPT | Performed by: INTERNAL MEDICINE

## 2023-04-25 PROCEDURE — HZ2ZZZZ DETOXIFICATION SERVICES FOR SUBSTANCE ABUSE TREATMENT: ICD-10-PCS | Performed by: INTERNAL MEDICINE

## 2023-04-25 PROCEDURE — 99223 1ST HOSP IP/OBS HIGH 75: CPT | Performed by: INTERNAL MEDICINE

## 2023-04-25 PROCEDURE — 84100 ASSAY OF PHOSPHORUS: CPT | Performed by: INTERNAL MEDICINE

## 2023-04-25 PROCEDURE — 258N000003 HC RX IP 258 OP 636: Performed by: EMERGENCY MEDICINE

## 2023-04-25 PROCEDURE — 86140 C-REACTIVE PROTEIN: CPT | Performed by: INTERNAL MEDICINE

## 2023-04-25 PROCEDURE — 83735 ASSAY OF MAGNESIUM: CPT | Performed by: INTERNAL MEDICINE

## 2023-04-25 PROCEDURE — 99285 EMERGENCY DEPT VISIT HI MDM: CPT | Mod: 25

## 2023-04-25 PROCEDURE — 96360 HYDRATION IV INFUSION INIT: CPT

## 2023-04-25 PROCEDURE — 96361 HYDRATE IV INFUSION ADD-ON: CPT

## 2023-04-25 PROCEDURE — 82962 GLUCOSE BLOOD TEST: CPT

## 2023-04-25 PROCEDURE — 36415 COLL VENOUS BLD VENIPUNCTURE: CPT | Performed by: EMERGENCY MEDICINE

## 2023-04-25 PROCEDURE — 80053 COMPREHEN METABOLIC PANEL: CPT | Performed by: EMERGENCY MEDICINE

## 2023-04-25 PROCEDURE — 74176 CT ABD & PELVIS W/O CONTRAST: CPT

## 2023-04-25 PROCEDURE — 85004 AUTOMATED DIFF WBC COUNT: CPT | Performed by: EMERGENCY MEDICINE

## 2023-04-25 RX ORDER — LORAZEPAM 2 MG/ML
1-2 INJECTION INTRAMUSCULAR EVERY 30 MIN PRN
Status: DISCONTINUED | OUTPATIENT
Start: 2023-04-25 | End: 2023-04-28 | Stop reason: HOSPADM

## 2023-04-25 RX ORDER — LORAZEPAM 1 MG/1
1-2 TABLET ORAL EVERY 30 MIN PRN
Status: DISCONTINUED | OUTPATIENT
Start: 2023-04-25 | End: 2023-04-28 | Stop reason: HOSPADM

## 2023-04-25 RX ORDER — SODIUM CHLORIDE 9 MG/ML
INJECTION, SOLUTION INTRAVENOUS CONTINUOUS
Status: DISCONTINUED | OUTPATIENT
Start: 2023-04-25 | End: 2023-04-28 | Stop reason: HOSPADM

## 2023-04-25 RX ORDER — HALOPERIDOL 5 MG/ML
2.5-5 INJECTION INTRAMUSCULAR EVERY 6 HOURS PRN
Status: DISCONTINUED | OUTPATIENT
Start: 2023-04-25 | End: 2023-04-28 | Stop reason: HOSPADM

## 2023-04-25 RX ORDER — MULTIPLE VITAMINS W/ MINERALS TAB 9MG-400MCG
1 TAB ORAL DAILY
Status: DISCONTINUED | OUTPATIENT
Start: 2023-04-26 | End: 2023-04-28 | Stop reason: HOSPADM

## 2023-04-25 RX ORDER — FOLIC ACID 1 MG/1
1 TABLET ORAL DAILY
Status: DISCONTINUED | OUTPATIENT
Start: 2023-04-26 | End: 2023-04-28 | Stop reason: HOSPADM

## 2023-04-25 RX ORDER — LORAZEPAM 2 MG/ML
1 INJECTION INTRAMUSCULAR ONCE
Status: DISCONTINUED | OUTPATIENT
Start: 2023-04-25 | End: 2023-04-25

## 2023-04-25 RX ORDER — OLANZAPINE 5 MG/1
5-10 TABLET, ORALLY DISINTEGRATING ORAL EVERY 6 HOURS PRN
Status: DISCONTINUED | OUTPATIENT
Start: 2023-04-25 | End: 2023-04-28 | Stop reason: HOSPADM

## 2023-04-25 RX ORDER — LOSARTAN POTASSIUM AND HYDROCHLOROTHIAZIDE 25; 100 MG/1; MG/1
1 TABLET ORAL DAILY
Status: ON HOLD | COMMUNITY
End: 2023-04-28

## 2023-04-25 RX ORDER — FLUMAZENIL 0.1 MG/ML
0.2 INJECTION, SOLUTION INTRAVENOUS
Status: DISCONTINUED | OUTPATIENT
Start: 2023-04-25 | End: 2023-04-28 | Stop reason: HOSPADM

## 2023-04-25 RX ADMIN — SODIUM CHLORIDE: 9 INJECTION, SOLUTION INTRAVENOUS at 22:00

## 2023-04-25 RX ADMIN — SODIUM CHLORIDE 1000 ML: 9 INJECTION, SOLUTION INTRAVENOUS at 15:22

## 2023-04-25 ASSESSMENT — ENCOUNTER SYMPTOMS
COLOR CHANGE: 1
APPETITE CHANGE: 1
TREMORS: 1
SLEEP DISTURBANCE: 1
SHORTNESS OF BREATH: 1
UNEXPECTED WEIGHT CHANGE: 1
NAUSEA: 1
LIGHT-HEADEDNESS: 1
ABDOMINAL DISTENTION: 1

## 2023-04-25 ASSESSMENT — ACTIVITIES OF DAILY LIVING (ADL)
ADLS_ACUITY_SCORE: 35
ADLS_ACUITY_SCORE: 35

## 2023-04-26 ENCOUNTER — APPOINTMENT (OUTPATIENT)
Dept: CARDIOLOGY | Facility: CLINIC | Age: 58
DRG: 812 | End: 2023-04-26
Attending: INTERNAL MEDICINE
Payer: COMMERCIAL

## 2023-04-26 ENCOUNTER — TRANSCRIBE ORDERS (OUTPATIENT)
Dept: OTHER | Age: 58
End: 2023-04-26

## 2023-04-26 ENCOUNTER — TELEPHONE (OUTPATIENT)
Dept: CARDIOLOGY | Facility: CLINIC | Age: 58
End: 2023-04-26
Payer: COMMERCIAL

## 2023-04-26 DIAGNOSIS — G43.909 HEADACHE, MIGRAINE: Primary | ICD-10-CM

## 2023-04-26 DIAGNOSIS — D64.9 ANEMIA: Primary | ICD-10-CM

## 2023-04-26 DIAGNOSIS — R14.0 ABDOMINAL BLOATING: ICD-10-CM

## 2023-04-26 DIAGNOSIS — R70.0 ELEVATED ERYTHROCYTE SEDIMENTATION RATE: ICD-10-CM

## 2023-04-26 LAB
ANION GAP SERPL CALCULATED.3IONS-SCNC: 9 MMOL/L (ref 7–15)
APTT PPP: 30 SECONDS (ref 22–38)
BUN SERPL-MCNC: 36.2 MG/DL (ref 8–23)
CALCIUM SERPL-MCNC: 9 MG/DL (ref 8.6–10)
CHLORIDE SERPL-SCNC: 108 MMOL/L (ref 98–107)
CK SERPL-CCNC: 189 U/L (ref 39–308)
CLOSURE TME COLL+EPINEP BLD: 138 SECONDS
CREAT SERPL-MCNC: 3.42 MG/DL (ref 0.67–1.17)
CYSTATIN C (ROCHE): 2.6 MG/L (ref 0.6–1)
DEPRECATED HCO3 PLAS-SCNC: 22 MMOL/L (ref 22–29)
FOLATE SERPL-MCNC: 32.3 NG/ML (ref 4.6–34.8)
GFR SERPL CREATININE-BSD FRML MDRD: 20 ML/MIN/1.73M2
GFR SERPL CREATININE-BSD FRML MDRD: 22 ML/MIN/1.73M2
GLUCOSE SERPL-MCNC: 100 MG/DL (ref 70–99)
INR PPP: 1.06 (ref 0.85–1.15)
IRON BINDING CAPACITY (ROCHE): 203 UG/DL (ref 240–430)
IRON SATN MFR SERPL: 32 % (ref 15–46)
IRON SERPL-MCNC: 64 UG/DL (ref 61–157)
LVEF ECHO: NORMAL
MAGNESIUM SERPL-MCNC: 2 MG/DL (ref 1.7–2.3)
POTASSIUM SERPL-SCNC: 4.2 MMOL/L (ref 3.4–5.3)
SODIUM SERPL-SCNC: 139 MMOL/L (ref 136–145)
VIT B12 SERPL-MCNC: 638 PG/ML (ref 232–1245)

## 2023-04-26 PROCEDURE — 999N000208 ECHOCARDIOGRAM COMPLETE

## 2023-04-26 PROCEDURE — 36415 COLL VENOUS BLD VENIPUNCTURE: CPT | Performed by: INTERNAL MEDICINE

## 2023-04-26 PROCEDURE — 80048 BASIC METABOLIC PNL TOTAL CA: CPT | Performed by: INTERNAL MEDICINE

## 2023-04-26 PROCEDURE — 93306 TTE W/DOPPLER COMPLETE: CPT | Mod: 26 | Performed by: INTERNAL MEDICINE

## 2023-04-26 PROCEDURE — 120N000001 HC R&B MED SURG/OB

## 2023-04-26 PROCEDURE — 85610 PROTHROMBIN TIME: CPT | Performed by: INTERNAL MEDICINE

## 2023-04-26 PROCEDURE — 250N000013 HC RX MED GY IP 250 OP 250 PS 637: Performed by: INTERNAL MEDICINE

## 2023-04-26 PROCEDURE — 258N000003 HC RX IP 258 OP 636: Performed by: EMERGENCY MEDICINE

## 2023-04-26 PROCEDURE — 85730 THROMBOPLASTIN TIME PARTIAL: CPT | Performed by: INTERNAL MEDICINE

## 2023-04-26 PROCEDURE — 255N000002 HC RX 255 OP 636: Performed by: INTERNAL MEDICINE

## 2023-04-26 PROCEDURE — 85576 BLOOD PLATELET AGGREGATION: CPT

## 2023-04-26 PROCEDURE — 82550 ASSAY OF CK (CPK): CPT | Performed by: INTERNAL MEDICINE

## 2023-04-26 PROCEDURE — 99254 IP/OBS CNSLTJ NEW/EST MOD 60: CPT | Performed by: INTERNAL MEDICINE

## 2023-04-26 PROCEDURE — 82607 VITAMIN B-12: CPT | Performed by: INTERNAL MEDICINE

## 2023-04-26 PROCEDURE — 83735 ASSAY OF MAGNESIUM: CPT | Performed by: INTERNAL MEDICINE

## 2023-04-26 PROCEDURE — 82610 CYSTATIN C: CPT | Performed by: INTERNAL MEDICINE

## 2023-04-26 PROCEDURE — 82746 ASSAY OF FOLIC ACID SERUM: CPT | Performed by: INTERNAL MEDICINE

## 2023-04-26 PROCEDURE — 99233 SBSQ HOSP IP/OBS HIGH 50: CPT | Performed by: INTERNAL MEDICINE

## 2023-04-26 PROCEDURE — 258N000003 HC RX IP 258 OP 636: Performed by: INTERNAL MEDICINE

## 2023-04-26 PROCEDURE — 83550 IRON BINDING TEST: CPT | Performed by: INTERNAL MEDICINE

## 2023-04-26 RX ORDER — ONDANSETRON 4 MG/1
4 TABLET, ORALLY DISINTEGRATING ORAL EVERY 6 HOURS PRN
Status: DISCONTINUED | OUTPATIENT
Start: 2023-04-26 | End: 2023-04-28 | Stop reason: HOSPADM

## 2023-04-26 RX ORDER — AMOXICILLIN 250 MG
2 CAPSULE ORAL 2 TIMES DAILY PRN
Status: DISCONTINUED | OUTPATIENT
Start: 2023-04-26 | End: 2023-04-28 | Stop reason: HOSPADM

## 2023-04-26 RX ORDER — LIDOCAINE 40 MG/G
CREAM TOPICAL
Status: DISCONTINUED | OUTPATIENT
Start: 2023-04-26 | End: 2023-04-28 | Stop reason: HOSPADM

## 2023-04-26 RX ORDER — AMOXICILLIN 250 MG
1 CAPSULE ORAL 2 TIMES DAILY PRN
Status: DISCONTINUED | OUTPATIENT
Start: 2023-04-26 | End: 2023-04-28 | Stop reason: HOSPADM

## 2023-04-26 RX ORDER — ROSUVASTATIN CALCIUM 20 MG/1
40 TABLET, COATED ORAL DAILY
Status: DISCONTINUED | OUTPATIENT
Start: 2023-04-26 | End: 2023-04-27

## 2023-04-26 RX ORDER — ACETAMINOPHEN 325 MG/1
650 TABLET ORAL EVERY 6 HOURS PRN
Status: DISCONTINUED | OUTPATIENT
Start: 2023-04-26 | End: 2023-04-28 | Stop reason: HOSPADM

## 2023-04-26 RX ORDER — ACETAMINOPHEN 650 MG/1
650 SUPPOSITORY RECTAL EVERY 6 HOURS PRN
Status: DISCONTINUED | OUTPATIENT
Start: 2023-04-26 | End: 2023-04-28 | Stop reason: HOSPADM

## 2023-04-26 RX ORDER — ONDANSETRON 2 MG/ML
4 INJECTION INTRAMUSCULAR; INTRAVENOUS EVERY 6 HOURS PRN
Status: DISCONTINUED | OUTPATIENT
Start: 2023-04-26 | End: 2023-04-28 | Stop reason: HOSPADM

## 2023-04-26 RX ADMIN — MULTIPLE VITAMINS W/ MINERALS TAB 1 TABLET: TAB at 08:47

## 2023-04-26 RX ADMIN — LORAZEPAM 1 MG: 1 TABLET ORAL at 03:19

## 2023-04-26 RX ADMIN — HUMAN ALBUMIN MICROSPHERES AND PERFLUTREN 9 ML: 10; .22 INJECTION, SOLUTION INTRAVENOUS at 10:36

## 2023-04-26 RX ADMIN — THIAMINE HCL TAB 100 MG 100 MG: 100 TAB at 08:48

## 2023-04-26 RX ADMIN — ROSUVASTATIN CALCIUM 40 MG: 20 TABLET, FILM COATED ORAL at 08:48

## 2023-04-26 RX ADMIN — SODIUM CHLORIDE: 9 INJECTION, SOLUTION INTRAVENOUS at 16:33

## 2023-04-26 RX ADMIN — LORAZEPAM 1 MG: 1 TABLET ORAL at 11:13

## 2023-04-26 RX ADMIN — FOLIC ACID 1 MG: 1 TABLET ORAL at 08:48

## 2023-04-26 RX ADMIN — SODIUM CHLORIDE: 9 INJECTION, SOLUTION INTRAVENOUS at 07:05

## 2023-04-26 ASSESSMENT — ACTIVITIES OF DAILY LIVING (ADL)
ADLS_ACUITY_SCORE: 35
ADLS_ACUITY_SCORE: 35
FALL_HISTORY_WITHIN_LAST_SIX_MONTHS: NO
ADLS_ACUITY_SCORE: 35
ADLS_ACUITY_SCORE: 20

## 2023-04-26 NOTE — CONSULTS
[facility]    Nephrology Consultation     [facility]    Date of Admission:  4/25/2023    Assessment & Plan     <KIDNEY DISEASE, CKD VERSUS BERNARD ON CKD    ~Grey has a history in the past few years of modest kidney disease with an umremarkable work up. There was some hematuria in the past but that appears to have resolved. He does have some proteinuria in the 1g/g range and has been dipstick positive in the past.     ~The differential is fairly broad based on the available data. There is very likely some component of ischemic nephropathy given his known PAD and ASRAS, however the kidneys have remained essentially normal in size and are symmetric so this may not be the main issue.     I discussed with Grey that we often will do a kidney biopsy in cases such as his and I may recommend we do that if his kidney function does not improve with the IV fluids.     -Continue IVF    -Please do not start aspirin. May use subcutaneous heparin if desired and we can hold near the time of biopsy if performed.    -Will check INR PTT an PFC test in anticipation of possible biopsy.     <ANEMIA    ~Likely anemia of renal disease. May benefit from TARSHA to avoid need for transfusion.    -Will check iron studies.     <CKD BMD    ~Work up may be helpful to get a sense of chronicity    -Will add Phos, obtain PTH.         Tevin Lenz MD  [facility], Nephrology  Cell:[phone]  Pager:[phone]    Securely message with the Vocera Web Console (learn more here)  Text page via [employer] Paging/Directory         /\/\/\/\/\/\/\/\/\/\/\/\/\/\/\/\/\/\/\/\/\/\/\/\/\/\    Reason for Consult     I was asked to see the patient for BERNARD on CKD.    Primary Care Physician     Santosh Ortega    Chief Complaint     Abnormal renal function    History of Present Illness     Grey Candelario is a 57 year old male who presented having consulted with his usual nephrologist Dr. Alfaro with [facility]. The  creatinine was markedly increased over baseline and Dr. Alfaro recommended the patient go to the ED.    Upon presentation, Grey had a creatinine of 3.61 with some chronic elevation in the creatinine. His creatinine five years ago was 0.67. BUN is long 36.    Grey is very symptomatic. He finds solid food very distateful and cannot eat anything rich and has not been able for some time. He does not have clear dysgeusia or pruritus, but he is very fatigued. There is a history of severe leg cramping which is a bit hard to nadeen out with his known PAD and CKD.    There is some history of NSAID use, but says he has not taken any in at least the last month and tells me he would only take 1 or two of the OTC pills occaisionally. Some heavier use in the past.     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     Prior imaging is reviewed. On most recent belly CT without contrast the kidneys show extensive vascular calcification with fairly normal sized kidneys. Prior CT angio performed in 2019 showed extensive calcification of the renal arteries with only a partial stenosis on one side. The kidneys have not changed in size and are symmetric.     History is obtained from the patient and chart review.      Past Medical History   I have reviewed this patient's medical history and updated it with pertinent information if needed.   Past Medical History:   Diagnosis Date     Current smoker      HTN (hypertension)      Hx of heart artery stent     2018 2 stents placed     Hyperlipidemia        Past Surgical History   I have reviewed this patient's surgical history and updated it with pertinent information if needed.  Past Surgical History:   Procedure Laterality Date     IR LOWER EXTREMITY ANGIOGRAM BILATERAL  11/14/2019     OTHER SURGICAL HISTORY      hip surgery , hardware in place.       Prior to Admission Medications   Prior to Admission Medications   Prescriptions Last Dose  Informant Patient Reported? Taking?   losartan-hydrochlorothiazide (HYZAAR) 100-25 MG tablet 4/25/2023 at am Self Yes Yes   Sig: Take 1 tablet by mouth daily   rosuvastatin (CRESTOR) 40 MG tablet 4/25/2023 at am Self No Yes   Sig: TAKE 1 TABLET BY MOUTH ONE TIME DAILY      Facility-Administered Medications: None     [unfilled]  Allergies   Allergies   Allergen Reactions     Morphine Hcl Hives       Social History   I have reviewed this patient's social history and updated it with pertinent information if needed. Grey Candelario  reports that he has been smoking. He has been smoking an average of 1 pack per day. He has never used smokeless tobacco. He reports current alcohol use. He reports that he does not use drugs. The patient's usual occupation: [employer] .     Family History   I have reviewed this patient's family history and updated it with pertinent information if needed. No family history of kidney disease.   No family history on file.    Review of Systems   The 14 point Review of Systems is negative other than noted in the HPI.     Physical Exam   Temp: 97.7  F (36.5  C) Temp src: Oral BP: 124/65 Pulse: 76   Resp: 17 SpO2: 97 % O2 Device: None (Room air)    Vital Signs with Ranges  Temp:  [97.7  F (36.5  C)-98  F (36.7  C)] 97.7  F (36.5  C)  Pulse:  [73-99] 76  Resp:  [16-17] 17  BP: ()/(41-90) 124/65  SpO2:  [97 %-100 %] 97 %  171 lbs 0 oz    GENERAL APPEARANCE:  Alert, sallow and fatigued appearing, in no distress, cooperative, oriented x self, place and recent events.   EYES:  EOM, lids, pupils and irises normal, sclera clear and conjunctiva normal  ENT:  Mouth normal, moist mucous membranes, nose normal without drainage or crusting,   hearing acuity grossly intact  NECK: Supple, symmetrical, trachea midline, No JVD  RESP:  Respiratory effort normal,no respiratory distress, Lung sounds clear   CV:  Auscultation of heart done, rate and rhythm controlled and regular, +BOB  c/w aortic stenosis, no rub or gallop.   ABDOMEN:  Soft, nontender, no palpable masses or organomegaly.  EXT: Edema none bilateral lower extremities.  No gross deformities.  ACCESS:  SKIN:  skin on extremities warm, dry and intact without rashes  NEURO: cranial nerves grossly intact, no facial asymmetry, no speech deficits and able to follow directions, moves all extremities symmetrically  PSYCH:  insight and judgement and memory appear intact, affect and mood depressed    Data   BMP  Recent Labs   Lab 04/26/23  0546 04/25/23  2000 04/25/23  1521     --  135*   POTASSIUM 4.2  --  4.4   CHLORIDE 108*  --  98   MARY 9.0  --  9.9   CO2 22  --  23   BUN 36.2*  --  39.3*   CR 3.42*  --  3.61*   * 83 109*     Phos@LABNTIPR(phos:4)  CBC)  Recent Labs   Lab 04/25/23  1521   WBC 6.7   HGB 8.5*   HCT 25.7*   MCV 99        Recent Labs   Lab 04/25/23  1521   AST 34   ALT 29   ALKPHOS 89   BILITOTAL 0.4     No lab results found in last 7 days.  No results found for: D2VIT, D3VIT, DTOT  Recent Labs   Lab 04/25/23  1521   HGB 8.5*   HCT 25.7*   MCV 99     No results for input(s): PTHI in the last 168 hours.    =========================

## 2023-04-26 NOTE — PLAN OF CARE
DATE & TIME: 04/26/23 7068-3984    Cognitive Concerns/ Orientation: A&Ox4   BEHAVIOR & AGGRESSION TOOL COLOR: Green  CIWA SCORE: 4   ABNL VS/O2: VSS on RA  MOBILITY: Independent  PAIN MANAGMENT: Patient c/o mild HA - declines intervention at this time  DIET: Regular  BOWEL/BLADDER: Continent  ABNL LAB/BG: Cr 3.42; BUN 36.2  DRAIN/DEVICES: Left PIV infusing NS at 100mL/hr  TELEMETRY RHYTHM: NA  SKIN: Dry otherwise intact  TESTS/PROCEDURES: None  D/C DAY/GOALS/PLACE: Possibly home tomorrow 4/27  OTHER IMPORTANT INFO: Nephrology following.

## 2023-04-26 NOTE — PHARMACY-ADMISSION MEDICATION HISTORY
Pharmacist Admission Medication History    Admission medication history is complete. The information provided in this note is only as accurate as the sources available at the time of the update.    Medication reconciliation/reorder completed by provider prior to medication history? No    Information Source(s): Patient and CareEverywhere/SureScripts via in-person    Pertinent Information: None    Changes made to PTA medication list:    Added: Losartn-HCTZ    Deleted: Plavix; Ezetimibe; Lisinopril-hydrochlorothiazide    Changed: None    Medication Affordability: No issues in past 12 months       Allergies reviewed with patient and updates made in EHR: yes    Medication History Completed By: Jacque [facility] 4/25/2023 9:49 PM    Prior to Admission medications    Medication Sig Last Dose Taking? Auth Provider Long Term End Date   losartan-hydrochlorothiazide (HYZAAR) 100-25 MG tablet Take 1 tablet by mouth daily 4/25/2023 at am Yes Unknown, Entered By History     rosuvastatin (CRESTOR) 40 MG tablet TAKE 1 TABLET BY MOUTH ONE TIME DAILY 4/25/2023 at am Yes Ernestine Posada PA-C Yes

## 2023-04-26 NOTE — H&P
[facility]    History and Physical - Hospitalist Service       Date of Admission:  4/25/2023    Assessment & Plan      Grey Candelario is a 57 year old male admitted on 4/25/2023. He presents with acute renal failure    Acute renal failure  CKD  *Creatinine 2019 was at 0.84, 1.22 in 2/2022. BERNARD to 2.6-2.8 in 8-10/2022 reportedly around time of contrast administration. 10/2022 creatine was 1.4-1.8.  Saw nephrology 11/2022, was to see in 3 mo but lost to follow up  *was using mild-mod amounts NSAIDS until a month ago. Having sx of nausea, excessive sleeping, fatigue and shaking. Labs were checked and pt has creatinine of 3.6. In ED afebrile, mildly tachy.  UA w/o RBC, 100 protein. CT a/p without contrast w/o acute findings, kidneys with significant renal calcification, normal in appearance, no hydro.   - avoid nephrotoxins  - check pr/cr ratio  - check CRP  - nephrology consult  - IV fluids    Severe aortic stenosis 2/2022  HTN  HLD  *Stress echo in 2/2022 with EF 65-75%, severe aortic stenosis, valve area 1.0, gradient 36 mm Hg, nl LV size  *c/o significant progressive SOB with exertion. Doesn't appear volume overloaded on admission  - echocardiogram  - cardiology consult re: severe aortic stenosis  - hold PTA lisinopril/HCTZ   - resume crestor with rec    Alcohol use disorder  Forthcoming that he is alcoholic, drinks 4-5 vodka drinks a day. No hx withdrawal in the past.   - CIWA  - prn lorazepam  - oral vitamins    Anemia  Hgb 8.4 on presentation. Hgb 11/2022 was 11.7. Likely in part 2/2 renal disease. Tsat 4/2023 51%, ferritin normal. .   - monitor    PAD  Atherosclerosis arteriosclerosis obliterans  S/p bilateral common external iliac stenting in 2018 ultimately needing R iliofemoral bypass and L femoral endarterectomy/ patch angioplasty 2/2022.   Not currently on a/c for this    Chronic low back pain  Hx lumbar decompression  Hx spinal fusion ~20 yrs ago  Not currently  taking pain meds for this.     Diet:   regular  DVT Prophylaxis: Ambulate every shift  Ortez Catheter: Not present  Lines: None     Cardiac Monitoring: None  Code Status:   Full    Clinically Significant Risk Factors Present on Admission                # Drug Induced Platelet Defect: home medication list includes an antiplatelet medication   # Hypertension: home medication list includes antihypertensive(s)              Disposition Plan        I have personally reviewed the following data over the past 24 hrs:    6.7  \   8.5 (L)   / 201     135 (L) 98 39.3 (H) /  83   4.4 23 3.61 (H) \       ALT: 29 AST: 34 AP: 89 TBILI: 0.4   ALB: 4.4 TOT PROTEIN: 8.2 LIPASE: 81 (H)       Imaging results reviewed over the past 24 hrs:   Recent Results (from the past 24 hour(s))   CT Abdomen Pelvis w/o Contrast    Narrative    EXAM: CT ABDOMEN PELVIS W/O CONTRAST  LOCATION: [facility]  DATE/TIME: 4/25/2023 8:58 PM CDT    INDICATION: Abdominal distention, increased lipase, anorexia, new renal insufficiency.    COMPARISON: 01/22/2021  TECHNIQUE: CT scan of the abdomen and pelvis was performed without IV contrast. Multiplanar reformats were obtained. Dose reduction techniques were used.  CONTRAST: None.    FINDINGS:   LOWER CHEST: No significant infiltrate or effusion.    HEPATOBILIARY: Normal noncontrast liver parenchyma. No calcified gallstones.    PANCREAS: Normal noncontrast appearance of the pancreas. No ductal dilation or adjacent inflammatory change.    SPLEEN: Normal.    ADRENAL GLANDS: Normal.    KIDNEYS/BLADDER: Normal noncontrast appearance of the kidneys. No nephrolithiasis or hydronephrosis. Significant renal vascular calcification noted. Ureters unremarkable. Mildly distended urinary bladder.    BOWEL: No evidence of bowel obstruction or inflammation.    LYMPH NODES: No lymphadenopathy.    VASCULATURE: Diffuse atherosclerotic vascular calcification with bilateral common-external iliac stents and  postoperative change in both groins.    PELVIC ORGANS: Mild prostatic enlargement. No pelvic mass.    MUSCULOSKELETAL: Degenerative and postoperative change lumbar spine. Mild superior endplate compression T12, new relative to 2021. No concerning lytic or sclerotic osseous lesions.      Impression    IMPRESSION:   1.  No acute abdominal/pelvic findings.    2.  Normal noncontrast appearance of the pancreas. No ductal dilation or adjacent inflammation.    3.  Aside from significant renal vascular calcification, kidneys are normal in unenhanced appearance. No hydronephrosis. Mildly distended urinary bladder and mild prostatic enlargement noted.    4.  Diffuse atherosclerotic vascular calcification with bilateral common-external iliac stents and postoperative changes in both groins.    5.  Mild superior endplate compression T12 new relative to 2021.            Karl Barraza MD  Hospitalist Service  [facility]  Securely message with [employer] (more info)  Text page via [facility] Paging/Directory     ______________________________________________________________________    Chief Complaint   Acute renal failure    History is obtained from the patient, electronic health record and emergency department physician    History of Present Illness   Grey Candelario is a 57 year old male who presents with acute renal failure.  He has a history of atherosclerosis arteriosclerosis obliterans, aortic valve disease, and chronic back issues.  Patient had a baseline creatinine of about 0.8 in 2019.  Over time he has had bouts of acute kidney injury, most recent in August and October 2022 where his creatinine got as high as 2.8.  However, late last year in 2022 his creatinine had come down to 1.4.  There do not appear to be any checks in the interim.  Patient reports he has had worsening symptoms of severe fatigue, somnolence sleeping up to 14 hours a day, chills and shakes, as well as shortness of breath.  He was  to follow-up with nephrology after 3 months earlier this year but he was lost to follow-up until he had labs done recently given his symptoms.  There was found that he had a creatinine of 3.6.  He was advised to come to the emergency department.  Here he is doing okay.  At rest he denies any shortness of breath but states he gets short of breath with exertion.  He also has difficulty with shortness with walking 10 feet; however, he states he is able to get on the bike and does well with respect to his back and his legs.  He has been taking ibuprofen, couple tablets a day, until about a month ago.  He currently is not using any pain medications.  He does have nausea with some dry heaves and this is off and on.  He states he can only tolerate tiny meals a day.  He has no constipation.  Denies any bloody or black stools.  He does have cramping in his legs.      Past Medical History    Past Medical History:   Diagnosis Date     Current smoker      HTN (hypertension)      Hx of heart artery stent     2018 2 stents placed     Hyperlipidemia        Past Surgical History   Past Surgical History:   Procedure Laterality Date     IR LOWER EXTREMITY ANGIOGRAM BILATERAL  11/14/2019     OTHER SURGICAL HISTORY      hip surgery , hardware in place.       Prior to Admission Medications   Prior to Admission Medications   Prescriptions Last Dose Informant Patient Reported? Taking?   clopidogrel (PLAVIX) 75 MG tablet   No No   Sig: Take 1 tablet (75 mg) by mouth daily   ezetimibe (ZETIA) 10 MG tablet   No No   Sig: Take 1 tablet (10 mg) by mouth daily   lisinopril-hydrochlorothiazide (PRINZIDE/ZESTORETIC) 10-12.5 MG per tablet   Yes No   Sig: Take 2 tablets by mouth every morning    rosuvastatin (CRESTOR) 40 MG tablet   No No   Sig: TAKE 1 TABLET BY MOUTH ONE TIME DAILY   Patient taking differently: Take 40 mg by mouth every morning       Facility-Administered Medications: None        Review of Systems    The 10 point Review of Systems  is negative other than noted in the HPI or here.      Physical Exam   Vital Signs: Temp: 97.7  F (36.5  C) Temp src: Oral BP: (!) 147/84 Pulse: 94   Resp: 17 SpO2: 98 % O2 Device: None (Room air)    Weight: 171 lbs 0 oz    General Appearance: Alert, very pleasant, no distress  Respiratory: CTA B  Cardiovascular: RRR with BOB. No edema  GI: soft, nt/nd  Skin: no rashes or lesions  Other: CN grossly intact, GARCIA     Medical Decision Making       90 MINUTES SPENT BY ME on the date of service doing chart review, history, exam, documentation & further activities per the note.      Data     I have personally reviewed the following data over the past 24 hrs:    6.7  \   8.5 (L)   / 201     135 (L) 98 39.3 (H) /  83   4.4 23 3.61 (H) \       ALT: 29 AST: 34 AP: 89 TBILI: 0.4   ALB: 4.4 TOT PROTEIN: 8.2 LIPASE: 81 (H)       Imaging results reviewed over the past 24 hrs:   Recent Results (from the past 24 hour(s))   CT Abdomen Pelvis w/o Contrast    Narrative    EXAM: CT ABDOMEN PELVIS W/O CONTRAST  LOCATION: [facility]  DATE/TIME: 4/25/2023 8:58 PM CDT    INDICATION: Abdominal distention, increased lipase, anorexia, new renal insufficiency.    COMPARISON: 01/22/2021  TECHNIQUE: CT scan of the abdomen and pelvis was performed without IV contrast. Multiplanar reformats were obtained. Dose reduction techniques were used.  CONTRAST: None.    FINDINGS:   LOWER CHEST: No significant infiltrate or effusion.    HEPATOBILIARY: Normal noncontrast liver parenchyma. No calcified gallstones.    PANCREAS: Normal noncontrast appearance of the pancreas. No ductal dilation or adjacent inflammatory change.    SPLEEN: Normal.    ADRENAL GLANDS: Normal.    KIDNEYS/BLADDER: Normal noncontrast appearance of the kidneys. No nephrolithiasis or hydronephrosis. Significant renal vascular calcification noted. Ureters unremarkable. Mildly distended urinary bladder.    BOWEL: No evidence of bowel obstruction or  inflammation.    LYMPH NODES: No lymphadenopathy.    VASCULATURE: Diffuse atherosclerotic vascular calcification with bilateral common-external iliac stents and postoperative change in both groins.    PELVIC ORGANS: Mild prostatic enlargement. No pelvic mass.    MUSCULOSKELETAL: Degenerative and postoperative change lumbar spine. Mild superior endplate compression T12, new relative to 2021. No concerning lytic or sclerotic osseous lesions.      Impression    IMPRESSION:   1.  No acute abdominal/pelvic findings.    2.  Normal noncontrast appearance of the pancreas. No ductal dilation or adjacent inflammation.    3.  Aside from significant renal vascular calcification, kidneys are normal in unenhanced appearance. No hydronephrosis. Mildly distended urinary bladder and mild prostatic enlargement noted.    4.  Diffuse atherosclerotic vascular calcification with bilateral common-external iliac stents and postoperative changes in both groins.    5.  Mild superior endplate compression T12 new relative to 2021.

## 2023-04-26 NOTE — ED PROVIDER NOTES
"    History     Chief Complaint:  Abnormal Labs     HPI   Grey Candelario is a 57 year old male who has a history of CKD, hypertension, hyperlipidemia, s/p plavix  who presents with abnormal creatinine as well as new onset anemia.  Previously patient's creatinine levels were around 1.4.  In November 2022 patient was referred to nephrology where he had a creatinine of 1.37.. His nephrologist recommended him to go to the ED after noticing that his creatinine levels were elevated. He has trouble breathing, is pale looking, and has headaches. He is nauseated from the smell of food and has not eaten for days.   Patient had outpatient labs today that demonstrated creatinine of 3.6 and hemoglobin 8.4 and he was referred to the emergency department.  Patient states over the last 2 to 3 months he has had increased abdominal bloating as well as weight loss over the last 2 weeks.  Patient in addition states that he intermittently has lack of appetite and all foods make him feel nauseous.  He denies abdominal pain.  Denies chest pain.  He does have a \"calcium on his aortic valve\".  Patient has not followed this up.  He denies any chest pain or shortness of breath.  He endorses feeling shaky intermittently as well as sleeping up to 14 hours a day.  Patient states he drinks 4-5 hard alcoholic drinks per day but has not had any alcohol today.    Independent Historian:   Spouse/partner    Review of External Notes: Nephrology notes from today and 11/30/2022    ROS:  Review of Systems   Constitutional: Positive for appetite change and unexpected weight change.   Respiratory: Positive for shortness of breath.    Cardiovascular: Negative for chest pain.   Gastrointestinal: Positive for abdominal distention and nausea.   Skin: Positive for color change.   Neurological: Positive for tremors and light-headedness.   Psychiatric/Behavioral: Positive for sleep disturbance.   All other systems reviewed and are " "negative.    Allergies:  Morphine Hcl     Medications:    clopidogrel   ezetimibe   lisinopril-hydrochlorothiazide   rosuvastatin     Past Medical History:    Current smoker  Hypertension  Hyperlipidemia   CKD  Macrocytic anemia  PAD  Lumbar stenosis  Renal artery stenosis  Depression  Aortic valve stenosis  Nondependent alcohol abuse     Past Surgical History:    Bilateral angiogram  Hip surgery     Family History:    family history is not on file.    Social History:   reports that he has been smoking. He has been smoking an average of 1 pack per day. He has never used smokeless tobacco. He reports current alcohol use. He reports that he does not use drugs.  PCP: Santosh Ortega     Physical Exam     Patient Vitals for the past 24 hrs:   BP Temp Temp src Pulse Resp SpO2 Height Weight   04/25/23 2009 (!) 147/84 97.7  F (36.5  C) Oral 94 17 98 % -- --   04/25/23 1514 134/59 98  F (36.7  C) Oral 95 16 100 % 1.778 m (5' 10\") 77.6 kg (171 lb)      Physical Exam  General: Patient is alert and normal appearing.  HEENT: Head atraumatic    Eyes: pupils equal and reactive. Conjunctiva clear   Nares: patent   Oropharynx: no lesions, uvula midline, no palatal draping, normal voice, no trismus  Neck: Supple without lymphadenopathy, no meningismus  Chest: Heart regular rate and rhythm.   Lungs: Equal clear to auscultation with no wheeze or rales  Abdomen: Soft, non tender, nondistended, normal bowel sounds  Back: No costovertebral angle tenderness, no midline C, T or L spine tenderness  Neuro: Grossly nonfocal, normal speech, strength equal bilaterally, CN 2-12 intact  Extremities: No deformities, equal radial and DP pulses. No clubbing, cyanosis.  No edema  Skin: Warm and dry with no rash.   Emergency Department Course   ECG  ECG results from 04/25/23   EKG 12-lead, tracing only     Value    Systolic Blood Pressure     Diastolic Blood Pressure     Ventricular Rate 81    Atrial Rate 81    NV Interval 154    QRS " Duration 80        QTc 446    P Axis 59    R AXIS 66    T Axis 57    Interpretation ECG      Sinus rhythm  Normal ECG  When compared with ECG of 10-AUG-2018 16:25,  No significant change was found  Confirmed by GENERATED REPORT, COMPUTER ([id number]),  Alessandro Longo ([id number]) on 4/25/2023 7:16:06 PM       Imaging:  CT Abdomen Pelvis w/o Contrast   Preliminary Result   IMPRESSION:    1.  No acute abdominal/pelvic findings.      2.  Normal noncontrast appearance of the pancreas. No ductal dilation or adjacent inflammation.      3.  Aside from significant renal vascular calcification, kidneys are normal in unenhanced appearance. No hydronephrosis. Mildly distended urinary bladder and mild prostatic enlargement noted.      4.  Diffuse atherosclerotic vascular calcification with bilateral common-external iliac stents and postoperative changes in both groins.      5.  Mild superior endplate compression T12 new relative to 2021.           Report per radiology    Laboratory:  Labs Ordered and Resulted from Time of ED Arrival to Time of ED Departure   COMPREHENSIVE METABOLIC PANEL - Abnormal       Result Value    Sodium 135 (*)     Potassium 4.4      Chloride 98      Carbon Dioxide (CO2) 23      Anion Gap 14      Urea Nitrogen 39.3 (*)     Creatinine 3.61 (*)     Calcium 9.9      Glucose 109 (*)     Alkaline Phosphatase 89      AST 34      ALT 29      Protein Total 8.2      Albumin 4.4      Bilirubin Total 0.4      GFR Estimate 19 (*)    ROUTINE UA WITH MICROSCOPIC REFLEX TO CULTURE - Abnormal    Color Urine Straw      Appearance Urine Clear      Glucose Urine Negative      Bilirubin Urine Negative      Ketones Urine Negative      Specific Gravity Urine 1.013      Blood Urine Trace (*)     pH Urine 7.5 (*)     Protein Albumin Urine 100 (*)     Urobilinogen Urine Normal      Nitrite Urine Negative      Leukocyte Esterase Urine Negative      Mucus Urine Present (*)     RBC Urine 1      WBC Urine 3      Hyaline Casts Urine  1     LIPASE - Abnormal    Lipase 81 (*)    CBC WITH PLATELETS AND DIFFERENTIAL - Abnormal    WBC Count 6.7      RBC Count 2.60 (*)     Hemoglobin 8.5 (*)     Hematocrit 25.7 (*)     MCV 99      MCH 32.7      MCHC 33.1      RDW 13.2      Platelet Count 201      % Neutrophils 59      % Lymphocytes 27      % Monocytes 10      % Eosinophils 3      % Basophils 1      % Immature Granulocytes 0      NRBCs per 100 WBC 0      Absolute Neutrophils 4.0      Absolute Lymphocytes 1.8      Absolute Monocytes 0.7      Absolute Eosinophils 0.2      Absolute Basophils 0.0      Absolute Immature Granulocytes 0.0      Absolute NRBCs 0.0     GLUCOSE BY METER - Normal    GLUCOSE BY METER POCT 83     PROTEIN RANDOM URINE      Emergency Department Course & Assessments:    Interventions:  Medications   sodium chloride 0.9% infusion (has no administration in time range)   LORazepam (ATIVAN) injection 1 mg (has no administration in time range)   0.9% sodium chloride BOLUS (0 mLs Intravenous Stopped 4/25/23 1901)      Independent Interpretation (X-rays, CTs, rhythm strip):  none    Consultations:  2108 I spoke with Dr. Barraza of the Hospitalist service to discuss the patient's findings, presentation, and plan of care.    Social Determinants of Health affecting care:  none    Assessments:  2036 I obtained history and examined patient.    Disposition:  The patient was admitted to the hospital under the care of Dr. Barraza.     Impression & Plan    Medical Decision Making:  Patient is a 57-year-old male with hypertension, back surgeries, chronic renal insufficiency with creatinines of 1.3-1.86 that was noted in November as well as peripheral artery disease with multiple stents in his legs who presents the emergency department with increased creatinine, fatigue, generalized weakness, intermittent shaking and outpatient labs today revealing anemia of hemoglobin 8.2 and creatinine 3.6.  Patient was referred to the emergency department.  He  endorses decreased p.o. intake over the last several months that is intermittent and unable to eat anything for days at a time.  Suspect some component of prerenal but also may be related to vascular disease poor perfusion to his kidneys.  Patient with mildly elevated lipase as well.  In the setting of decreased weight, anorexia, fatigue and abdominal distention CT scan of his abdomen pelvis was obtained.  No acute findings except for renal vascular calcifications.  Patient is agreeable with the plan for admission.  All patient's questions and concerns addressed      Diagnosis:    ICD-10-CM    1. BERNARD (acute kidney injury) (H)  N17.9       2. Anemia, unspecified type  D64.9       3. Nausea  R11.0            Scribe Disclosure:  Jose Alfredo MOREIRA Hired, am serving as a scribe at 8:36 PM on 4/25/2023 to document services personally performed by Viri Landis MD based on my observations and the provider's statements to me.    4/25/2023   Viri Landis MD Neuner, Maria Bea, MD  04/25/23 2134

## 2023-04-26 NOTE — TELEPHONE ENCOUNTER
----- Message from Rafaela Booker RN sent at 4/26/2023  9:58 AM CDT -----  Regarding: Not urgent- please fax records request to PCP for last OV notes  [facility]   Fax [phone]     Patient given Rx for glasses.

## 2023-04-26 NOTE — PROGRESS NOTES
RECEIVING UNIT ED HANDOFF REVIEW    ED Nurse Handoff Report was reviewed by: Latha Galindo RN on April 26, 2023 at 3:21 PM

## 2023-04-26 NOTE — PROGRESS NOTES
[facility]    Medicine Progress Note - Hospitalist Service    Date of Admission:  4/25/2023    Assessment & Plan   Grey Candelario is a 57 year old male admitted on 4/25/2023. He presents with acute renal failure     Acute renal failure  CKD  *Creatinine 2019 was at 0.84, 1.22 in 2/2022. BERNARD to 2.6-2.8 in 8-10/2022 reportedly around time of contrast administration. 10/2022 creatine was 1.4-1.8.  Saw nephrology 11/2022, was to see in 3 mo but lost to follow up  *was using mild-mod amounts NSAIDS until a month ago. Having sx of nausea, excessive sleeping, fatigue and shaking. Labs were checked and pt has creatinine of 3.6. In ED afebrile, mildly tachy.   -CT a/p without contrast w/o acute findings, kidneys with significant renal calcification, normal in appearance, no hydro.   -Nephrology following, appreciate input.  Discussed with Dr. Lenz regarding treatment plan  -Continue IV fluids  -Hold PTA lisinopril/HCTZ and avoid any other nephrotoxins  -If renal function does not improve significantly, nephrology is planning on doing kidney biopsy on Friday 4/28.  Recommends that aspirin not be started in the interim and INR PTT and PFC ordered in anticipation of biopsy     Severe aortic stenosis 2/2022  HTN  HLD  *Stress echo in 2/2022 with EF 65-75%, severe aortic stenosis, valve area 1.0, gradient 36 mm Hg, nl LV size  *c/o significant progressive SOB with exertion. Doesn't appear volume overloaded on admission  - echocardiogram with EF of 60-65%, mild to moderate valvular aortic stenosis, no regional wall motion abnormality  - cardiology has been consulted awaiting input  -Hold PTA Crestor given muscle cramps until CK level available     Lower extremity cramps  -Likely multifactorial in the setting of PAD, BERNARD, history of lumbar decompression/spinal fusion  -Given anemia, will also check his iron studies   -Also check his magnesium and CK level    Alcohol use disorder  Forthcoming  that he is alcoholic, drinks 4-5 vodka drinks a day. No hx withdrawal in the past.   - CIWA  - prn lorazepam  - oral vitamins     Anemia  Hgb 8.4 on presentation. Hgb on October and November through [facility] was in the 11 range however looking back at his records back in October 2022 through [facility] his hemoglobin was in the 8s multiple times,?  Error on  -Patient denies any active bleeding  -Iron studies, B12 and folate ordered     PAD  Atherosclerosis arteriosclerosis obliterans  S/p bilateral common external iliac stenting in 2018 ultimately needing R iliofemoral bypass and L femoral endarterectomy/ patch angioplasty 2/2022.   Not currently on antiplatelet or anticoagulant for this     Chronic low back pain  Hx lumbar decompression  Hx spinal fusion ~20 yrs ago  Not currently taking pain meds for this.        Diet: Regular Diet Adult    DVT Prophylaxis: Pneumatic Compression Devices  Ortez Catheter: Not present  Lines: None     Cardiac Monitoring: None  Code Status: Full Code      Clinically Significant Risk Factors Present on Admission                  # Hypertension: home medication list includes antihypertensive(s)              Disposition Plan      Expected Discharge Date: 04/27/2023                  Tabatha Carnes MD  Hospitalist Service  [facility]  Securely message with [employer] (more info)  Text page via [employer] Paging/Directory   ______________________________________________________________________    Interval History   Care assumed, chart reviewed, discussed with nephrology regarding treatment plan, patient reports that at rest he has been okay but significant dyspnea with minimal exertion.  Also has been worried about lower extremity cramps.    Physical Exam   Vital Signs: Temp: 97.7  F (36.5  C) Temp src: Oral BP: (!) 115/90 Pulse: 73   Resp: 17 SpO2: 97 % O2 Device: None (Room air)    Weight: 171 lbs 0 oz    Exam:  Constitutional: Awake, alert and no distress.  Appears comfortable  Head: Normocephalic. No masses, lesions, tenderness or abnormalities  ENT: ENT exam normal, no neck nodes or sinus tenderness  Cardiovascular: RRR.  2+ murmurs, no rubs or JVD  Respiratory: Normal WOB,b/l equal air entry, no wheezes or crackles   Gastrointestinal: Abdomen soft, non-tender. BS normal. No masses, organomegaly  : Deferred  Extremities : No edema , no clubbing or cyanosis          Medical Decision Making       55 MINUTES SPENT BY ME on the date of service doing chart review, history, exam, documentation & further activities per the note.      Data     I have personally reviewed the following data over the past 24 hrs:    N/A  \   N/A   / N/A     139 108 (H) 36.2 (H) /  100 (H)   4.2 22 3.42 (H) \       ALT: N/A AST: N/A AP: N/A TBILI: N/A   ALB: N/A TOT PROTEIN: N/A LIPASE: N/A       Procal: N/A CRP: N/A Lactic Acid: N/A       INR:  1.06 PTT:  30   D-dimer:  N/A Fibrinogen:  N/A       Imaging results reviewed over the past 24 hrs:   Recent Results (from the past 24 hour(s))   CT Abdomen Pelvis w/o Contrast    Narrative    EXAM: CT ABDOMEN PELVIS W/O CONTRAST  LOCATION: [facility]  DATE/TIME: 4/25/2023 8:58 PM CDT    INDICATION: Abdominal distention, increased lipase, anorexia, new renal insufficiency.    COMPARISON: 01/22/2021  TECHNIQUE: CT scan of the abdomen and pelvis was performed without IV contrast. Multiplanar reformats were obtained. Dose reduction techniques were used.  CONTRAST: None.    FINDINGS:   LOWER CHEST: No significant infiltrate or effusion.    HEPATOBILIARY: Normal noncontrast liver parenchyma. No calcified gallstones.    PANCREAS: Normal noncontrast appearance of the pancreas. No ductal dilation or adjacent inflammatory change.    SPLEEN: Normal.    ADRENAL GLANDS: Normal.    KIDNEYS/BLADDER: Normal noncontrast appearance of the kidneys. No nephrolithiasis or hydronephrosis. Significant renal vascular calcification noted. Ureters  unremarkable. Mildly distended urinary bladder.    BOWEL: No evidence of bowel obstruction or inflammation.    LYMPH NODES: No lymphadenopathy.    VASCULATURE: Diffuse atherosclerotic vascular calcification with bilateral common-external iliac stents and postoperative change in both groins.    PELVIC ORGANS: Mild prostatic enlargement. No pelvic mass.    MUSCULOSKELETAL: Degenerative and postoperative change lumbar spine. Mild superior endplate compression T12, new relative to . No concerning lytic or sclerotic osseous lesions.      Impression    IMPRESSION:   1.  No acute abdominal/pelvic findings.    2.  Normal noncontrast appearance of the pancreas. No ductal dilation or adjacent inflammation.    3.  Aside from significant renal vascular calcification, kidneys are normal in unenhanced appearance. No hydronephrosis. Mildly distended urinary bladder and mild prostatic enlargement noted.    4.  Diffuse atherosclerotic vascular calcification with bilateral common-external iliac stents and postoperative changes in both groins.    5.  Mild superior endplate compression T12 new relative to .     Echocardiogram Complete   Result Value    LVEF  60-65%    Narrative    [id number]  [id number]  [id number]  [id number]^TALITA^TIBURCIO^SHANTHI     [facility]  Echocardiography Laboratory  [address] [place] [address]     Name: MARY LOU OWENS  MRN: [id number]  : 1965  Study Date: 2023 09:49 AM  Age: 57 yrs  Gender: Male  Patient Location: [facility]  Reason For Study: Aortic Valve Disorder  Ordering Physician: TIBURCIO SANON  Referring Physician: TIBURCIO SANON  Performed By: Msaood Ortiz     BSA: 2.0 m2  Height: 70 in  Weight: 171 lb  HR: 84  BP: 129/58 mmHg  ______________________________________________________________________________  Procedure  Complete Echo Adult. [employer] (NDC #[id number]) given  intravenously.  ______________________________________________________________________________  Interpretation Summary     Left ventricular systolic function is normal.  The visual ejection fraction is 60-65%.  No regional wall motion abnormalities noted.  AV not werll visualzied, bicuspir aortic valve not excluded.  Mild to moderate valvular aortic stenosis.  The mean AoV pressure gradient is 19mmHg.  The study was technically adequate. There is no comparison study available.  ______________________________________________________________________________  Left Ventricle  The left ventricle is normal in size. There is normal left ventricular wall  thickness. Left ventricular systolic function is normal. The visual ejection  fraction is 60-65%. Left ventricular diastolic function is indeterminate. No  regional wall motion abnormalities noted.     Right Ventricle  The right ventricle is normal size. The right ventricular systolic function is  normal.     Atria  Normal left atrial size. Right atrial size is normal.     Mitral Valve  There is mild mitral annular calcification. There is trace mitral  regurgitation.     Tricuspid Valve  No tricuspid regurgitation. Right ventricular systolic pressure could not be  approximated due to inadequate tricuspid regurgitation. IVC diameter <2.1 cm  collapsing >50% with sniff suggests a normal RA pressure of 3 mmHg.     Aortic Valve  The aortic valve is not well visualized. It is moderately thickened and  calcified. Mild to moderate valvular aortic stenosis. The mean AoV pressure  gradient is 19mmHg.     Pulmonic Valve  The pulmonic valve is not well seen, but is grossly normal.     Vessels  The aortic root is normal size.     Pericardium  There is no pericardial effusion.     Rhythm  Sinus rhythm was noted.  ______________________________________________________________________________  MMode/2D Measurements & Calculations  IVSd: 1.0 cm     LVIDd: 4.2 cm  LVIDs: 2.7 cm  LVPWd: 1.0  cm  FS: 35.7 %  LV mass(C)d: 137.2 grams  LV mass(C)dI: 70.3 grams/m2  Ao root diam: 3.3 cm  LA dimension: 3.7 cm  asc Aorta Diam: 3.2 cm  LA/Ao: 1.1  LVOT diam: 2.3 cm  LVOT area: 4.0 cm2  LA Volume (BP): 41.6 ml  LA Volume Index (BP): 21.3 ml/m2  RV Base: 3.1 cm  RWT: 0.48     TAPSE: 1.8 cm     Doppler Measurements & Calculations  MV E max smooth: 121.0 cm/sec  MV A max smooth: 135.0 cm/sec  MV E/A: 0.90  MV dec time: 0.22 sec  Ao V2 max: 288.7 cm/sec  Ao max P.0 mmHg  Ao V2 mean: 205.3 cm/sec  Ao mean P.0 mmHg  Ao V2 VTI: 62.6 cm  ARACELIS(I,D): 1.4 cm2  ARACELIS(V,D): 1.4 cm2  LV V1 max PG: 3.8 mmHg  LV V1 max: 98.0 cm/sec  LV V1 VTI: 22.0 cm  SV(LVOT): 88.8 ml  SI(LVOT): 45.5 ml/m2  PA acc time: 0.12 sec  AV Smooth Ratio (DI): 0.34  ARACELIS Index (cm2/m2): 0.73  E/E' avg: 15.4  Lateral E/e': 13.7  Medial E/e': 17.1  RV S Smooth: 16.5 cm/sec     ______________________________________________________________________________  Report approved by: Carmina Downey 2023 11:12 AM           Recent Labs   Lab 23  1507 23  0546 23  2000 23  1521   WBC  --   --   --  6.7   HGB  --   --   --  8.5*   MCV  --   --   --  99   PLT  --   --   --  201   INR 1.06  --   --   --    NA  --  139  --  135*   POTASSIUM  --  4.2  --  4.4   CHLORIDE  --  108*  --  98   CO2  --  22  --  23   BUN  --  36.2*  --  39.3*   CR  --  3.42*  --  3.61*   ANIONGAP  --  9  --  14   MARY  --  9.0  --  9.9   GLC  --  100* 83 109*   ALBUMIN  --   --   --  4.4   PROTTOTAL  --   --   --  8.2   BILITOTAL  --   --   --  0.4   ALKPHOS  --   --   --  89   ALT  --   --   --  29   AST  --   --   --  34   LIPASE  --   --   --  81*

## 2023-04-26 NOTE — ED NOTES
"[facility]  ED Nurse Handoff Report    ED Chief complaint: Abnormal Labs      ED Diagnosis:   Final diagnoses:   BERNARD (acute kidney injury) (H)   Anemia, unspecified type   Nausea       Code Status: Full Code    Allergies:   Allergies   Allergen Reactions    Morphine Hcl Hives       Patient Story: abnormal lab  Focused Assessment:  Patient sent to ED for abnormal Cr level per nephrologist. Been having difficulty breathing, nauseated and not eating for days, significant others reports patient drinks large amount of alcohol each day.     Treatments and/or interventions provided: see MAR  Patient's response to treatments and/or interventions: TBD    To be done/followed up on inpatient unit:  close monitor    Does this patient have any cognitive concerns?:  na    Activity level - Baseline/Home:  Independent  Activity Level - Current:   Stand with Assist    Patient's Preferred language: English   Needed?: No    Isolation: None  Infection: Not Applicable  Patient tested for COVID 19 prior to admission: NO  Bariatric?: No    Vital Signs:   Vitals:    04/25/23 1514 04/25/23 2009   BP: 134/59 (!) 147/84   Pulse: 95 94   Resp: 16 17   Temp: 98  F (36.7  C) 97.7  F (36.5  C)   TempSrc: Oral Oral   SpO2: 100% 98%   Weight: 77.6 kg (171 lb)    Height: 1.778 m (5' 10\")        Cardiac Rhythm:     Was the PSS-3 completed:   Yes  What interventions are required if any?               Family Comments: significant other at bedside  OBS brochure/video discussed/provided to patient/family: N/A              Name of person given brochure if not patient: na              Relationship to patient: na    For the majority of the shift this patient's behavior was Green.   Behavioral interventions performed were none.    ED NURSE PHONE NUMBER: *[phone]         "
Patient's wife has come to the desk multiple times asking staff for wait times and expressing her frustration for the long wait. The last time, she reported that her  had now started shaking. Writer went to 's location, checked a BG, and obtained vital signs. Writer apologized for the wait time and reassured him that ED staff are working to improve flow and provide cares in a timely manner. Patient and wife remain frustrated.   
Rapid Assessment Note    History:   Grey Candelario is a 57 year old male who has a history of hypertension, hyperlipidemia, who presents with abnormal levels. His nephrologist recommended him to go to the ED after noticing that his creatinine levels were elevated. He has trouble breathing, is pale looking, and has headaches. He is nauseated from the smell of food and has not eaten for days.     Exam:   General:  Alert, interactive  Cardiovascular:  Well perfused  Lungs:  No respiratory distress, no accessory muscle use  Neuro:  Moving all 4 extremities  Skin:  Warm, dry  Psych:  Normal affect      Plan of Care:   I evaluated the patient and developed an initial plan of care. I discussed this plan and explained that I, or one of my partners, would be returning to complete the evaluation.         I, Jose Alfredo Hired, am serving as a scribe to document services personally performed by Viri Landis MD based on my observations and the provider's statements to me.    4/25/2023  [employer]    Portions of this medical record were completed by a scribe. UPON MY REVIEW AND AUTHENTICATION BY ELECTRONIC SIGNATURE, this confirms (a) I performed the applicable clinical services, and (b) the record is accurate.        Viri Landis MD  04/25/23 8164    
You can access the FollowMyHealth Patient Portal offered by [facility] by registering at the following website: [email or web address]. By joining [employer]’s FollowMyHealth portal, you will also be able to view your health information using other applications (apps) compatible with our system.

## 2023-04-27 LAB
ABO/RH(D): NORMAL
ALBUMIN MFR UR ELPH: 40.6 MG/DL (ref 1–14)
ALBUMIN SERPL BCG-MCNC: 3.7 G/DL (ref 3.5–5.2)
ALBUMIN UR-MCNC: 50 MG/DL
ALP SERPL-CCNC: 69 U/L (ref 40–129)
ALT SERPL W P-5'-P-CCNC: 17 U/L (ref 10–50)
ANION GAP SERPL CALCULATED.3IONS-SCNC: 9 MMOL/L (ref 7–15)
ANTIBODY SCREEN: NEGATIVE
APPEARANCE UR: CLEAR
APTT PPP: 29 SECONDS (ref 22–38)
AST SERPL W P-5'-P-CCNC: 26 U/L (ref 10–50)
BASOPHILS # BLD AUTO: 0 10E3/UL (ref 0–0.2)
BASOPHILS NFR BLD AUTO: 0 %
BILIRUB DIRECT SERPL-MCNC: <0.2 MG/DL (ref 0–0.3)
BILIRUB SERPL-MCNC: 0.3 MG/DL
BILIRUB UR QL STRIP: NEGATIVE
BLD PROD TYP BPU: NORMAL
BLOOD COMPONENT TYPE: NORMAL
BUN SERPL-MCNC: 27.6 MG/DL (ref 8–23)
CALCIUM SERPL-MCNC: 8.9 MG/DL (ref 8.6–10)
CHLORIDE SERPL-SCNC: 110 MMOL/L (ref 98–107)
CODING SYSTEM: NORMAL
COLOR UR AUTO: ABNORMAL
CREAT SERPL-MCNC: 3.03 MG/DL (ref 0.67–1.17)
CREAT UR-MCNC: 54.2 MG/DL
CROSSMATCH: NORMAL
DAT POLY: NEGATIVE
DEPRECATED HCO3 PLAS-SCNC: 19 MMOL/L (ref 22–29)
EOSINOPHIL # BLD AUTO: 0.3 10E3/UL (ref 0–0.7)
EOSINOPHIL NFR BLD AUTO: 6 %
ERYTHROCYTE [DISTWIDTH] IN BLOOD BY AUTOMATED COUNT: 13.3 % (ref 10–15)
FIBRINOGEN PPP-MCNC: 451 MG/DL (ref 170–490)
GFR SERPL CREATININE-BSD FRML MDRD: 23 ML/MIN/1.73M2
GLUCOSE SERPL-MCNC: 93 MG/DL (ref 70–99)
GLUCOSE UR STRIP-MCNC: NEGATIVE MG/DL
HCT VFR BLD AUTO: 22 % (ref 40–53)
HEMOCCULT STL QL: NEGATIVE
HGB BLD-MCNC: 7.2 G/DL (ref 13.3–17.7)
HGB UR QL STRIP: ABNORMAL
HOLD SPECIMEN: NORMAL
IMM GRANULOCYTES # BLD: 0 10E3/UL
IMM GRANULOCYTES NFR BLD: 0 %
INR PPP: 1.09 (ref 0.85–1.15)
ISSUE DATE AND TIME: NORMAL
KETONES UR STRIP-MCNC: NEGATIVE MG/DL
LDH SERPL L TO P-CCNC: 188 U/L (ref 0–250)
LEUKOCYTE ESTERASE UR QL STRIP: NEGATIVE
LYMPHOCYTES # BLD AUTO: 1.8 10E3/UL (ref 0.8–5.3)
LYMPHOCYTES NFR BLD AUTO: 35 %
MCH RBC QN AUTO: 32.9 PG (ref 26.5–33)
MCHC RBC AUTO-ENTMCNC: 32.7 G/DL (ref 31.5–36.5)
MCV RBC AUTO: 101 FL (ref 78–100)
MONOCYTES # BLD AUTO: 0.6 10E3/UL (ref 0–1.3)
MONOCYTES NFR BLD AUTO: 12 %
MUCOUS THREADS #/AREA URNS LPF: PRESENT /LPF
NEUTROPHILS # BLD AUTO: 2.4 10E3/UL (ref 1.6–8.3)
NEUTROPHILS NFR BLD AUTO: 47 %
NITRATE UR QL: NEGATIVE
NRBC # BLD AUTO: 0 10E3/UL
NRBC BLD AUTO-RTO: 0 /100
PH UR STRIP: 6.5 [PH] (ref 5–7)
PLATELET # BLD AUTO: 153 10E3/UL (ref 150–450)
POTASSIUM SERPL-SCNC: 3.7 MMOL/L (ref 3.4–5.3)
PROT SERPL-MCNC: 6.8 G/DL (ref 6.4–8.3)
PROT/CREAT 24H UR: 0.75 MG/MG CR (ref 0–0.2)
RBC # BLD AUTO: 2.19 10E6/UL (ref 4.4–5.9)
RBC URINE: 1 /HPF
SODIUM SERPL-SCNC: 138 MMOL/L (ref 136–145)
SP GR UR STRIP: 1.01 (ref 1–1.03)
SPECIMEN EXPIRATION DATE: NORMAL
SPECIMEN EXPIRATION DATE: NORMAL
UNIT ABO/RH: NORMAL
UNIT NUMBER: NORMAL
UNIT STATUS: NORMAL
UNIT TYPE ISBT: 9500
UROBILINOGEN UR STRIP-MCNC: NORMAL MG/DL
WBC # BLD AUTO: 5.1 10E3/UL (ref 4–11)
WBC URINE: 4 /HPF

## 2023-04-27 PROCEDURE — 85730 THROMBOPLASTIN TIME PARTIAL: CPT | Performed by: INTERNAL MEDICINE

## 2023-04-27 PROCEDURE — 87389 HIV-1 AG W/HIV-1&-2 AB AG IA: CPT | Performed by: INTERNAL MEDICINE

## 2023-04-27 PROCEDURE — 99232 SBSQ HOSP IP/OBS MODERATE 35: CPT | Performed by: INTERNAL MEDICINE

## 2023-04-27 PROCEDURE — 84165 PROTEIN E-PHORESIS SERUM: CPT | Mod: 26

## 2023-04-27 PROCEDURE — 86880 COOMBS TEST DIRECT: CPT | Performed by: INTERNAL MEDICINE

## 2023-04-27 PROCEDURE — 86334 IMMUNOFIX E-PHORESIS SERUM: CPT | Mod: 26

## 2023-04-27 PROCEDURE — 258N000003 HC RX IP 258 OP 636: Performed by: INTERNAL MEDICINE

## 2023-04-27 PROCEDURE — 36415 COLL VENOUS BLD VENIPUNCTURE: CPT | Performed by: INTERNAL MEDICINE

## 2023-04-27 PROCEDURE — 86334 IMMUNOFIX E-PHORESIS SERUM: CPT | Performed by: PATHOLOGY

## 2023-04-27 PROCEDURE — 83521 IG LIGHT CHAINS FREE EACH: CPT | Performed by: INTERNAL MEDICINE

## 2023-04-27 PROCEDURE — 86803 HEPATITIS C AB TEST: CPT | Performed by: INTERNAL MEDICINE

## 2023-04-27 PROCEDURE — 250N000013 HC RX MED GY IP 250 OP 250 PS 637: Performed by: INTERNAL MEDICINE

## 2023-04-27 PROCEDURE — 80053 COMPREHEN METABOLIC PANEL: CPT | Performed by: INTERNAL MEDICINE

## 2023-04-27 PROCEDURE — 85384 FIBRINOGEN ACTIVITY: CPT | Performed by: INTERNAL MEDICINE

## 2023-04-27 PROCEDURE — 84156 ASSAY OF PROTEIN URINE: CPT | Performed by: INTERNAL MEDICINE

## 2023-04-27 PROCEDURE — 120N000001 HC R&B MED SURG/OB

## 2023-04-27 PROCEDURE — 99233 SBSQ HOSP IP/OBS HIGH 50: CPT | Performed by: INTERNAL MEDICINE

## 2023-04-27 PROCEDURE — 85025 COMPLETE CBC W/AUTO DIFF WBC: CPT | Performed by: INTERNAL MEDICINE

## 2023-04-27 PROCEDURE — 87340 HEPATITIS B SURFACE AG IA: CPT | Performed by: INTERNAL MEDICINE

## 2023-04-27 PROCEDURE — 82272 OCCULT BLD FECES 1-3 TESTS: CPT | Performed by: INTERNAL MEDICINE

## 2023-04-27 PROCEDURE — 84155 ASSAY OF PROTEIN SERUM: CPT | Performed by: INTERNAL MEDICINE

## 2023-04-27 PROCEDURE — 86923 COMPATIBILITY TEST ELECTRIC: CPT | Performed by: INTERNAL MEDICINE

## 2023-04-27 PROCEDURE — 84165 PROTEIN E-PHORESIS SERUM: CPT | Mod: TC | Performed by: PATHOLOGY

## 2023-04-27 PROCEDURE — P9016 RBC LEUKOCYTES REDUCED: HCPCS | Performed by: INTERNAL MEDICINE

## 2023-04-27 PROCEDURE — 86706 HEP B SURFACE ANTIBODY: CPT | Performed by: INTERNAL MEDICINE

## 2023-04-27 PROCEDURE — 99254 IP/OBS CNSLTJ NEW/EST MOD 60: CPT | Performed by: INTERNAL MEDICINE

## 2023-04-27 PROCEDURE — 82728 ASSAY OF FERRITIN: CPT | Performed by: INTERNAL MEDICINE

## 2023-04-27 PROCEDURE — 82784 ASSAY IGA/IGD/IGG/IGM EACH: CPT | Performed by: INTERNAL MEDICINE

## 2023-04-27 PROCEDURE — 83010 ASSAY OF HAPTOGLOBIN QUANT: CPT | Performed by: INTERNAL MEDICINE

## 2023-04-27 PROCEDURE — 82248 BILIRUBIN DIRECT: CPT | Performed by: INTERNAL MEDICINE

## 2023-04-27 PROCEDURE — 85610 PROTHROMBIN TIME: CPT | Performed by: INTERNAL MEDICINE

## 2023-04-27 PROCEDURE — 83615 LACTATE (LD) (LDH) ENZYME: CPT | Performed by: INTERNAL MEDICINE

## 2023-04-27 PROCEDURE — 81001 URINALYSIS AUTO W/SCOPE: CPT | Performed by: INTERNAL MEDICINE

## 2023-04-27 PROCEDURE — 86901 BLOOD TYPING SEROLOGIC RH(D): CPT | Performed by: INTERNAL MEDICINE

## 2023-04-27 RX ORDER — ROSUVASTATIN CALCIUM 10 MG/1
10 TABLET, COATED ORAL DAILY
Status: DISCONTINUED | OUTPATIENT
Start: 2023-04-28 | End: 2023-04-28 | Stop reason: HOSPADM

## 2023-04-27 RX ORDER — PYRIDOXINE HCL (VITAMIN B6) 50 MG
50 TABLET ORAL 3 TIMES DAILY
Status: DISCONTINUED | OUTPATIENT
Start: 2023-04-27 | End: 2023-04-28 | Stop reason: HOSPADM

## 2023-04-27 RX ADMIN — THIAMINE HCL TAB 100 MG 100 MG: 100 TAB at 08:18

## 2023-04-27 RX ADMIN — MULTIPLE VITAMINS W/ MINERALS TAB 1 TABLET: TAB at 08:18

## 2023-04-27 RX ADMIN — SODIUM CHLORIDE: 9 INJECTION, SOLUTION INTRAVENOUS at 15:20

## 2023-04-27 RX ADMIN — ROSUVASTATIN CALCIUM 40 MG: 20 TABLET, FILM COATED ORAL at 08:18

## 2023-04-27 RX ADMIN — Medication 50 MG: at 15:33

## 2023-04-27 RX ADMIN — Medication 50 MG: at 21:12

## 2023-04-27 RX ADMIN — FOLIC ACID 1 MG: 1 TABLET ORAL at 08:18

## 2023-04-27 RX ADMIN — SODIUM CHLORIDE: 9 INJECTION, SOLUTION INTRAVENOUS at 02:12

## 2023-04-27 ASSESSMENT — ACTIVITIES OF DAILY LIVING (ADL)
ADLS_ACUITY_SCORE: 20

## 2023-04-27 NOTE — CONSULTS
GASTROENTEROLOGY CONSULTATION      Grey Candelario  [address] [address]  57 year old male     Admission Date/Time: 4/25/2023  Primary Care Provider: Santosh Ortega     We were asked to see the patient in consultation by Dr. Carnes for evaluation of anemia.    ASSESSMENT:    1.  Anemia- based on the iron studies, it does not appear to be consistent with iron deficiency anemia and the patient has no overt GI bleeding.  He does have other potential etiologies for anemia including renal insufficiency.  Agree with plans for hematology consultation.  No acute need for endoscopic evaluation at this time.  He did have full evaluation with upper endoscopy and colonoscopy in 2021.    2.  Bloating and abdominal distention- patient has had an extensive work-up for this, if this is continuing to bother the patient, he could follow-up with his primary [facility] provider, Dr. Rivera as an outpatient for any further work-up or treatment.    RECOMMENDATIONS:  - Await hematology work-up  - No acute need for gastrointestinal evaluation of the anemia, given lack of overt GI bleeding and no iron deficiency on initial labs.  - If there is a strong suspicion for an iron deficiency anemia of GI origin or significant overt GI bleeding, please consult us in the future.  - We will sign off at this time and defer ongoing management to hospitalist service, hematology and nephrology.    Sarkis Love MD   [facility]  [phone]      ________________________________________________________________________        HPI:  Grey Candelario is a 57 year old male who has a history of tobacco use, hypertension, coronary disease with cardiac stent placement 2018, hyperlipidemia, atherosclerosis arteriosclerosis obliterans, aortic stenosis.  He presents with acute on chronic renal failure.  He was sent in by his nephrologist.  Patient does have an anemia with a hemoglobin of 8.5.  Iron studies are normal,  ferritin pending.  Hemoglobin to drop to 7.2 during this hospitalization.  Of note, his hemoglobin has ranged from 8-11 in fall 2022.  In October 2022 his hemoglobin is 8.4, in November 2022 was 11.7.  Patient does report chronic abdominal distention, see work-up below he has been followed in our clinic for this, last seen in June 2022.  He denies any nausea, vomiting.  He denies any significant reflux.  There is no dysphagia.  He denies any melena or hematochezia.  He had a brown stool today.  He does occasionally get nausea at the site of food, however, this can be rare with many months in between.  He does report that he had a weight gain of 20 pounds over the last couple years and mild weight loss over the last week or so.  No fever.  He was taking some tablets of ibuprofen, but stopped a month ago.     Previous GI work-up as detailed below.  The patient has been seen in our clinic since 2021 for abdominal distention, initially by Dr. Vigil and most recently on his last visit in June 2022 by Dr. Rivera.    Imaging  - CT scan of the chest, abdomen and pelvis with IV contrast, January 2021: Scattered nodular opacities in the lung, recommended follow-up exam in 3 months.  No abdominal source for his pain or symptoms.  There was bladder wall thickening.  Diverticulosis.  - CTA of the abdomen and extremities, January 2022: Only mild stenosis at the celiac artery.  Mild stenosis of the superior mesenteric artery.  No stenosis at the inferior mesenteric artery.    Endoscopies  - Upper endoscopy, August 2021: Normal esophagus, erosive gastritis, duodenal erosion.  Gastric biopsy normal.  Duodenal biopsy showed regenerative changes.  - Colonoscopy, August 2021: 8 polyps removed, 4 of these were adenomatous, 2 of these were over 10 cm.  Diverticulosis.  Hemorrhoids.    Other  - Fructose breath test negative.  - Glucose breath test positive, November 2021, possibly indicating small bowel intestinal bacterial overgrowth.   "There was discussion for Cipro at that time.        ROS: A comprehensive ten point review of systems was negative aside from those in mentioned in the HPI.      PAST MEDICAL HISTORY:  Past Medical History:   Diagnosis Date     Current smoker      HTN (hypertension)      Hx of heart artery stent     2018 2 stents placed     Hyperlipidemia      PAST SURGICAL HISTORY:  Past Surgical History:   Procedure Laterality Date     IR LOWER EXTREMITY ANGIOGRAM BILATERAL  11/14/2019     OTHER SURGICAL HISTORY      hip surgery , hardware in place.     SOCIAL HISTORY:  Social History     Tobacco Use     Smoking status: Every Day     Packs/day: 1.00     Types: Cigarettes     Smokeless tobacco: Never   Substance Use Topics     Alcohol use: Yes     Drug use: No     FAMILY HISTORY:  No family history on file.  ALLERGIES:   Allergies   Allergen Reactions     Morphine Hcl Hives     MEDICATIONS:   Prior to Admission medications    Medication Sig Start Date End Date Taking? Authorizing Provider   losartan-hydrochlorothiazide (HYZAAR) 100-25 MG tablet Take 1 tablet by mouth daily   Yes Unknown, Entered By History   rosuvastatin (CRESTOR) 40 MG tablet TAKE 1 TABLET BY MOUTH ONE TIME DAILY 10/28/19  Yes Ernestine Posada PA-C     PHYSICAL EXAM:   /77   Pulse 81   Temp 98.3  F (36.8  C) (Oral)   Resp 18   Ht 1.778 m (5' 10\")   Wt 77.6 kg (171 lb)   SpO2 98%   BMI 24.54 kg/m     GEN: Alert, NAD.  KESHIA: AT, anicteric, OP without erythema, exudate, or ulcers.    LYMPH: No LAD noted.  HRT: RRR, no PATY  LUNGS: CTA  ABD: +BS, non-tender, mildly distended, no rebound or guarding.  SKIN: No rash, jaundice   NEURO: MS intact       ADDITIONAL DATA:   I reviewed the patient's new clinical lab test results.   Recent Labs   Lab Test 04/27/23  0803 04/26/23  1507 04/25/23  1521 11/14/19  1305 11/16/18  1120   WBC 5.1  --  6.7 8.7 7.8   HGB 7.2*  --  8.5* 14.0 13.4   *  --  99 97 99     --  201 242 260   INR  --  1.06  --  0.94 " 0.85*     Recent Labs   Lab Test 04/27/23  0803 04/26/23  0546 04/25/23  2000 04/25/23  1521    139  --  135*   POTASSIUM 3.7 4.2  --  4.4   CHLORIDE 110* 108*  --  98   CO2 19* 22  --  23   BUN 27.6* 36.2*  --  39.3*   CR 3.03* 3.42*  --  3.61*   ANIONGAP 9 9  --  14   MARY 8.9 9.0  --  9.9   GLC 93 100* 83 109*     Recent Labs   Lab Test 04/27/23  0614 04/25/23  1521 04/25/23  1517 11/10/17  0000   ALBUMIN  --  4.4  --   --    BILITOTAL  --  0.4  --   --    ALT  --  29  --  11   AST  --  34  --  22   PROTEIN 50*  --  100*  --    LIPASE  --  81*  --   --         I reviewed the patient's new imaging results.     EXAM: CT ABDOMEN PELVIS W/O CONTRAST  LOCATION: [facility]  DATE/TIME: 4/25/2023 8:58 PM CDT     INDICATION: Abdominal distention, increased lipase, anorexia, new renal insufficiency.     COMPARISON: 01/22/2021  TECHNIQUE: CT scan of the abdomen and pelvis was performed without IV contrast. Multiplanar reformats were obtained. Dose reduction techniques were used.  CONTRAST: None.     FINDINGS:   LOWER CHEST: No significant infiltrate or effusion.     HEPATOBILIARY: Normal noncontrast liver parenchyma. No calcified gallstones.     PANCREAS: Normal noncontrast appearance of the pancreas. No ductal dilation or adjacent inflammatory change.     SPLEEN: Normal.     ADRENAL GLANDS: Normal.     KIDNEYS/BLADDER: Normal noncontrast appearance of the kidneys. No nephrolithiasis or hydronephrosis. Significant renal vascular calcification noted. Ureters unremarkable. Mildly distended urinary bladder.     BOWEL: No evidence of bowel obstruction or inflammation.     LYMPH NODES: No lymphadenopathy.     VASCULATURE: Diffuse atherosclerotic vascular calcification with bilateral common-external iliac stents and postoperative change in both groins.     PELVIC ORGANS: Mild prostatic enlargement. No pelvic mass.     MUSCULOSKELETAL: Degenerative and postoperative change lumbar spine. Mild superior  endplate compression T12, new relative to 2021. No concerning lytic or sclerotic osseous lesions.                                                                      IMPRESSION:   1.  No acute abdominal/pelvic findings.     2.  Normal noncontrast appearance of the pancreas. No ductal dilation or adjacent inflammation.     3.  Aside from significant renal vascular calcification, kidneys are normal in unenhanced appearance. No hydronephrosis. Mildly distended urinary bladder and mild prostatic enlargement noted.     4.  Diffuse atherosclerotic vascular calcification with bilateral common-external iliac stents and postoperative changes in both groins.     5.  Mild superior endplate compression T12 new relative to 2021.

## 2023-04-27 NOTE — PROGRESS NOTES
[facility]    Nephrology Progress Note    Assessment & Plan      <BERNARD? ON CKD    ~Bj's renal disease has been characterized by low grade proteinuria, negative MGRS workup, no hematura or leukuria.     ~Has had minimal improvement with fluids. Has significant systemic symptoms which may be in part abut are less likely all due to his renal disease.     ~Cystatin C lower than the creatinine which suggests the creatinine is a reasonable estimate of renal function, at least is not overestimating the function.     ~Discussed his situation at length with Grey and his SO Yesenia. I said that it is possibly hypertension or atherosclerotic related renal disease but the behavior of his kidney function is somewhat unusual in the apparent rapid progression and lack of clear precipitator to this decline in renal function.     ~We went over the concern is that we do not want to sit on a reversible condition that could be treated. A biopsy also would give prognostic information. If we see a lot of scarring then we just will have to accept and deal with that.     ~Reviewed that renal biopsy is a safe procedure with a very low complication rate. It does involve using a needle to biopsy the kidney tissue which is very vascular so there is always some bleeding but rarely severe enough to require transfusion or a procedure or operation to stop the bleeding. Platelets, INR, PTT and PFC test all acceptable for doing a biopsy.     - Spoke with ultrasound and there is a spot to do the biopsy tomorrow at noon.     -Will order biopsy for tomorrow. Will make NPO after midnight. Only minimal sedation needed.     Tevin Lenz MD  Nephrology  [facility]  Cell:[phone]  [employer]   Pager:[phone]            .........    Interval History     Feeling better in terms of appetite. Actually hungry and wants to eat.    Got fluids overnight and is getting blood now for severe anemia.     Creatinine  did not improve much, only got down to 3.     UPCR confirms tubulointerstitial range proteinuria is present without hematuria.       Temp: 98.9  F (37.2  C) Temp src: Oral BP: 130/67 Pulse: 85   Resp: 18 SpO2: 98 % O2 Device: None (Room air)      Vitals:    04/25/23 1514   Weight: 77.6 kg (171 lb)       Vital Signs with Ranges    Temp:  [98  F (36.7  C)-98.9  F (37.2  C)] 98.9  F (37.2  C)  Pulse:  [77-89] 85  Resp:  [16-18] 18  BP: (105-136)/(53-67) 130/67  SpO2:  [97 %-100 %] 98 %    I/O last 3 completed shifts:  In: 3193 [I.V.:3193]  Out: -     Physical Exam    BP Readings from Last 5 Encounters:   04/27/23 130/67   12/19/19 (!) 146/78   11/15/19 (!) 158/79   10/31/19 (!) 149/66   06/06/19 143/74        Wt Readings from Last 10 Encounters:   04/25/23 77.6 kg (171 lb)   11/14/19 77.8 kg (171 lb 9.6 oz)   06/06/19 72.1 kg (159 lb)   11/16/18 72.3 kg (159 lb 8 oz)   08/10/18 70.3 kg (155 lb)   03/23/18 70.3 kg (155 lb)   03/08/18 70.3 kg (155 lb)   07/21/13 72.6 kg (160 lb)     GENERAL: Alert, in no apparent distress. More energetic appearing than yesterday.   HEENT:  Normocephalic. No gross abnormalities.  The mouth moist.    Neck The jugular venous pressure is not elevated  CV: RRR. No change in his murmur.  RESP: Breathing unlabored. No rales.   GI: Abdomen non-distended  MUSCULOSKELETAL: extremities nl - no gross deformities noted. No edema  SKIN: no new lesions or rashes, dry to touch. Remains pale.  NEURO:  No overt deficit.  PSYCH: mood neutral. Affect appropriate.         Medications       sodium chloride 100 mL/hr at 04/27/23 0212         folic acid  1 mg Oral Daily     multivitamin w/minerals  1 tablet Oral Daily     [START ON 4/28/2023] rosuvastatin  10 mg Oral Daily     sodium chloride (PF)  3 mL Intracatheter Q8H     thiamine  100 mg Oral Daily       Data     UA RESULTS:  Recent Labs   Lab Test 04/27/23  0614   COLOR Straw   APPEARANCE Clear   URINEGLC Negative   URINEBILI Negative   URINEKETONE  Negative   SG 1.009   UBLD Small*   URINEPH 6.5   PROTEIN 50*   NITRITE Negative   LEUKEST Negative   RBCU 1   WBCU 4      BMP  Recent Labs   Lab 04/27/23  0803 04/26/23  0546 04/25/23  2000 04/25/23  1521    139  --  135*   POTASSIUM 3.7 4.2  --  4.4   CHLORIDE 110* 108*  --  98   MARY 8.9 9.0  --  9.9   CO2 19* 22  --  23   BUN 27.6* 36.2*  --  39.3*   CR 3.03* 3.42*  --  3.61*   GLC 93 100* 83 109*     Phos@[email or web address](phos:4)  CBC)  Recent Labs   Lab 04/27/23  0803 04/25/23  1521   WBC 5.1 6.7   HGB 7.2* 8.5*   HCT 22.0* 25.7*   * 99    201     Lab Results   Component Value Date    ALBUMIN 4.4 04/25/2023        Recent Labs   Lab 04/27/23  0803 04/26/23  1507 04/26/23  0546   HGB 7.2*  --   --    HCT 22.0*  --   --    *  --   --    IRON  --  64  --    IRONSAT  --  32  --    FEB  --  203*  --    B12  --   --  638   FOLIC  --  32.3  --        Recent Labs   Lab 04/25/23  1521   AST 34   ALT 29   ALKPHOS 89   BILITOTAL 0.4     Recent Labs   Lab 04/26/23  1507   INR 1.06     No results found for: D2VIT, D3VIT, DTOT  No results for input(s): PTHI in the last 168 hours.    Attestation:   I have reviewed today's relevant vital signs, notes, medications, labs and imaging.

## 2023-04-27 NOTE — PROGRESS NOTES
[facility]    Medicine Progress Note - Hospitalist Service    Date of Admission:  4/25/2023    Assessment & Plan   Grey Candelario is a 57 year old male admitted on 4/25/2023. 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 BERNARD to 2.6-2.8 in 8-10/2022 reportedly around time of contrast administration. 10/2022 creatine was 1.4-1.8.  Saw nephrology 11/2022, was to see in 3 mo but lost to follow up  *was using mild-mod amounts NSAIDS until a month ago. Having sx of nausea, excessive sleeping, fatigue and shaking. Labs were checked and pt has creatinine of 3.6. In ED afebrile, mildly tachy.   -CT a/p without contrast w/o acute findings, kidneys with significant renal calcification, normal in appearance, no hydro.   -Nephrology following, appreciate input.   -Currently on IV fluids, had minimal improvement with fluids and creatinine this morning is 3.03.  Cystatin C lower than creatinine  -Continue to hold PTA lisinopril/HCTZ and avoid any other nephrotoxins  -Plan for kidney biopsy tomorrow  -If renal function does not improve significantly, nephrology is planning on doing kidney biopsy on Friday 4/28.  Recommends that aspirin not be started in the interim   -INR PTT and PFC within normal limit    Progressive anemia  Hgb 8.4 on presentation. Hgb on October and November through [facility] was in the 11 range however looking back at his records back in October 2022 through [facility] his hemoglobin was in the 8s multiple times,?    -Patient denies any active bleeding or black tarry stool  -Iron studies, B12 and folate normal  -Hemoglobin dropped even more today with hydration and is 7.2 today, Hemoccult stool negative, CT abdomen pelvis with no obvious intra-abdominal bleed  -Patient has seen [facility] previously however has not had recent endoscopy, [facility] consulted  -Also given no  obvious blood loss and negative Hemoccult will consult hematology  -Cardiology recommended keeping hemoglobin more than 8(see consult note for details), will order type and cross and transfusion, consent taken     Aortic stenosis 2/2022   HTN  HLD  Progressive dyspnea on exertion  *Stress echo in 2/2022 with EF 65-75%, severe aortic stenosis, valve area 1.0, gradient 36 mm Hg, nl LV size  *c/o significant progressive SOB with exertion. Doesn't appear volume overloaded on admission  -However echocardiogram done here with EF of 60-65%, mild to moderate valvular aortic stenosis, no regional wall motion abnormality  -Cardiology evaluated the patient, did not think his dyspnea was related to his aortic stenosis or any cardiac etiology, recommended keeping hemoglobin more than 8  -Outpatient follow-up with Dr. Luna as previously scheduled on 5/5/2023  -Hold PTA Crestor given muscle cramps     Lower extremity cramps  -Likely multifactorial in the setting of PAD, BERNARD, history of lumbar decompression/spinal fusion  -Iron studies, magnesium and CK level unremarkable  -Will give a trial of pyridoxine to see if it helps with his leg cramps    Alcohol use disorder  Forthcoming that he is alcoholic, drinks 4-5 vodka drinks a day. No hx withdrawal in the past.   - CIWA 1-5  - prn lorazepam  -Continue with oral vitamins     PAD  Atherosclerosis arteriosclerosis obliterans  S/p bilateral common external iliac stenting in 2018 ultimately needing R iliofemoral bypass and L femoral endarterectomy/ patch angioplasty 2/2022.   Not currently on antiplatelet or anticoagulant for this     Chronic low back pain  Hx lumbar decompression  Hx spinal fusion ~20 yrs ago  Not currently taking pain meds for this.        Diet: Regular Diet Adult    DVT Prophylaxis: Pneumatic Compression Devices  Ortez Catheter: Not present  Lines: None     Cardiac Monitoring: None  Code Status: Full Code      Clinically Significant Risk Factors                                  Disposition Plan      Expected Discharge Date: 2023                  Tabatha Carnes MD  Hospitalist Service  [facility]  Securely message with [employer] (more info)  Text page via [employer] Paging/Directory   ______________________________________________________________________    Interval History   Patient with no new complaints.  Wants to be discharged.  Denies any loss of blood updated him about the reason to stay in the hospital.  No other nursing concerns.    Physical Exam   Vital Signs: Temp: 98.8  F (37.1  C) Temp src: Oral BP: 107/63 Pulse: 85   Resp: 18 SpO2: 97 % O2 Device: None (Room air)    Weight: 171 lbs 0 oz    Exam:  Constitutional: Awake, alert and no distress. Appears comfortable  Head: Normocephalic. No masses, lesions, tenderness or abnormalities  ENT: ENT exam normal, no neck nodes or sinus tenderness  Cardiovascular: RRR.  2+ murmurs, no rubs or JVD  Respiratory: Normal WOB,b/l equal air entry, no wheezes or crackles   Gastrointestinal: Abdomen soft, non-tender. BS normal. No masses, organomegaly  : Deferred  Extremities : No edema , no clubbing or cyanosis          Medical Decision Making       52 MINUTES SPENT BY ME on the date of service doing chart review, history, exam, documentation & further activities per the note.      Data     I have personally reviewed the following data over the past 24 hrs:    5.1  \   7.2 (L)   / 153     138 110 (H) 27.6 (H) /  93   3.7 19 (L) 3.03 (H) \       INR:  1.06 PTT:  30   D-dimer:  N/A Fibrinogen:  N/A       Imaging results reviewed over the past 24 hrs:   Recent Results (from the past 24 hour(s))   Echocardiogram Complete   Result Value    LVEF  60-65%    Narrative    [id number]  [id number]  [id number]  [id number]^TALITA^TIBURCIO^SHANTHI     [facility]  Echocardiography Laboratory  [address] [place] [address]     Name: MARY LOU OWENS  MRN: [id number]  : 1965  Study Date: 2023  09:49 AM  Age: 57 yrs  Gender: Male  Patient Location: [facility]  Reason For Study: Aortic Valve Disorder  Ordering Physician: TIBURCIO SANON  Referring Physician: TIBURCIO SANON  Performed By: Masood Ortiz     BSA: 2.0 m2  Height: 70 in  Weight: 171 lb  HR: 84  BP: 129/58 mmHg  ______________________________________________________________________________  Procedure  Complete Echo Adult. Optison (NDC #[id number]) given intravenously.  ______________________________________________________________________________  Interpretation Summary     Left ventricular systolic function is normal.  The visual ejection fraction is 60-65%.  No regional wall motion abnormalities noted.  AV not werll visualzied, bicuspir aortic valve not excluded.  Mild to moderate valvular aortic stenosis.  The mean AoV pressure gradient is 19mmHg.  The study was technically adequate. There is no comparison study available.  ______________________________________________________________________________  Left Ventricle  The left ventricle is normal in size. There is normal left ventricular wall  thickness. Left ventricular systolic function is normal. The visual ejection  fraction is 60-65%. Left ventricular diastolic function is indeterminate. No  regional wall motion abnormalities noted.     Right Ventricle  The right ventricle is normal size. The right ventricular systolic function is  normal.     Atria  Normal left atrial size. Right atrial size is normal.     Mitral Valve  There is mild mitral annular calcification. There is trace mitral  regurgitation.     Tricuspid Valve  No tricuspid regurgitation. Right ventricular systolic pressure could not be  approximated due to inadequate tricuspid regurgitation. IVC diameter <2.1 cm  collapsing >50% with sniff suggests a normal RA pressure of 3 mmHg.     Aortic Valve  The aortic valve is not well visualized. It is moderately thickened and  calcified. Mild to moderate valvular aortic  stenosis. The mean AoV pressure  gradient is 19mmHg.     Pulmonic Valve  The pulmonic valve is not well seen, but is grossly normal.     Vessels  The aortic root is normal size.     Pericardium  There is no pericardial effusion.     Rhythm  Sinus rhythm was noted.  ______________________________________________________________________________  MMode/2D Measurements & Calculations  IVSd: 1.0 cm     LVIDd: 4.2 cm  LVIDs: 2.7 cm  LVPWd: 1.0 cm  FS: 35.7 %  LV mass(C)d: 137.2 grams  LV mass(C)dI: 70.3 grams/m2  Ao root diam: 3.3 cm  LA dimension: 3.7 cm  asc Aorta Diam: 3.2 cm  LA/Ao: 1.1  LVOT diam: 2.3 cm  LVOT area: 4.0 cm2  LA Volume (BP): 41.6 ml  LA Volume Index (BP): 21.3 ml/m2  RV Base: 3.1 cm  RWT: 0.48     TAPSE: 1.8 cm     Doppler Measurements & Calculations  MV E max smooth: 121.0 cm/sec  MV A max smooth: 135.0 cm/sec  MV E/A: 0.90  MV dec time: 0.22 sec  Ao V2 max: 288.7 cm/sec  Ao max P.0 mmHg  Ao V2 mean: 205.3 cm/sec  Ao mean P.0 mmHg  Ao V2 VTI: 62.6 cm  ARACELIS(I,D): 1.4 cm2  ARACELIS(V,D): 1.4 cm2  LV V1 max PG: 3.8 mmHg  LV V1 max: 98.0 cm/sec  LV V1 VTI: 22.0 cm  SV(LVOT): 88.8 ml  SI(LVOT): 45.5 ml/m2  PA acc time: 0.12 sec  AV Smooth Ratio (DI): 0.34  ARACELIS Index (cm2/m2): 0.73  E/E' avg: 15.4  Lateral E/e': 13.7  Medial E/e': 17.1  RV S Smooth: 16.5 cm/sec     ______________________________________________________________________________  Report approved by: Carmina Downey 2023 11:12 AM           Recent Labs   Lab 23  0803 23  1507 23  0546 23  2000 23  1521   WBC 5.1  --   --   --  6.7   HGB 7.2*  --   --   --  8.5*   *  --   --   --  99     --   --   --  201   INR  --  1.06  --   --   --      --  139  --  135*   POTASSIUM 3.7  --  4.2  --  4.4   CHLORIDE 110*  --  108*  --  98   CO2 19*  --  22  --  23   BUN 27.6*  --  36.2*  --  39.3*   CR 3.03*  --  3.42*  --  3.61*   ANIONGAP 9  --  9  --  14   MARY 8.9  --  9.0  --  9.9   GLC 93  --  100* 83 109*    ALBUMIN  --   --   --   --  4.4   PROTTOTAL  --   --   --   --  8.2   BILITOTAL  --   --   --   --  0.4   ALKPHOS  --   --   --   --  89   ALT  --   --   --   --  29   AST  --   --   --   --  34   LIPASE  --   --   --   --  81*

## 2023-04-27 NOTE — CONSULTS
[facility]    Cardiology Consultation     Date of Admission:  4/25/2023    Assessment & Plan   Grey Candelario is a 57 year old male who was admitted on 4/25/2023.    1.  Moderate aortic stenosis- mean 26 mmHg, ARACELIS 1.4 cm   2.  Severe peripheral vascular disease status post iliac stent and femoropopliteal bypass  3.  Acute on chronic renal failure-nephrology on board  4.  Chronic anemia- likely from renal failure  5.  Essential hypertension  6.  Former smoker    Recommendation:   1.  I do not believe patient's aortic stenosis is severe enough to be causing his current symptoms.  There is a discrepancy between the measurements we have obtained on echocardiogram here versus that reported on repeat event stress echocardiogram at [facility] [ARACELIS 1 cm , mean gradient 36 mmHg].  As stated below, it is not clear whether the measurements obtained during dobutamine stress where before or after infusion of dobutamine.  In any case, this does not need any further work-up at this time and be reassessed with repeat echocardiograms or down the road.  His current symptoms are most likely a result of acute on chronic renal failure as well as worsening of anemia.  Hemoglobin has dropped 4-5 points since October which is definitely contributing to his symptoms.  He is already scheduled to see Dr. Luna in clinic 5/5/2023 and I have asked him to keep that appointment.    2.  Due to history of severe peripheral vascular disease I will not be surprised if he also has some coronary artery disease.  However, fortunately he is asymptomatic from that standpoint on limited exertion he does.  Would recommend keeping hemoglobin above 8.  Apart from this, I do not have any other recommendations from cardiac standpoint.  We will follow him peripherally.  Please call us with questions.      Moderate complexity     Erwin Mathis MD, MD    Primary Care Physician   Santosh Ortega    Reason for Consult   Reason for  consult: I was asked to see patient for history of aortic stenosis.     History of Present Illness   Grey Candelario is a 57 year old male with past medical history of peripheral vascular disease, hypertension, hyperlipidemia presents to the hospital for chronic fatigue and generalized malaise for the last many weeks.    The patient is followed at [facility].  He has severe peripheral vascular disease and and 02/2/2022 had a dobutamine stress echocardiogram for preoperative cardiovascular stratification before undergoing femoropopliteal bypass.  The stress echocardiogram reports BAV with severe aortic stenosis (reported aortic valve area of 1 cm , mean gradient 36 mmHg).  It is unclear to me if these measurements were prior to dobutamine infusion or after.  I do not have any resting echocardiogram.  Request was made for the patient to see cardiology but this did not happen.  The patient then had a back surgery in our system and 10/2022.  There is no echocardiogram from around that.  The last echocardiogram that in the system is from 2017 which reports normal aortic valve gradients.  He did have left renal failure as well as anemia.  As far as I can tell him the first incidence of renal failure occurred around the time of vascular surgery and his renal function has waxed and waned since.  Hemoglobin was 12 around the time of lower back surgery in 10/2022 and is now down to 8.5.    He now reports feeling generalized fatigue and becomes tired on exertion very easily.  This has been going on for the last few months.  Another symptom he reports is abdominal fullness and discomfort.  Denies chest pain, chest tightness, orthopnea, PND, lower extremity edema, presyncope or syncope.    In the hospital, blood pressures creatinine of 3.4, GFR of 20, hemoglobin 8.5, platelet 201, normal EKG.  Transthoracic echocardiogram reports aortic valve area of 1.4 cm  with a mean gradient of 26 mmHg.  LV systolic function is  normal.    Past Medical History   Past Medical History:   Diagnosis Date     Current smoker      HTN (hypertension)      Hx of heart artery stent     2018 2 stents placed     Hyperlipidemia          Past Surgical History   Past Surgical History:   Procedure Laterality Date     IR LOWER EXTREMITY ANGIOGRAM BILATERAL  11/14/2019     OTHER SURGICAL HISTORY      hip surgery , hardware in place.         Prior to Admission Medications   Prior to Admission Medications   Prescriptions Last Dose Informant Patient Reported? Taking?   losartan-hydrochlorothiazide (HYZAAR) 100-25 MG tablet 4/25/2023 at am Self Yes Yes   Sig: Take 1 tablet by mouth daily   rosuvastatin (CRESTOR) 40 MG tablet 4/25/2023 at am Self No Yes   Sig: TAKE 1 TABLET BY MOUTH ONE TIME DAILY      Facility-Administered Medications: None     Current Facility-Administered Medications   Medication Dose Route Frequency     folic acid  1 mg Oral Daily     multivitamin w/minerals  1 tablet Oral Daily     rosuvastatin  40 mg Oral Daily     sodium chloride (PF)  3 mL Intracatheter Q8H     thiamine  100 mg Oral Daily     Current Facility-Administered Medications   Medication Last Rate     sodium chloride 100 mL/hr at 04/26/23 1633     Allergies   Allergies   Allergen Reactions     Morphine Hcl Hives       Social History    reports that he has been smoking. He has been smoking an average of 1 pack per day. He has never used smokeless tobacco. He reports current alcohol use. He reports that he does not use drugs.      Family History     No family history of bicuspid aortic valve and spirits.       Review of Systems   A comprehensive review of system was performed and is negative other than that noted in the HPI or here.     Physical Exam   Vital Signs with Ranges  Temp:  [98.3  F (36.8  C)] 98.3  F (36.8  C)  Pulse:  [73-99] 77  Resp:  [18] 18  BP: ()/(41-90) 136/63  SpO2:  [97 %-100 %] 97 %  Wt Readings from Last 4 Encounters:   04/25/23 77.6 kg (171 lb)  "  11/14/19 77.8 kg (171 lb 9.6 oz)   06/06/19 72.1 kg (159 lb)   11/16/18 72.3 kg (159 lb 8 oz)     No intake/output data recorded.      Vitals: /63 (BP Location: Left arm)   Pulse 77   Temp 98.3  F (36.8  C) (Oral)   Resp 18   Ht 1.778 m (5' 10\")   Wt 77.6 kg (171 lb)   SpO2 97%   BMI 24.54 kg/m      Physical Exam:   General - Alert and oriented to time place and person in no acute distress  Eyes - No scleral icterus  HEENT - Neck supple, moist mucous membranes  Cardiovascular -normal S1, soft S2, late peaking systolic murmur best heard at the aortic area  Extremities - There is no edema  Respiratory -bilateral equal breath sounds  Skin - No pallor or cyanosis  Gastrointestinal - Non tender and non distended without rebound or guarding  Psych - Appropriate affect   Neurological - No gross motor neurological focal deficits    No lab results found in last 7 days.    Invalid input(s): TROPONINIES    Recent Labs   Lab 04/26/23  1507 04/26/23  0546 04/25/23 2000 04/25/23  1521   WBC  --   --   --  6.7   HGB  --   --   --  8.5*   MCV  --   --   --  99   PLT  --   --   --  201   INR 1.06  --   --   --    NA  --  139  --  135*   POTASSIUM  --  4.2  --  4.4   CHLORIDE  --  108*  --  98   CO2  --  22  --  23   BUN  --  36.2*  --  39.3*   CR  --  3.42*  --  3.61*   GFRESTIMATED  --  20*  --  19*   ANIONGAP  --  9  --  14   MARY  --  9.0  --  9.9   GLC  --  100* 83 109*   ALBUMIN  --   --   --  4.4   PROTTOTAL  --   --   --  8.2   BILITOTAL  --   --   --  0.4   ALKPHOS  --   --   --  89   ALT  --   --   --  29   AST  --   --   --  34   LIPASE  --   --   --  81*     Recent Labs   Lab Test 10/04/22  1622 11/14/19  1305 11/16/18  1120   CHOL  --  184 255*   HDL  --  79 96   LDL  --  90 144*   TRIG 98 77 76     Recent Labs   Lab 04/26/23  1507 04/26/23  0546 04/25/23  1521   WBC  --   --  6.7   HGB  --   --  8.5*   HCT  --   --  25.7*   MCV  --   --  99   PLT  --   --  201   IRON 64  --   --    IRONSAT 32  --   --  "   *  --   --    B12  --  638  --    FOLIC 32.3  --   --      No results for input(s): PH, PHV, PO2, PO2V, SAT, PCO2, PCO2V, HCO3, HCO3V in the last 168 hours.  No results for input(s): NTBNPI, NTBNP in the last 168 hours.  No results for input(s): DD in the last 168 hours.  No results for input(s): SED, CRP in the last 168 hours.  Recent Labs   Lab 04/25/23  1521        No results for input(s): TSH in the last 168 hours.  Recent Labs   Lab 04/25/23  1517   COLOR Straw   APPEARANCE Clear   URINEGLC Negative   URINEBILI Negative   URINEKETONE Negative   SG 1.013   UBLD Trace*   URINEPH 7.5*   PROTEIN 100*   NITRITE Negative   LEUKEST Negative   RBCU 1   WBCU 3       Imaging:  Recent Results (from the past 48 hour(s))   CT Abdomen Pelvis w/o Contrast    Narrative    EXAM: CT ABDOMEN PELVIS W/O CONTRAST  LOCATION: [facility]  DATE/TIME: 4/25/2023 8:58 PM CDT    INDICATION: Abdominal distention, increased lipase, anorexia, new renal insufficiency.    COMPARISON: 01/22/2021  TECHNIQUE: CT scan of the abdomen and pelvis was performed without IV contrast. Multiplanar reformats were obtained. Dose reduction techniques were used.  CONTRAST: None.    FINDINGS:   LOWER CHEST: No significant infiltrate or effusion.    HEPATOBILIARY: Normal noncontrast liver parenchyma. No calcified gallstones.    PANCREAS: Normal noncontrast appearance of the pancreas. No ductal dilation or adjacent inflammatory change.    SPLEEN: Normal.    ADRENAL GLANDS: Normal.    KIDNEYS/BLADDER: Normal noncontrast appearance of the kidneys. No nephrolithiasis or hydronephrosis. Significant renal vascular calcification noted. Ureters unremarkable. Mildly distended urinary bladder.    BOWEL: No evidence of bowel obstruction or inflammation.    LYMPH NODES: No lymphadenopathy.    VASCULATURE: Diffuse atherosclerotic vascular calcification with bilateral common-external iliac stents and postoperative change in both  groins.    PELVIC ORGANS: Mild prostatic enlargement. No pelvic mass.    MUSCULOSKELETAL: Degenerative and postoperative change lumbar spine. Mild superior endplate compression T12, new relative to . No concerning lytic or sclerotic osseous lesions.      Impression    IMPRESSION:   1.  No acute abdominal/pelvic findings.    2.  Normal noncontrast appearance of the pancreas. No ductal dilation or adjacent inflammation.    3.  Aside from significant renal vascular calcification, kidneys are normal in unenhanced appearance. No hydronephrosis. Mildly distended urinary bladder and mild prostatic enlargement noted.    4.  Diffuse atherosclerotic vascular calcification with bilateral common-external iliac stents and postoperative changes in both groins.    5.  Mild superior endplate compression T12 new relative to .     Echocardiogram Complete   Result Value    LVEF  60-65%    [facility]    [id number]  [facility]  [id number]  [id number]^TALITA^TIBURCIO^SHANTHI     [facility]  Echocardiography Laboratory  [address] [place] [address]     Name: MARY LOU OWENS  MRN: [id number]  : 1965  Study Date: 2023 09:49 AM  Age: 57 yrs  Gender: Male  Patient Location: [facility]  Reason For Study: Aortic Valve Disorder  Ordering Physician: TIBURCIO SANON  Referring Physician: TIBURCIO SANON  Performed By: Masood Ortiz     BSA: 2.0 m2  Height: 70 in  Weight: 171 lb  HR: 84  BP: 129/58 mmHg  ______________________________________________________________________________  Procedure  Complete Echo Adult. Optison (NDC #[id number]) given intravenously.  ______________________________________________________________________________  Interpretation Summary     Left ventricular systolic function is normal.  The visual ejection fraction is 60-65%.  No regional wall motion abnormalities noted.  AV not werll visualzied, bicuspir aortic valve not excluded.  Mild to moderate valvular aortic  stenosis.  The mean AoV pressure gradient is 19mmHg.  The study was technically adequate. There is no comparison study available.  ______________________________________________________________________________  Left Ventricle  The left ventricle is normal in size. There is normal left ventricular wall  thickness. Left ventricular systolic function is normal. The visual ejection  fraction is 60-65%. Left ventricular diastolic function is indeterminate. No  regional wall motion abnormalities noted.     Right Ventricle  The right ventricle is normal size. The right ventricular systolic function is  normal.     Atria  Normal left atrial size. Right atrial size is normal.     Mitral Valve  There is mild mitral annular calcification. There is trace mitral  regurgitation.     Tricuspid Valve  No tricuspid regurgitation. Right ventricular systolic pressure could not be  approximated due to inadequate tricuspid regurgitation. IVC diameter <2.1 cm  collapsing >50% with sniff suggests a normal RA pressure of 3 mmHg.     Aortic Valve  The aortic valve is not well visualized. It is moderately thickened and  calcified. Mild to moderate valvular aortic stenosis. The mean AoV pressure  gradient is 19mmHg.     Pulmonic Valve  The pulmonic valve is not well seen, but is grossly normal.     Vessels  The aortic root is normal size.     Pericardium  There is no pericardial effusion.     Rhythm  Sinus rhythm was noted.  ______________________________________________________________________________  MMode/2D Measurements & Calculations  IVSd: 1.0 cm     LVIDd: 4.2 cm  LVIDs: 2.7 cm  LVPWd: 1.0 cm  FS: 35.7 %  LV mass(C)d: 137.2 grams  LV mass(C)dI: 70.3 grams/m2  Ao root diam: 3.3 cm  LA dimension: 3.7 cm  asc Aorta Diam: 3.2 cm  LA/Ao: 1.1  LVOT diam: 2.3 cm  LVOT area: 4.0 cm2  LA Volume (BP): 41.6 ml  LA Volume Index (BP): 21.3 ml/m2  RV Base: 3.1 cm  RWT: 0.48     TAPSE: 1.8 cm     Doppler Measurements & Calculations  MV E max alicia:  121.0 cm/sec  MV A max smooth: 135.0 cm/sec  MV E/A: 0.90  MV dec time: 0.22 sec  Ao V2 max: 288.7 cm/sec  Ao max P.0 mmHg  Ao V2 mean: 205.3 cm/sec  Ao mean P.0 mmHg  Ao V2 VTI: 62.6 cm  ARACELIS(I,D): 1.4 cm2  ARACELIS(V,D): 1.4 cm2  LV V1 max PG: 3.8 mmHg  LV V1 max: 98.0 cm/sec  LV V1 VTI: 22.0 cm  SV(LVOT): 88.8 ml  SI(LVOT): 45.5 ml/m2  PA acc time: 0.12 sec  AV Smooth Ratio (DI): 0.34  ARACELIS Index (cm2/m2): 0.73  E/E' avg: 15.4  Lateral E/e': 13.7  Medial E/e': 17.1  RV S Smooth: 16.5 cm/sec     ______________________________________________________________________________  Report approved by: Carmina Downey 2023 11:12 AM             Echo:  No results found for this or any previous visit (from the past 4320 hour(s)).    Clinically Significant Risk Factors Present on Admission                        Clinically Significant Risk Factors Present on Admission              Cardiovascular: Non-Rheumatic Valve Disease: Aortic valve stenosis    Fluid & Electrolyte Disorders: Mixed disorder of acid-base balance    Nephrology: Acute kidney failure, unspecified

## 2023-04-27 NOTE — PLAN OF CARE
DATE & TIME: 04/27/23, [id number]    Cognitive Concerns/ Orientation: A&Ox4   BEHAVIOR & AGGRESSION TOOL COLOR: Green  CIWA SCORE: 3, 5, 5- headache, tremors, anxious  ABNL VS/O2: VSS on RA  MOBILITY: up ad kenna  PAIN MANAGMENT: Denies  DIET: Regular, appetite improving. NPO at midnight for procedure tm  BOWEL/BLADDER: Continent  ABNL LAB/BG: Stool occult- negative. Proteinuria. Cr 3.03, improving. BUN 27.6. CO2 19. Hgb 7.2  DRAIN/DEVICES: R PIV infusing NS at 100mL/hr  TELEMETRY RHYTHM: NA  SKIN: Intact   TESTS/PROCEDURES: Blood transfusion today. 24 protein electrophoresis urine started at 1400 in progress. Renal biopsy 4/28 at noon per nephrology's note  D/C DAY/GOALS/PLACE: Possibly home tomorrow 4/28  OTHER IMPORTANT INFO: Nephrology following. Hem/onc consulted. GI consulted and signed off.

## 2023-04-27 NOTE — PLAN OF CARE
DATE & TIME: 04/26/23, 1900 - 0730    Cognitive Concerns/ Orientation: A&Ox4   BEHAVIOR & AGGRESSION TOOL COLOR: Green  CIWA SCORE: 2/1  ABNL VS/O2: VSS on RA  MOBILITY: Independent  PAIN MANAGMENT: Denied  DIET: Regular  BOWEL/BLADDER: Continent  ABNL LAB/BG: NA  DRAIN/DEVICES: Left PIV infusing at 100mL/hr  TELEMETRY RHYTHM: NA  SKIN: Dry, intact  TESTS/PROCEDURES: None  D/C DAY/GOALS/PLACE: Possibly home today 4/27  OTHER IMPORTANT INFO: Nephrology following.

## 2023-04-27 NOTE — CONSULTS
[facility]    Hematology/Oncology Consult Note      Date of Admission:  4/25/2023  Date of Consultation:  04/27/23  Reason for Consultation: Progressive anemia      ASSESSMENT/PLAN:  Grey Candelario is a 57 year old male with past medical history CKD, aortic stenosis, hypertension, hyperlipidemia, peripheral vascular disease, and chronic anemia who is admitted now with BERNARD.  Hematology is consulted for progressive anemia.    #Anemia in the setting of BERNARD/CKD  -I reviewed the patient's medical history and current clinical findings.  -Iron studies, B12, folate within normal limits.  -Given his current renal findings, I will send for serum protein electrophoresis and immunofixation, total immunoglobulins, free light chains, and 24-hour urine protein electrophoresis and immunofixation.  -Agree with renal biopsy, please send for [place] red on this specimen.  -He is s/p 1U PRBC.   -He is adamant he would like to be discharged tomorrow.  We will arrange for outpatient follow-up.  We did discuss the possible need for a bone marrow biopsy.    #History of aortic stenosis  #History of PVD  #History of HTN, HLD.     Thank you for the consultation.  Please do not hesitate to contact our team with any questions or concerns.    Joanna Kulkarni, DO  Hematology/Oncology  [facility]        HISTORY OF PRESENT ILLNESS: Grey Candelario is a 57 year old male with past medical history CKD, aortic stenosis, hypertension, hyperlipidemia, peripheral vascular disease, and chronic anemia who is admitted now with BERNARD.  Hematology is consulted for progressive anemia.    Record review shows patient has had previous history of normal hemoglobin dating back to 2019.  Acutely, hemoglobin is 7.2.  He is being transfused 1 unit packed red blood cell.  He and wife deny evidence of GI bleed.  Folate is 32.3, iron 64, TIBC 203, iron saturation 32%, and FOBT is negative.    He is being followed with nephrology  and has had minimal improvement in renal function despite IV fluids.  There is concern that possibly his renal decline is due to hypertension or atherosclerotic related renal disease, and he is to undergo renal biopsy tomorrow 4/28/2023.     In recent months, he and wife report significant decrease in energy levels.  He denies unintentional weight loss.  No gross evidence of bleed.      REVIEW OF SYSTEMS:   A 14 point ROS was reviewed with pertinent positives and negatives in the HPI.      MEDICATIONS:  Current Facility-Administered Medications   Medication     acetaminophen (TYLENOL) tablet 650 mg    Or     acetaminophen (TYLENOL) Suppository 650 mg     flumazenil (ROMAZICON) injection 0.2 mg     folic acid (FOLVITE) tablet 1 mg     OLANZapine zydis (zyPREXA) ODT tab 5-10 mg    Or     haloperidol lactate (HALDOL) injection 2.5-5 mg     lidocaine (LMX4) cream     lidocaine 1 % 0.1-1 mL     LORazepam (ATIVAN) tablet 1-2 mg    Or     LORazepam (ATIVAN) injection 1-2 mg     melatonin tablet 5 mg     multivitamin w/minerals (THERA-VIT-M) tablet 1 tablet     ondansetron (ZOFRAN ODT) ODT tab 4 mg    Or     ondansetron (ZOFRAN) injection 4 mg     pyridOXINE (VITAMIN B-6) tablet 50 mg     [START ON 4/28/2023] rosuvastatin (CRESTOR) tablet 10 mg     senna-docusate (SENOKOT-S/PERICOLACE) 8.6-50 MG per tablet 1 tablet    Or     senna-docusate (SENOKOT-S/PERICOLACE) 8.6-50 MG per tablet 2 tablet     sodium chloride (PF) 0.9% PF flush 3 mL     sodium chloride (PF) 0.9% PF flush 3 mL     sodium chloride 0.9% infusion     thiamine (B-1) tablet 100 mg         ALLERGIES:  Allergies   Allergen Reactions     Morphine Hcl Hives         PAST MEDICAL HISTORY:  Past Medical History:   Diagnosis Date     Current smoker      HTN (hypertension)      Hx of heart artery stent     2018 2 stents placed     Hyperlipidemia          PAST SURGICAL HISTORY:  Past Surgical History:   Procedure Laterality Date     IR LOWER EXTREMITY ANGIOGRAM BILATERAL  " 2019     OTHER SURGICAL HISTORY      hip surgery , hardware in place.         SOCIAL HISTORY:  Social History     Socioeconomic History     Marital status: Single     Spouse name: Not on file     Number of children: Not on file     Years of education: Not on file     Highest education level: Not on file   Occupational History     Not on file   Tobacco Use     Smoking status: Every Day     Packs/day: 1.00     Types: Cigarettes     Smokeless tobacco: Never   Vaping Use     Vaping status: Not on file   Substance and Sexual Activity     Alcohol use: Yes     Drug use: No     Sexual activity: Not on file   Other Topics Concern     Parent/sibling w/ CABG, MI or angioplasty before 65F 55M? Not Asked   Social History Narrative     Not on file     Social Determinants of Health     Financial Resource Strain: Not on file   Food Insecurity: Not on file   Transportation Needs: Not on file   Physical Activity: Not on file   Stress: Not on file   Social Connections: Not on file   Intimate Partner Violence: Not on file   Housing Stability: Not on file         FAMILY HISTORY:  No family history on file.      PHYSICAL EXAM:  Vital signs:  Temp: 98.9  F (37.2  C) Temp src: Oral BP: (!) 142/73 Pulse: 85   Resp: 16 SpO2: 98 % O2 Device: None (Room air)   Height: 177.8 cm (5' 10\") Weight: 77.6 kg (171 lb)  Estimated body mass index is 24.54 kg/m  as calculated from the following:    Height as of this encounter: 1.778 m (5' 10\").    Weight as of this encounter: 77.6 kg (171 lb).    ECO.5-2  GENERAL/CONSTITUTIONAL: No acute distress.  Pallor.  EYES: Pupils are equal, round, and react to light and accommodation. Extraocular movements intact.  No scleral icterus.  ENT/MOUTH: Neck supple. Oropharynx clear, no mucositis.  LYMPH: No anterior cervical, posterior cervical, supraclavicular, axillary or inguinal adenopathy.   RESPIRATORY: Clear to auscultation bilaterally. No crackles or wheezing.   CARDIOVASCULAR: Regular rate and rhythm " without murmurs, gallops, or rubs.  GASTROINTESTINAL: No hepatosplenomegaly, masses, or tenderness. The patient has normal bowel sounds. No guarding.  No distention.  NEUROLOGIC: Cranial nerves II-XII are intact. Alert, oriented, answers questions appropriately.      LABS:  CBC RESULTS:   Recent Labs   Lab Test 04/27/23  0803   WBC 5.1   RBC 2.19*   HGB 7.2*   HCT 22.0*   *   MCH 32.9   MCHC 32.7   RDW 13.3          Recent Labs   Lab Test 04/27/23  0803 04/26/23  0546    139   POTASSIUM 3.7 4.2   CHLORIDE 110* 108*   CO2 19* 22   ANIONGAP 9 9   GLC 93 100*   BUN 27.6* 36.2*   CR 3.03* 3.42*   MARY 8.9 9.0     Lab Results   Component Value Date    AST 26 04/27/2023    AST 22 11/10/2017     Lab Results   Component Value Date    ALT 17 04/27/2023    ALT 11 11/10/2017     No results found for: BILICONJ   Lab Results   Component Value Date    BILITOTAL 0.3 04/27/2023     Lab Results   Component Value Date    ALBUMIN 3.7 04/27/2023     Lab Results   Component Value Date    PROTTOTAL 6.8 04/27/2023      Lab Results   Component Value Date    ALKPHOS 69 04/27/2023        Latest Reference Range & Units 04/27/23 10:48   Occult Blood Negative  Negative      Latest Reference Range & Units 04/26/23 05:46 04/26/23 15:07   Iron 61 - 157 ug/dL  64   Iron Binding Capacity 240 - 430 ug/dL  203 (L)   Iron Sat Index 15 - 46 %  32   Vitamin B12 232 - 1,245 pg/mL 638       Latest Reference Range & Units 04/26/23 15:07   Folate 4.6 - 34.8 ng/mL 32.3         IMAGING:  EXAM: CT ABDOMEN PELVIS W/O CONTRAST  LOCATION: [facility]  DATE/TIME: 4/25/2023 8:58 PM CDT     INDICATION: Abdominal distention, increased lipase, anorexia, new renal insufficiency.     COMPARISON: 01/22/2021  TECHNIQUE: CT scan of the abdomen and pelvis was performed without IV contrast. Multiplanar reformats were obtained. Dose reduction techniques were used.  CONTRAST: None.     FINDINGS:   LOWER CHEST: No significant  infiltrate or effusion.     HEPATOBILIARY: Normal noncontrast liver parenchyma. No calcified gallstones.     PANCREAS: Normal noncontrast appearance of the pancreas. No ductal dilation or adjacent inflammatory change.     SPLEEN: Normal.     ADRENAL GLANDS: Normal.     KIDNEYS/BLADDER: Normal noncontrast appearance of the kidneys. No nephrolithiasis or hydronephrosis. Significant renal vascular calcification noted. Ureters unremarkable. Mildly distended urinary bladder.     BOWEL: No evidence of bowel obstruction or inflammation.     LYMPH NODES: No lymphadenopathy.     VASCULATURE: Diffuse atherosclerotic vascular calcification with bilateral common-external iliac stents and postoperative change in both groins.     PELVIC ORGANS: Mild prostatic enlargement. No pelvic mass.     MUSCULOSKELETAL: Degenerative and postoperative change lumbar spine. Mild superior endplate compression T12, new relative to 2021. No concerning lytic or sclerotic osseous lesions.                                                                      IMPRESSION:   1.  No acute abdominal/pelvic findings.     2.  Normal noncontrast appearance of the pancreas. No ductal dilation or adjacent inflammation.     3.  Aside from significant renal vascular calcification, kidneys are normal in unenhanced appearance. No hydronephrosis. Mildly distended urinary bladder and mild prostatic enlargement noted.     4.  Diffuse atherosclerotic vascular calcification with bilateral common-external iliac stents and postoperative changes in both groins.     5.  Mild superior endplate compression T12 new relative to 2021.          Thank you for the opportunity to participate in this patient's care.  Please call with any questions.    Joanna Kulkarni DO  Hematology/Oncology  [facility]

## 2023-04-27 NOTE — PROVIDER NOTIFICATION
MD Notification    Notified Person: MD    Notified Person Name: Tabatha Carnes     Notification Date/Time: 04/27/23 9:52 AM     Notification Interaction: vocera     Purpose of Notification: FYI Hgb 7.2. Cr 3.03    Orders Received: Transfuse 1U PRBCs      11:00 AM   Just wanted to clarify, did you want to transfuse now? One order has transfuse unscheduled and one says if Hgb less than 7     Orders Received: transfuse now, cardiology recommends keeping Hgb above 8.0    Comments:

## 2023-04-28 VITALS
WEIGHT: 171 LBS | BODY MASS INDEX: 24.48 KG/M2 | RESPIRATION RATE: 18 BRPM | TEMPERATURE: 98.4 F | OXYGEN SATURATION: 97 % | HEIGHT: 70 IN | DIASTOLIC BLOOD PRESSURE: 63 MMHG | HEART RATE: 82 BPM | SYSTOLIC BLOOD PRESSURE: 124 MMHG

## 2023-04-28 LAB
ALBUMIN SERPL ELPH-MCNC: 3.4 G/DL (ref 3.7–5.1)
ALPHA1 GLOB SERPL ELPH-MCNC: 0.3 G/DL (ref 0.2–0.4)
ALPHA2 GLOB SERPL ELPH-MCNC: 0.8 G/DL (ref 0.5–0.9)
ANION GAP SERPL CALCULATED.3IONS-SCNC: 13 MMOL/L (ref 7–15)
B-GLOBULIN SERPL ELPH-MCNC: 0.7 G/DL (ref 0.6–1)
BASOPHILS # BLD AUTO: 0 10E3/UL (ref 0–0.2)
BASOPHILS NFR BLD AUTO: 0 %
BUN SERPL-MCNC: 21.6 MG/DL (ref 8–23)
CALCIUM SERPL-MCNC: 9.2 MG/DL (ref 8.6–10)
CHLORIDE SERPL-SCNC: 112 MMOL/L (ref 98–107)
CREAT SERPL-MCNC: 2.81 MG/DL (ref 0.67–1.17)
DEPRECATED HCO3 PLAS-SCNC: 16 MMOL/L (ref 22–29)
EOSINOPHIL # BLD AUTO: 0.4 10E3/UL (ref 0–0.7)
EOSINOPHIL NFR BLD AUTO: 7 %
ERYTHROCYTE [DISTWIDTH] IN BLOOD BY AUTOMATED COUNT: 15.9 % (ref 10–15)
FERRITIN SERPL-MCNC: 290 NG/ML (ref 31–409)
GAMMA GLOB SERPL ELPH-MCNC: 1.1 G/DL (ref 0.7–1.6)
GFR SERPL CREATININE-BSD FRML MDRD: 25 ML/MIN/1.73M2
GLUCOSE SERPL-MCNC: 97 MG/DL (ref 70–99)
HAPTOGLOB SERPL-MCNC: 281 MG/DL (ref 32–197)
HBV SURFACE AB SERPL IA-ACNC: 54.52 M[IU]/ML
HBV SURFACE AB SERPL IA-ACNC: REACTIVE M[IU]/ML
HBV SURFACE AG SERPL QL IA: NONREACTIVE
HCT VFR BLD AUTO: 25.2 % (ref 40–53)
HCV AB SERPL QL IA: NONREACTIVE
HGB BLD-MCNC: 8.4 G/DL (ref 13.3–17.7)
HIV 1+2 AB+HIV1 P24 AG SERPL QL IA: NONREACTIVE
IGA SERPL-MCNC: 276 MG/DL (ref 84–499)
IGG SERPL-MCNC: 1158 MG/DL (ref 610–1616)
IGM SERPL-MCNC: 135 MG/DL (ref 35–242)
IMM GRANULOCYTES # BLD: 0 10E3/UL
IMM GRANULOCYTES NFR BLD: 0 %
KAPPA LC FREE SER-MCNC: 8.27 MG/DL (ref 0.33–1.94)
KAPPA LC FREE/LAMBDA FREE SER NEPH: 1.86 {RATIO} (ref 0.26–1.65)
LAMBDA LC FREE SERPL-MCNC: 4.45 MG/DL (ref 0.57–2.63)
LYMPHOCYTES # BLD AUTO: 2.1 10E3/UL (ref 0.8–5.3)
LYMPHOCYTES NFR BLD AUTO: 35 %
M PROTEIN SERPL ELPH-MCNC: 0 G/DL
MCH RBC QN AUTO: 31.8 PG (ref 26.5–33)
MCHC RBC AUTO-ENTMCNC: 33.3 G/DL (ref 31.5–36.5)
MCV RBC AUTO: 96 FL (ref 78–100)
MONOCYTES # BLD AUTO: 0.7 10E3/UL (ref 0–1.3)
MONOCYTES NFR BLD AUTO: 11 %
NEUTROPHILS # BLD AUTO: 2.8 10E3/UL (ref 1.6–8.3)
NEUTROPHILS NFR BLD AUTO: 47 %
NRBC # BLD AUTO: 0 10E3/UL
NRBC BLD AUTO-RTO: 0 /100
PLATELET # BLD AUTO: 149 10E3/UL (ref 150–450)
POTASSIUM SERPL-SCNC: 3.8 MMOL/L (ref 3.4–5.3)
PROT PATTERN SERPL ELPH-IMP: ABNORMAL
PROT PATTERN SERPL IFE-IMP: NORMAL
RBC # BLD AUTO: 2.64 10E6/UL (ref 4.4–5.9)
SODIUM SERPL-SCNC: 141 MMOL/L (ref 136–145)
TOTAL PROTEIN SERUM FOR ELP: 6.4 G/DL (ref 6.4–8.3)
WBC # BLD AUTO: 6 10E3/UL (ref 4–11)

## 2023-04-28 PROCEDURE — 99239 HOSP IP/OBS DSCHRG MGMT >30: CPT | Performed by: INTERNAL MEDICINE

## 2023-04-28 PROCEDURE — 85025 COMPLETE CBC W/AUTO DIFF WBC: CPT | Performed by: INTERNAL MEDICINE

## 2023-04-28 PROCEDURE — 250N000013 HC RX MED GY IP 250 OP 250 PS 637: Performed by: INTERNAL MEDICINE

## 2023-04-28 PROCEDURE — 258N000003 HC RX IP 258 OP 636: Performed by: INTERNAL MEDICINE

## 2023-04-28 PROCEDURE — 82310 ASSAY OF CALCIUM: CPT | Performed by: INTERNAL MEDICINE

## 2023-04-28 PROCEDURE — 250N000011 HC RX IP 250 OP 636: Performed by: INTERNAL MEDICINE

## 2023-04-28 PROCEDURE — 36415 COLL VENOUS BLD VENIPUNCTURE: CPT | Performed by: INTERNAL MEDICINE

## 2023-04-28 RX ORDER — NALOXONE HYDROCHLORIDE 0.4 MG/ML
0.2 INJECTION, SOLUTION INTRAMUSCULAR; INTRAVENOUS; SUBCUTANEOUS
Status: DISCONTINUED | OUTPATIENT
Start: 2023-04-28 | End: 2023-04-28 | Stop reason: HOSPADM

## 2023-04-28 RX ORDER — NALOXONE HYDROCHLORIDE 0.4 MG/ML
0.4 INJECTION, SOLUTION INTRAMUSCULAR; INTRAVENOUS; SUBCUTANEOUS
Status: DISCONTINUED | OUTPATIENT
Start: 2023-04-28 | End: 2023-04-28 | Stop reason: HOSPADM

## 2023-04-28 RX ORDER — LIDOCAINE 40 MG/G
CREAM TOPICAL
Status: DISCONTINUED | OUTPATIENT
Start: 2023-04-28 | End: 2023-04-28 | Stop reason: HOSPADM

## 2023-04-28 RX ORDER — HYDROMORPHONE HCL IN WATER/PF 6 MG/30 ML
0.2 PATIENT CONTROLLED ANALGESIA SYRINGE INTRAVENOUS ONCE
Status: DISCONTINUED | OUTPATIENT
Start: 2023-04-28 | End: 2023-04-28 | Stop reason: HOSPADM

## 2023-04-28 RX ORDER — HYDROXYZINE HYDROCHLORIDE 10 MG/1
10 TABLET, FILM COATED ORAL ONCE
Status: DISCONTINUED | OUTPATIENT
Start: 2023-04-28 | End: 2023-04-28 | Stop reason: HOSPADM

## 2023-04-28 RX ORDER — CLONIDINE HYDROCHLORIDE 0.1 MG/1
0.1 TABLET ORAL
Status: DISCONTINUED | OUTPATIENT
Start: 2023-04-28 | End: 2023-04-28 | Stop reason: HOSPADM

## 2023-04-28 RX ADMIN — ROSUVASTATIN CALCIUM 10 MG: 10 TABLET, FILM COATED ORAL at 08:45

## 2023-04-28 RX ADMIN — DARBEPOETIN ALFA 100 MCG: 100 INJECTION, SOLUTION INTRAVENOUS; SUBCUTANEOUS at 10:55

## 2023-04-28 RX ADMIN — Medication 50 MG: at 08:45

## 2023-04-28 RX ADMIN — SODIUM CHLORIDE: 9 INJECTION, SOLUTION INTRAVENOUS at 00:50

## 2023-04-28 RX ADMIN — MULTIPLE VITAMINS W/ MINERALS TAB 1 TABLET: TAB at 08:45

## 2023-04-28 RX ADMIN — FOLIC ACID 1 MG: 1 TABLET ORAL at 08:45

## 2023-04-28 RX ADMIN — THIAMINE HCL TAB 100 MG 100 MG: 100 TAB at 08:45

## 2023-04-28 ASSESSMENT — ACTIVITIES OF DAILY LIVING (ADL)
ADLS_ACUITY_SCORE: 20

## 2023-04-28 NOTE — DISCHARGE SUMMARY
[facility]  Hospitalist Discharge Summary      Date of Admission:  4/25/2023  Date of Discharge:  4/28/2023  Discharging Provider: Tabatha Carnes MD  Discharge Service: Hospitalist Service    Discharge Diagnoses   BERNARD on CKD stage II-III versus progressive CKD  Low-grade proteinuria  Progressive anemia  Dyspnea on exertion likely secondary to above  Aortic stenosis  Hypertension  Hyperlipidemia  Alcohol use disorder  Peripheral artery disease  Chronic low back pain  History of lumbar decompression and spinal fusion    Follow-ups Needed After Discharge   Follow-up Appointments     Follow-up and recommended labs and tests       Follow up with primary care provider, Santosh Ortega, within 7 days   for hospital follow- up.  The following labs/tests are recommended:   CBC/BMP.    Follow-up with your primary nephrologist earliest next available, renal   biopsy timing for as per nephrology  Follow-up with hematology/oncology earliest next available  Follow-up with GI as previously scheduled           Unresulted Labs Ordered in the Past 30 Days of this Admission     Date and Time Order Name Status Description    4/27/2023  4:16 PM Protein Electrophoresis, Serum In process     4/27/2023  1:23 PM HIV Antigen Antibody Combo In process     4/27/2023  1:23 PM Hepatitis C antibody In process     4/27/2023  1:23 PM Hepatitis B Surface Antibody In process     4/27/2023  1:23 PM Hepatitis B surface antigen In process     4/27/2023  1:23 PM Protein Immunofixation Serum In process       These results will be followed up by [facility] oncology team    Discharge Disposition   Discharged to home  Condition at discharge: Stable    Hospital Course   Grey Candelario is a 57 year old male admitted on 4/25/2023. 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 BERNARD to 2.6-2.8 in 8-10/2022 reportedly around time of  contrast administration. 10/2022 creatine was 1.4-1.8.  Saw nephrology 11/2022, was to see in 3 mo but lost to follow up  *was using mild-mod amounts NSAIDS until a month ago. Having sx of nausea, excessive sleeping, fatigue and shaking. Labs were checked and pt has creatinine of 3.6. In ED afebrile, mildly tachy.   -CT a/p without contrast w/o acute findings, kidneys with significant renal calcification, normal in appearance, no hydro.   -Nephrology followed while in-house, with hydration creatinine improved up to 2.81 on the day of discharge  -Patient was to have kidney biopsy today, however he refused to get it done inpatient and wants to follow-up with his primary nephrologist to get it done.  Hematology recommending Congo red stain on the biopsy sample  - PTA lisinopril/HCTZ was held during hospital stay and also held at discharge    Progressive anemia  Hgb 8.4 on presentation. Hgb on October and November through [facility] nephrology was in the 11 range however looking back at his records back in October 2022 through [facility] his hemoglobin was in the 8s multiple times  -Patient denies any active bleeding or black tarry stool  -Iron studies, B12 and folate normal  -Hemoglobin dropped to Tad of 7.2 with hydration, Hemoccult stool negative, CT abdomen pelvis with no obvious intra-abdominal bleed.  Given ongoing dyspnea for symptomatic management and as per recommendation by cardiology patient was given 1 unit of PRBC with improvement in his symptoms and improvement in his hemoglobin to 8.4 on the day of discharge.  No active bleeding  -[facility] consulted, patient to follow-up with GI outpatient  -Hematology was consulted, serum electrophoresis and immunofixation sent as per hematology, to be followed by hematology outpatient.  Patient will possibly need bone marrow biopsy outpatient     Aortic stenosis 2/2022   HTN  HLD  Progressive dyspnea on exertion  *Stress echo in 2/2022 with EF 65-75%, severe  aortic stenosis, valve area 1.0, gradient 36 mm Hg, nl LV size  *c/o significant progressive SOB with exertion. Doesn't appear volume overloaded on admission  -However echocardiogram done here with EF of 60-65%, mild to moderate valvular aortic stenosis, no regional wall motion abnormality  -Cardiology evaluated the patient, did not think his dyspnea was related to his aortic stenosis or any cardiac etiology, recommended keeping hemoglobin more than 8  -Outpatient follow-up with Dr. Luna as previously scheduled on 5/5/2023  -Hold PTA Crestor given muscle cramps     Lower extremity cramps  -Likely multifactorial in the setting of PAD, BERNARD, history of lumbar decompression/spinal fusion  -Iron studies, magnesium and CK level unremarkable  -Outpatient follow-up    Alcohol use disorder  Forthcoming that he is alcoholic, drinks 4-5 vodka drinks a day. No hx withdrawal in the past.   Patient did not have any withdrawal while in the hospital     PAD  Atherosclerosis arteriosclerosis obliterans  S/p bilateral common external iliac stenting in 2018 ultimately needing R iliofemoral bypass and L femoral endarterectomy/ patch angioplasty 2/2022.   Not currently on antiplatelet or anticoagulant for this  -Follow-up as previously scheduled with his vascular surgery     Chronic low back pain  Hx lumbar decompression  Hx spinal fusion ~20 yrs ago  Not currently taking pain meds for this.       Consultations This Hospital Stay   NEPHROLOGY IP CONSULT  CARDIOLOGY IP CONSULT  HEMATOLOGY & ONCOLOGY IP CONSULT  GASTROENTEROLOGY IP CONSULT    Code Status   Full Code    Time Spent on this Encounter   I, Tabatha Carnes MD, personally saw the patient today and spent greater than 30 minutes discharging this patient.       Tabatha Carnes MD  [place] [address] [address]  [facility] [place] [address]  Phone: [phone]  ______________________________________________________________________    Physical Exam    Vital Signs: Temp: 98.4  F (36.9  C) Temp src: Oral BP: 124/63 Pulse: 82   Resp: 18 SpO2: 97 % O2 Device: None (Room air)    Weight: 171 lbs 0 oz  Exam:  Constitutional: Awake, alert and no distress. Appears comfortable  Head: Normocephalic. No masses, lesions, tenderness or abnormalities  ENT: ENT exam normal, no neck nodes or sinus tenderness  Cardiovascular: RRR.  No murmurs, no rubs or JVD  Respiratory: Normal WOB,b/l equal air entry, no wheezes or crackles   Gastrointestinal: Abdomen soft, non-tender. BS normal. No masses, organomegaly  : Deferred  Extremities : No edema , no clubbing or cyanosis    Neurologic: Cranial nerves II-XII grossly intact , power symmetrical, Reflexes normal and symmetric. Sensation grossly WNL.         Primary Care Physician   Santosh Ortega    Discharge Orders      Reason for your hospital stay    Acute kidney injury versus progressive renal failure, severe anemia causing dyspnea.     Follow-up and recommended labs and tests     Follow up with primary care provider, Santosh Ortega, within 7 days for hospital follow- up.  The following labs/tests are recommended: CBC/BMP.    Follow-up with your primary nephrologist earliest next available, renal biopsy timing for as per nephrology  Follow-up with hematology/oncology earliest next available  Follow-up with GI as previously scheduled     Activity    Your activity upon discharge: activity as tolerated     Monitor and record    blood pressure daily and readings to PCP for any medication adjustment.  Your losartan/hydrochlorothiazide was held at discharge due to renal dysfunction and blood pressure being stable without any medication.  PCP to adjust medication as needed     Diet    Follow this diet upon discharge: Renal (non-dialysis)       Significant Results and Procedures   Results for orders placed or performed during the hospital encounter of 04/25/23   CT Abdomen Pelvis w/o Contrast    Narrative    EXAM: CT ABDOMEN  PELVIS W/O CONTRAST  LOCATION: [facility]  DATE/TIME: 4/25/2023 8:58 PM CDT    INDICATION: Abdominal distention, increased lipase, anorexia, new renal insufficiency.    COMPARISON: 01/22/2021  TECHNIQUE: CT scan of the abdomen and pelvis was performed without IV contrast. Multiplanar reformats were obtained. Dose reduction techniques were used.  CONTRAST: None.    FINDINGS:   LOWER CHEST: No significant infiltrate or effusion.    HEPATOBILIARY: Normal noncontrast liver parenchyma. No calcified gallstones.    PANCREAS: Normal noncontrast appearance of the pancreas. No ductal dilation or adjacent inflammatory change.    SPLEEN: Normal.    ADRENAL GLANDS: Normal.    KIDNEYS/BLADDER: Normal noncontrast appearance of the kidneys. No nephrolithiasis or hydronephrosis. Significant renal vascular calcification noted. Ureters unremarkable. Mildly distended urinary bladder.    BOWEL: No evidence of bowel obstruction or inflammation.    LYMPH NODES: No lymphadenopathy.    VASCULATURE: Diffuse atherosclerotic vascular calcification with bilateral common-external iliac stents and postoperative change in both groins.    PELVIC ORGANS: Mild prostatic enlargement. No pelvic mass.    MUSCULOSKELETAL: Degenerative and postoperative change lumbar spine. Mild superior endplate compression T12, new relative to 2021. No concerning lytic or sclerotic osseous lesions.      Impression    IMPRESSION:   1.  No acute abdominal/pelvic findings.    2.  Normal noncontrast appearance of the pancreas. No ductal dilation or adjacent inflammation.    3.  Aside from significant renal vascular calcification, kidneys are normal in unenhanced appearance. No hydronephrosis. Mildly distended urinary bladder and mild prostatic enlargement noted.    4.  Diffuse atherosclerotic vascular calcification with bilateral common-external iliac stents and postoperative changes in both groins.    5.  Mild superior endplate compression T12 new  relative to .     Echocardiogram Complete     Value    LVEF  60-65%    Narrative    [id number]  [id number]  [id number]  [id number]^TALITA^TIBURCIO^SHANTHI     [facility]  Echocardiography Laboratory  [facility] [place] [address]     Name: MARY LOU OWENS  MRN: [id number]  : 1965  Study Date: 2023 09:49 AM  Age: 57 yrs  Gender: Male  Patient Location: [facility]  Reason For Study: Aortic Valve Disorder  Ordering Physician: TIBURCIO SANON  Referring Physician: TIBURCIO SANON  Performed By: Masood Ortiz     BSA: 2.0 m2  Height: 70 in  Weight: 171 lb  HR: 84  BP: 129/58 mmHg  ______________________________________________________________________________  Procedure  Complete Echo Adult. Optison (NDC #5562-1005) given intravenously.  ______________________________________________________________________________  Interpretation Summary     Left ventricular systolic function is normal.  The visual ejection fraction is 60-65%.  No regional wall motion abnormalities noted.  AV not werll visualzied, bicuspir aortic valve not excluded.  Mild to moderate valvular aortic stenosis.  The mean AoV pressure gradient is 19mmHg.  The study was technically adequate. There is no comparison study available.  ______________________________________________________________________________  Left Ventricle  The left ventricle is normal in size. There is normal left ventricular wall  thickness. Left ventricular systolic function is normal. The visual ejection  fraction is 60-65%. Left ventricular diastolic function is indeterminate. No  regional wall motion abnormalities noted.     Right Ventricle  The right ventricle is normal size. The right ventricular systolic function is  normal.     Atria  Normal left atrial size. Right atrial size is normal.     Mitral Valve  There is mild mitral annular calcification. There is trace mitral  regurgitation.     Tricuspid Valve  No tricuspid  regurgitation. Right ventricular systolic pressure could not be  approximated due to inadequate tricuspid regurgitation. IVC diameter <2.1 cm  collapsing >50% with sniff suggests a normal RA pressure of 3 mmHg.     Aortic Valve  The aortic valve is not well visualized. It is moderately thickened and  calcified. Mild to moderate valvular aortic stenosis. The mean AoV pressure  gradient is 19mmHg.     Pulmonic Valve  The pulmonic valve is not well seen, but is grossly normal.     Vessels  The aortic root is normal size.     Pericardium  There is no pericardial effusion.     Rhythm  Sinus rhythm was noted.  ______________________________________________________________________________  MMode/2D Measurements & Calculations  IVSd: 1.0 cm     LVIDd: 4.2 cm  LVIDs: 2.7 cm  LVPWd: 1.0 cm  FS: 35.7 %  LV mass(C)d: 137.2 grams  LV mass(C)dI: 70.3 grams/m2  Ao root diam: 3.3 cm  LA dimension: 3.7 cm  asc Aorta Diam: 3.2 cm  LA/Ao: 1.1  LVOT diam: 2.3 cm  LVOT area: 4.0 cm2  LA Volume (BP): 41.6 ml  LA Volume Index (BP): 21.3 ml/m2  RV Base: 3.1 cm  RWT: 0.48     TAPSE: 1.8 cm     Doppler Measurements & Calculations  MV E max smooth: 121.0 cm/sec  MV A max smooth: 135.0 cm/sec  MV E/A: 0.90  MV dec time: 0.22 sec  Ao V2 max: 288.7 cm/sec  Ao max P.0 mmHg  Ao V2 mean: 205.3 cm/sec  Ao mean P.0 mmHg  Ao V2 VTI: 62.6 cm  ARACELIS(I,D): 1.4 cm2  ARACELIS(V,D): 1.4 cm2  LV V1 max PG: 3.8 mmHg  LV V1 max: 98.0 cm/sec  LV V1 VTI: 22.0 cm  SV(LVOT): 88.8 ml  SI(LVOT): 45.5 ml/m2  PA acc time: 0.12 sec  AV Smooth Ratio (DI): 0.34  ARACELIS Index (cm2/m2): 0.73  E/E' avg: 15.4  Lateral E/e': 13.7  Medial E/e': 17.1  RV S Smooth: 16.5 cm/sec     ______________________________________________________________________________  Report approved by: Carmina Downey 2023 11:12 AM               Discharge Medications   Current Discharge Medication List      CONTINUE these medications which have NOT CHANGED    Details   rosuvastatin (CRESTOR) 40 MG tablet  TAKE 1 TABLET BY MOUTH ONE TIME DAILY  Qty: 90 tablet, Refills: 3    Associated Diagnoses: Hyperlipidemia LDL goal <70         STOP taking these medications       losartan-hydrochlorothiazide (HYZAAR) 100-25 MG tablet Comments:   Reason for Stopping:             Allergies   Allergies   Allergen Reactions     Morphine Hcl Hives

## 2023-04-28 NOTE — PLAN OF CARE
DATE & TIME: 04/27/23 NOC    Cognitive Concerns/ Orientation: A&Ox4   BEHAVIOR & AGGRESSION TOOL COLOR: Green  CIWA SCORE: 5, 6, 5- headache, tremors, anxious  ABNL VS/O2: VSS on RA  MOBILITY: up ad kenna  PAIN MANAGMENT: Denies  DIET: Regular, appetite improving. NPO this shift for renal biopsy this morning.  BOWEL/BLADDER: Continent  ABNL LAB/BG: Stool occult- negative. Proteinuria. Cr 3.03, improving. BUN 27.6. CO2 19. Hgb 7.2  DRAIN/DEVICES: R PIV infusing NS at 100mL/hr  TELEMETRY RHYTHM: NA  SKIN: Intact   TESTS/PROCEDURES: Blood transfusion today. 24 protein electrophoresis urine started at 1400 in progress. Renal biopsy 4/28 at noon per nephrology's note  D/C DAY/GOALS/PLACE: Possibly home tomorrow 4/28  OTHER IMPORTANT INFO: Nephrology following. Hem/onc consulted. GI consulted and signed off.

## 2023-04-28 NOTE — PLAN OF CARE
Goal Outcome Evaluation:      Plan of Care Reviewed With: patient, spouse    DATE & TIME: 04/28/23 [id number]    Cognitive Concerns/ Orientation: A&Ox4   BEHAVIOR & AGGRESSION TOOL COLOR: Green  CIWA SCORE: 5 - headache, tremors, anxious  ABNL VS/O2: VSS on RA  MOBILITY: IND  PAIN MANAGMENT: Denies  DIET: NPO for renal biopsy- will now be doing this as outpt  BOWEL/BLADDER: Continent  ABNL LAB/BG: Stool occult- negative. Hgb 8.4  DRAIN/DEVICES: discontinued for discharge   TELEMETRY RHYTHM: NA  SKIN: Intact   TESTS/PROCEDURES: Renal biopsy 4/28 at noon per nephrology's note. Pt decided to do out pt instead  D/C DAY/GOALS/PLACE: Home today  OTHER IMPORTANT INFO: Nephrology following. Hem/onc consulted. GI consulted and signed off. Pt had orders for 24 hour urine collection, collection wasn't done correctly and pt ended up discharging prior to the 24 hour johnny.    Discharge    Patient discharged to home via private transport with belongings  Care plan note see above    Listed belongings gathered and given to patient (including from security/pharmacy). Yes  Care Plan and Patient education resolved: Yes  Prescriptions if needed, hard copies sent with patient  NA  Medication Bin checked and emptied on discharge Yes  SW/care coordinator/charge RN aware of discharge: Yes

## 2023-05-02 ENCOUNTER — MEDICAL CORRESPONDENCE (OUTPATIENT)
Dept: HEALTH INFORMATION MANAGEMENT | Facility: CLINIC | Age: 58
End: 2023-05-02
Payer: COMMERCIAL

## 2023-05-05 ENCOUNTER — OFFICE VISIT (OUTPATIENT)
Dept: CARDIOLOGY | Facility: CLINIC | Age: 58
End: 2023-05-05
Attending: FAMILY MEDICINE
Payer: COMMERCIAL

## 2023-05-05 VITALS
OXYGEN SATURATION: 99 % | HEIGHT: 70 IN | HEART RATE: 88 BPM | WEIGHT: 189 LBS | BODY MASS INDEX: 27.06 KG/M2 | DIASTOLIC BLOOD PRESSURE: 69 MMHG | SYSTOLIC BLOOD PRESSURE: 139 MMHG

## 2023-05-05 DIAGNOSIS — I35.0 SEVERE AORTIC VALVE STENOSIS: Primary | ICD-10-CM

## 2023-05-05 PROCEDURE — 99207 PR NO DOCUMENTATION ON VISIT: CPT | Performed by: INTERNAL MEDICINE

## 2023-05-05 NOTE — Clinical Note
"5/5/2023    Santosh Ortega MD  [address]  [place] [address]    RE: [employer]       Dear Colleague,     I had the pleasure of seeing Grye Candelario in the [facility].    SERVICE DATE: 5/5/2023    PRIMARY CARE AND REFERRING PROVIDER:  Santosh Ortega  [address]  [place] [address]    REASON FOR VISIT:      HISTORY OF PRESENT ILLNESS:      DIAGNOSES/ASSESSMENT:      PLAN:  1.      2.        3.  Patient education and counseling:       Jameson Luna MD    New patient.  Established patient.   Today's clinic visit entailed:  {[facility] 2021 Documentation (Optional):313390}  {2021 E&M time:867935}  Provider  Link to [facility] Help Grid     {[facility] Level:703313}          PHYSICAL EXAMINATION:  Vitals: /69   Pulse 88   Ht 1.778 m (5' 10\")   Wt 85.7 kg (189 lb)   SpO2 99%   BMI 27.12 kg/m    Wt Readings from Last 5 Encounters:   05/05/23 85.7 kg (189 lb)   04/25/23 77.6 kg (171 lb)   11/14/19 77.8 kg (171 lb 9.6 oz)   06/06/19 72.1 kg (159 lb)   11/16/18 72.3 kg (159 lb 8 oz)     CARDIOVASCULAR:  Regular heart sounds.  No murmur. No carotid bruit.  RESPIRATORY:  Normal breath sounds. No rales or wheeze.  EXTREMITIES:  No edema.    Encounter Diagnoses   Name Primary?     Severe aortic valve stenosis      Systolic murmur          No orders of the defined types were placed in this encounter.          CURRENT MEDICATIONS:  Current Outpatient Medications   Medication Sig Dispense Refill     rosuvastatin (CRESTOR) 40 MG tablet TAKE 1 TABLET BY MOUTH ONE TIME DAILY 90 tablet 3         ALLERGIES:  Allergies   Allergen Reactions     Morphine Hcl Hives       PAST MEDICAL HISTORY:    Past Medical History:   Diagnosis Date     Current smoker      HTN (hypertension)      Hx of heart artery stent     2018 2 stents placed     Hyperlipidemia        PAST SURGICAL HISTORY:    Past Surgical History:   Procedure Laterality Date     IR LOWER EXTREMITY ANGIOGRAM BILATERAL  " 11/14/2019     OTHER SURGICAL HISTORY      hip surgery , hardware in place.       FAMILY HISTORY:    History reviewed. No pertinent family history.    SOCIAL HISTORY:    Social History     Socioeconomic History     Marital status: Single     Spouse name: None     Number of children: None     Years of education: None     Highest education level: None   Tobacco Use     Smoking status: Every Day     Packs/day: 1.00     Types: Cigarettes     Smokeless tobacco: Never   Substance and Sexual Activity     Alcohol use: Yes     Drug use: No                           Thank you for allowing me to participate in the care of your patient.      Sincerely,     Jameson Luna MD     [facility]  cc:   Santosh Ortega MD  [address] [address]

## 2023-05-05 NOTE — PROGRESS NOTES
"  SERVICE DATE: 5/5/2023    PRIMARY CARE AND REFERRING PROVIDER:  Santosh Ortega  [address] [facility] [address]  [facility] [address]    REASON FOR VISIT:      HISTORY OF PRESENT ILLNESS:      DIAGNOSES/ASSESSMENT:      PLAN:  1.      2.        3.  Patient education and counseling:       Jameson Luna MD    New patient.  The level of medical decision making during this visit was of moderate complexity.          PHYSICAL EXAMINATION:  Vitals: /69   Pulse 88   Ht 1.778 m (5' 10\")   Wt 85.7 kg (189 lb)   SpO2 99%   BMI 27.12 kg/m    Wt Readings from Last 5 Encounters:   05/11/23 87 kg (191 lb 12.8 oz)   05/10/23 85.2 kg (187 lb 14.4 oz)   05/05/23 85.7 kg (189 lb)   04/25/23 77.6 kg (171 lb)   11/14/19 77.8 kg (171 lb 9.6 oz)     CARDIOVASCULAR:  Regular heart sounds.  No murmur. No carotid bruit.  RESPIRATORY:  Normal breath sounds. No rales or wheeze.  EXTREMITIES:  No edema.    Encounter Diagnosis   Name Primary?    Severe aortic valve stenosis Yes         No orders of the defined types were placed in this encounter.          CURRENT MEDICATIONS:  Current Outpatient Medications   Medication Sig Dispense Refill    rosuvastatin (CRESTOR) 40 MG tablet TAKE 1 TABLET BY MOUTH ONE TIME DAILY 90 tablet 3    torsemide (DEMADEX) 20 MG tablet Take 20 mg by mouth           ALLERGIES:  Allergies   Allergen Reactions    Morphine Hcl Hives       PAST MEDICAL HISTORY:    Past Medical History:   Diagnosis Date    Current smoker     HTN (hypertension)     Hx of heart artery stent     2018 2 stents placed    Hyperlipidemia        PAST SURGICAL HISTORY:    Past Surgical History:   Procedure Laterality Date    IR LOWER EXTREMITY ANGIOGRAM BILATERAL  11/14/2019    IR RENAL BIOPSY RIGHT  5/10/2023    OTHER SURGICAL HISTORY      hip surgery , hardware in place.       FAMILY HISTORY:    History reviewed. No pertinent family history.    SOCIAL HISTORY:    Social History     Socioeconomic History    Marital status: Single "     Spouse name: None    Number of children: None    Years of education: None    Highest education level: None   Tobacco Use    Smoking status: Every Day     Packs/day: 1.00     Types: Cigarettes    Smokeless tobacco: Never   Substance and Sexual Activity    Alcohol use: Yes    Drug use: No

## 2023-05-09 ENCOUNTER — MYC MEDICAL ADVICE (OUTPATIENT)
Dept: INTERVENTIONAL RADIOLOGY/VASCULAR | Facility: CLINIC | Age: 58
End: 2023-05-09
Payer: COMMERCIAL

## 2023-05-10 ENCOUNTER — APPOINTMENT (OUTPATIENT)
Dept: INTERVENTIONAL RADIOLOGY/VASCULAR | Facility: CLINIC | Age: 58
End: 2023-05-10
Payer: COMMERCIAL

## 2023-05-10 ENCOUNTER — HOSPITAL ENCOUNTER (OUTPATIENT)
Facility: CLINIC | Age: 58
Discharge: HOME | End: 2023-05-10
Attending: RADIOLOGY | Admitting: RADIOLOGY
Payer: COMMERCIAL

## 2023-05-10 ENCOUNTER — APPOINTMENT (OUTPATIENT)
Dept: MEDSURG UNIT | Facility: CLINIC | Age: 58
End: 2023-05-10
Attending: RADIOLOGY
Payer: COMMERCIAL

## 2023-05-10 VITALS
SYSTOLIC BLOOD PRESSURE: 136 MMHG | BODY MASS INDEX: 26.9 KG/M2 | WEIGHT: 187.9 LBS | TEMPERATURE: 98.2 F | HEART RATE: 74 BPM | DIASTOLIC BLOOD PRESSURE: 60 MMHG | RESPIRATION RATE: 16 BRPM | HEIGHT: 70 IN | OXYGEN SATURATION: 98 %

## 2023-05-10 DIAGNOSIS — N18.4 CHRONIC KIDNEY DISEASE, STAGE 4 (SEVERE) (H): ICD-10-CM

## 2023-05-10 DIAGNOSIS — N17.9 ACUTE RENAL FAILURE, UNSPECIFIED ACUTE RENAL FAILURE TYPE (H): ICD-10-CM

## 2023-05-10 PROCEDURE — 250N000009 HC RX 250: Performed by: PHYSICIAN ASSISTANT

## 2023-05-10 PROCEDURE — 250N000011 HC RX IP 250 OP 636: Performed by: PHYSICIAN ASSISTANT

## 2023-05-10 PROCEDURE — 258N000003 HC RX IP 258 OP 636: Performed by: PHYSICIAN ASSISTANT

## 2023-05-10 PROCEDURE — 50200 RENAL BIOPSY PERQ: CPT | Mod: RT | Performed by: RADIOLOGY

## 2023-05-10 PROCEDURE — 88346 IMFLUOR 1ST 1ANTB STAIN PX: CPT | Mod: 26 | Performed by: PATHOLOGY

## 2023-05-10 PROCEDURE — 76942 ECHO GUIDE FOR BIOPSY: CPT | Mod: 26 | Performed by: RADIOLOGY

## 2023-05-10 PROCEDURE — 88305 TISSUE EXAM BY PATHOLOGIST: CPT | Mod: 26 | Performed by: PATHOLOGY

## 2023-05-10 PROCEDURE — 88313 SPECIAL STAINS GROUP 2: CPT | Mod: TC

## 2023-05-10 PROCEDURE — 99152 MOD SED SAME PHYS/QHP 5/>YRS: CPT

## 2023-05-10 PROCEDURE — 999N000142 HC STATISTIC PROCEDURE PREP ONLY

## 2023-05-10 PROCEDURE — 88350 IMFLUOR EA ADDL 1ANTB STN PX: CPT | Mod: 26 | Performed by: PATHOLOGY

## 2023-05-10 PROCEDURE — 88305 TISSUE EXAM BY PATHOLOGIST: CPT | Mod: TC

## 2023-05-10 PROCEDURE — 88348 ELECTRON MICROSCOPY DX: CPT | Mod: 26 | Performed by: PATHOLOGY

## 2023-05-10 PROCEDURE — 88313 SPECIAL STAINS GROUP 2: CPT | Mod: 26 | Performed by: PATHOLOGY

## 2023-05-10 PROCEDURE — 99152 MOD SED SAME PHYS/QHP 5/>YRS: CPT | Mod: GC | Performed by: RADIOLOGY

## 2023-05-10 PROCEDURE — 999N000133 HC STATISTIC PP CARE STAGE 2

## 2023-05-10 PROCEDURE — 272N000505 HC NEEDLE CR5

## 2023-05-10 RX ORDER — LIDOCAINE 40 MG/G
CREAM TOPICAL
Status: DISCONTINUED | OUTPATIENT
Start: 2023-05-10 | End: 2023-05-10

## 2023-05-10 RX ORDER — NALOXONE HYDROCHLORIDE 0.4 MG/ML
0.2 INJECTION, SOLUTION INTRAMUSCULAR; INTRAVENOUS; SUBCUTANEOUS
Status: DISCONTINUED | OUTPATIENT
Start: 2023-05-10 | End: 2023-05-10

## 2023-05-10 RX ORDER — NALOXONE HYDROCHLORIDE 0.4 MG/ML
0.4 INJECTION, SOLUTION INTRAMUSCULAR; INTRAVENOUS; SUBCUTANEOUS
Status: DISCONTINUED | OUTPATIENT
Start: 2023-05-10 | End: 2023-05-10

## 2023-05-10 RX ORDER — FLUMAZENIL 0.1 MG/ML
0.2 INJECTION, SOLUTION INTRAVENOUS
Status: DISCONTINUED | OUTPATIENT
Start: 2023-05-10 | End: 2023-05-10

## 2023-05-10 RX ORDER — SODIUM CHLORIDE 9 MG/ML
INJECTION, SOLUTION INTRAVENOUS CONTINUOUS
Status: DISCONTINUED | OUTPATIENT
Start: 2023-05-10 | End: 2023-05-10

## 2023-05-10 RX ORDER — FENTANYL CITRATE 50 UG/ML
25-50 INJECTION, SOLUTION INTRAMUSCULAR; INTRAVENOUS EVERY 5 MIN PRN
Status: DISCONTINUED | OUTPATIENT
Start: 2023-05-10 | End: 2023-05-10

## 2023-05-10 RX ADMIN — FENTANYL CITRATE 50 MCG: 50 INJECTION INTRAMUSCULAR; INTRAVENOUS at 14:18

## 2023-05-10 RX ADMIN — LIDOCAINE HYDROCHLORIDE 7 ML: 10 INJECTION, SOLUTION EPIDURAL; INFILTRATION; INTRACAUDAL; PERINEURAL at 14:38

## 2023-05-10 RX ADMIN — FENTANYL CITRATE 50 MCG: 50 INJECTION INTRAMUSCULAR; INTRAVENOUS at 14:28

## 2023-05-10 RX ADMIN — MIDAZOLAM 2 MG: 1 INJECTION INTRAMUSCULAR; INTRAVENOUS at 14:18

## 2023-05-10 RX ADMIN — SODIUM CHLORIDE 500 ML: 9 INJECTION, SOLUTION INTRAVENOUS at 13:15

## 2023-05-10 RX ADMIN — MIDAZOLAM 1 MG: 1 INJECTION INTRAMUSCULAR; INTRAVENOUS at 14:28

## 2023-05-10 ASSESSMENT — ACTIVITIES OF DAILY LIVING (ADL)
ADLS_ACUITY_SCORE: 35
ADLS_ACUITY_SCORE: 35

## 2023-05-10 NOTE — DISCHARGE INSTRUCTIONS
[facility]    Interventional Radiology  Patient Instructions Following Biopsy    AFTER YOU GO HOME  If you were given sedation DO NOT drive or operate machinery at home or at work for at least 24 hours  DO relax and take it easy for 48 hours, no strenuous activity for 24 hours  DO drink plenty of fluids  DO resume your regular diet, unless otherwise instructed by your Primary Physician  Keep the dressing dry and in place for 24 hours.  DO NOT SMOKE FOR AT LEAST 24 HOURS, if you have been given any medications that were to help you relax or sedate you during your procedure  DO NOT drink alcoholic beverages the day of your procedure  DO NOT do any strenuous exercise or lifting (> 10 lbs) for at least 7 days following your procedure  DO NOT take a bath or shower for at least 12 hours following your procedure  Remove dressing after shower the next day. Replace with Band aid for 2 days.  Never leave a wet dressing in place.  DO NOT make any important or legal decisions for 24 hours following your procedure  There should be minimum drainage from the biopsy site    CALL THE PHYSICIAN IF:  You start bleeding from the procedure site.  If you do start to bleed from that site, lie down flat and hold pressure on the site for a minimum of 10 minutes.  Your physician will tell you if you need to return to the hospital  You develop nausea or vomiting  You have excessive swelling, redness, or tenderness at the site  You have drainage that looks like it is infected.  You experience severe pain  You develop hives or a rash or unexplained itching  You develop shortness of breath  You develop a temperature of 101 degrees F or greater  You develop bloody clots or red urine after you are discharged    [facility] INTERVENTIONAL RADIOLOGY DEPARTMENT  Procedure Physician: Dr English & Dr Jeffrey                                     Date of procedure: May 10, 2023  Telephone Numbers: [phone]      Monday-Friday 7:30 am to 4:00  pm  [phone] After 4:00 pm Monday-Friday, Weekends & Holidays.   Ask for the Interventional Radiologist on call.  Someone is on call 24 hrs/day  [facility] toll free number: [phone] Monday-Friday 8:00 am to 4:30 pm  [facility] Emergency Dept: [phone]

## 2023-05-10 NOTE — IR NOTE
Patient Name: Grey Candelario  Medical Record Number: [id number]  Today's Date: 5/10/2023    Procedure: right native kidney biopsy  Proceduralist: Donnie Jeffrey and Amador  Pathology present: yes    Procedure Start: 1425  Procedure end: 1445  Sedation medications administered: versed 3 Mg., fentanyl  100 Mcg.    Report given to: TERRENCE RN    Other Notes: Pt arrived to IR room CT rm 2  from . Consent reviewed. Pt denies any questions or concerns regarding procedure. Pt positioned prone and monitored per protocol. Pt tolerated procedure without any noted complications. Pt transferred back to . 2 hours bedrest post procedure.

## 2023-05-10 NOTE — PROCEDURES
[facility]    Procedure: Random kidney biopsy    Date/Time: 5/10/2023 2:49 PM    Performed by: Surjit English DO  Authorized by: Surjit English DO      UNIVERSAL PROTOCOL   Site Marked: NA  Prior Images Obtained and Reviewed:  Yes  Required items: Required blood products, implants, devices and special equipment available    Patient identity confirmed:  Verbally with patient, arm band, provided demographic data and hospital-assigned identification number  Patient was reevaluated immediately before administering moderate or deep sedation or anesthesia  Confirmation Checklist:  Patient's identity using two indicators, relevant allergies, procedure was appropriate and matched the consent or emergent situation and correct equipment/implants were available  Time out: Immediately prior to the procedure a time out was called    Universal Protocol: the Joint Commission Universal Protocol was followed    Preparation: Patient was prepped and draped in usual sterile fashion       ANESTHESIA    Anesthesia: Local infiltration  Local Anesthetic:  Lidocaine 1% without epinephrine      SEDATION  Patient Sedated: Yes    Vital signs: Vital signs monitored during sedation    See dictated procedure note for full details.  Findings: Right lower pole renal biopsy with three core samples obtained and determined to be adequate by onsite pathology.    Specimens: core needle biopsy specimens sent for pathological analysis    Complications: None    Condition: Stable    Plan: Routine aftercare, 2 hours bedrest.      PROCEDURE    Patient Tolerance:  Patient tolerated the procedure well with no immediate complications  Length of time physician/provider present for 1:1 monitoring during sedation: 20

## 2023-05-10 NOTE — PROGRESS NOTES
Tolerated bedrest without issues.  Tolerated food, fluids, ambulation and urination of 300cc of clear yellow urine.  Reviewed discharge instructions with Grey.  Denies pain.  Right flank site CDI.  Discharged to home with wife.

## 2023-05-10 NOTE — PROCEDURES
[facility]    Procedure: Image guided right native kidney biopsy    Date/Time: 5/10/2023 2:49 PM    Performed by: Adriano Taylor MD  Authorized by: Adriano Taylor MD  IR Fellow Physician: Dr. English  Radiology Resident Physician: Dr. Taylor        UNIVERSAL PROTOCOL   Site Marked: NA  Prior Images Obtained and Reviewed:  Yes  Required items: Required blood products, implants, devices and special equipment available    Patient identity confirmed:  Verbally with patient, arm band, provided demographic data and hospital-assigned identification number  Patient was reevaluated immediately before administering moderate or deep sedation or anesthesia  Confirmation Checklist:  Patient's identity using two indicators, relevant allergies, procedure was appropriate and matched the consent or emergent situation and correct equipment/implants were available  Time out: Immediately prior to the procedure a time out was called    Universal Protocol: the Joint Commission Universal Protocol was followed    Preparation: Patient was prepped and draped in usual sterile fashion       ANESTHESIA    Anesthesia: Local infiltration  Local Anesthetic:  Lidocaine 1% without epinephrine  Anesthetic Total (mL):  10      SEDATION  Patient Sedated: Yes    Sedation Type:  Moderate (conscious) sedation  Vital signs: Vital signs monitored during sedation    See dictated procedure note for full details.  Findings: Succesful    Specimens: none    Complications: None    Condition: Stable      PROCEDURE    Patient Tolerance:  Patient tolerated the procedure well with no immediate complications  Length of time physician/provider present for 1:1 monitoring during sedation: 30

## 2023-05-10 NOTE — PROGRESS NOTES
Return to 2A #8 following IR renal biopsy. Food and fluids provided. No complaints. No further needs.

## 2023-05-11 ENCOUNTER — ONCOLOGY VISIT (OUTPATIENT)
Dept: ONCOLOGY | Facility: CLINIC | Age: 58
End: 2023-05-11
Attending: INTERNAL MEDICINE
Payer: COMMERCIAL

## 2023-05-11 VITALS
OXYGEN SATURATION: 98 % | SYSTOLIC BLOOD PRESSURE: 116 MMHG | BODY MASS INDEX: 27.46 KG/M2 | RESPIRATION RATE: 16 BRPM | HEIGHT: 70 IN | HEART RATE: 91 BPM | TEMPERATURE: 98.6 F | WEIGHT: 191.8 LBS | DIASTOLIC BLOOD PRESSURE: 69 MMHG

## 2023-05-11 DIAGNOSIS — D64.9 ANEMIA, UNSPECIFIED TYPE: Primary | ICD-10-CM

## 2023-05-11 DIAGNOSIS — N17.9 AKI (ACUTE KIDNEY INJURY) (H): ICD-10-CM

## 2023-05-11 PROCEDURE — G0463 HOSPITAL OUTPT CLINIC VISIT: HCPCS | Performed by: INTERNAL MEDICINE

## 2023-05-11 PROCEDURE — 99214 OFFICE O/P EST MOD 30 MIN: CPT | Performed by: INTERNAL MEDICINE

## 2023-05-11 RX ORDER — TORSEMIDE 20 MG/1
20 TABLET ORAL
COMMUNITY
Start: 2023-05-10 | End: 2024-05-09

## 2023-05-11 ASSESSMENT — PAIN SCALES - GENERAL: PAINLEVEL: NO PAIN (0)

## 2023-05-11 NOTE — LETTER
5/11/2023         RE: Grey Candelario  [address]  [facility] [address]        Dear Colleague,    Thank you for referring your patient, Grey Candelario, to the [facility]. Please see a copy of my visit note below.    [facility]    Hematology/Oncology Established Patient Follow-up Note    Treatment Summary:      Today's Date: May 11, 2023    Reason for Follow-up: Progressive anemia      HISTORY OF PRESENT ILLNESS: Grey Candelario is a 57 year old male with past medical history CKD, aortic stenosis, hypertension, hyperlipidemia, peripheral vascular disease, and chronic anemia was recently admitted with BERNARD.  Hematology was consulted for progressive anemia.     Record review shows patient has had previous history of normal hemoglobin dating back to 2019.  Acutely, hemoglobin is 7.2.  He is being transfused 1 unit packed red blood cell.  He and wife deny evidence of GI bleed.  Folate is 32.3, iron 64, TIBC 203, iron saturation 32%, and FOBT is negative.     He is being followed with nephrology and has had minimal improvement in renal function despite IV fluids.  There is concern that possibly his renal decline is due to hypertension or atherosclerotic related renal disease, and he is to undergo renal biopsy tomorrow 4/28/2023.      In recent months, he and wife report significant decrease in energy levels.  He denies unintentional weight loss.  No gross evidence of bleed.    He does have significant alcohol use, drinking 4-5x daily.      INTERIM HISTORY:  Patient had refused inpatient renal biopsy. This was done outpatient on 5/10/23. Urine was not collected during hospitalization.       REVIEW OF SYSTEMS:   A 14 point ROS was reviewed with pertinent positives and negatives in the HPI.       HOME MEDICATIONS:  Current Outpatient Medications   Medication Sig Dispense Refill     rosuvastatin (CRESTOR) 40 MG tablet TAKE 1 TABLET BY MOUTH ONE TIME DAILY  "90 tablet 3         ALLERGIES:  Allergies   Allergen Reactions     Morphine Hcl Hives         PAST MEDICAL HISTORY:  Past Medical History:   Diagnosis Date     Current smoker      HTN (hypertension)      Hx of heart artery stent     2018 2 stents placed     Hyperlipidemia          PAST SURGICAL HISTORY:  Past Surgical History:   Procedure Laterality Date     IR LOWER EXTREMITY ANGIOGRAM BILATERAL  2019     OTHER SURGICAL HISTORY      hip surgery , hardware in place.         SOCIAL HISTORY:  Social History     Socioeconomic History     Marital status: Single     Spouse name: Not on file     Number of children: Not on file     Years of education: Not on file     Highest education level: Not on file   Occupational History     Not on file   Tobacco Use     Smoking status: Every Day     Packs/day: 1.00     Types: Cigarettes     Smokeless tobacco: Never   Vaping Use     Vaping status: Not on file   Substance and Sexual Activity     Alcohol use: Yes     Drug use: No     Sexual activity: Not on file   Other Topics Concern     Parent/sibling w/ CABG, MI or angioplasty before 65F 55M? Not Asked   Social History Narrative     Not on file     Social Determinants of Health     Financial Resource Strain: Not on file   Food Insecurity: Not on file   Transportation Needs: Not on file   Physical Activity: Not on file   Stress: Not on file   Social Connections: Not on file   Intimate Partner Violence: Not on file   Housing Stability: Not on file         FAMILY HISTORY:  No family history on file.      PHYSICAL EXAM:  Vital signs:  /69   Pulse 91   Temp 98.6  F (37  C) (Oral)   Resp 16   Ht 1.778 m (5' 10\")   Wt 87 kg (191 lb 12.8 oz)   SpO2 98%   BMI 27.52 kg/m     ECO  GENERAL/CONSTITUTIONAL: No acute distress.  EYES: Pupils are equal, round, and react to light and accommodation. Extraocular movements intact.  No scleral icterus.  ENT/MOUTH: Neck supple. Oropharynx clear, no mucositis.  LYMPH: No anterior " cervical, posterior cervical, supraclavicular, axillary or inguinal adenopathy.   RESPIRATORY: Clear to auscultation bilaterally. No crackles or wheezing.   CARDIOVASCULAR: Regular rate and rhythm without murmurs, gallops, or rubs.  GASTROINTESTINAL: No hepatosplenomegaly, masses, or tenderness. The patient has normal bowel sounds. No guarding.  No distention.  MUSCULOSKELETAL: Warm and well-perfused, no cyanosis, clubbing, or edema.  NEUROLOGIC: Cranial nerves II-XII are intact. Alert, oriented, answers questions appropriately.  INTEGUMENTARY: No rashes or jaundice.  GAIT: Steady, does not use assistive device.      LABS:  CBC RESULTS:   Recent Labs   Lab Test 04/28/23  0904   WBC 6.0   RBC 2.64*   HGB 8.4*   HCT 25.2*   MCV 96   MCH 31.8   MCHC 33.3   RDW 15.9*   *       Recent Labs   Lab Test 04/28/23  0904 04/27/23  0803    138   POTASSIUM 3.8 3.7   CHLORIDE 112* 110*   CO2 16* 19*   ANIONGAP 13 9   GLC 97 93   BUN 21.6 27.6*   CR 2.81* 3.03*   MARY 9.2 8.9        Latest Reference Range & Units 04/27/23 10:48   Occult Blood Negative  Negative        Latest Reference Range & Units 04/26/23 05:46 04/26/23 15:07   Iron 61 - 157 ug/dL   64   Iron Binding Capacity 240 - 430 ug/dL   203 (L)   Iron Sat Index 15 - 46 %   32   Vitamin B12 232 - 1,245 pg/mL 638          Latest Reference Range & Units 04/26/23 15:07   Folate 4.6 - 34.8 ng/mL 32.3      Latest Reference Range & Units 04/27/23 16:19   Hep B Surface Agn Nonreactive  Nonreactive   Hepatitis B Surface Antibody Instrument Value <8.00 m[IU]/mL 54.52   Hepatitis B Surface Antibody  Reactive   Hepatitis C Antibody Nonreactive  Nonreactive   HIV Antigen Antibody Combo Nonreactive  Nonreactive      Latest Reference Range & Units 04/27/23 16:19   INR 0.85 - 1.15  1.09   PTT 22 - 38 Seconds 29   Fibrinogen 170 - 490 mg/dL 451     Serum M-protein (g/dL):  4/27/23: 0.0    Urine M-peak (%):    Total IgG (mg/dL), IgA (mg/dL), IgM (mg/dL):  4/27/23: 1158, 276,  135    Free kappa light chains (mg/dL), lambda (mg/dL), kappa/lambda ratio:  4/27/23: 8.27, 4.45, 1.86      PATHOLOGY:  Renal biopsy 5/10/23 pending    IMAGING:  CT A/P 4/25/23:  MUSCULOSKELETAL: Degenerative and postoperative change lumbar spine. Mild superior endplate compression T12, new relative to 2021. No concerning lytic or sclerotic osseous lesions.                                                                      IMPRESSION:   1.  No acute abdominal/pelvic findings.  2.  Normal noncontrast appearance of the pancreas. No ductal dilation or adjacent inflammation.  3.  Aside from significant renal vascular calcification, kidneys are normal in unenhanced appearance. No hydronephrosis. Mildly distended urinary bladder and mild prostatic enlargement noted.  4.  Diffuse atherosclerotic vascular calcification with bilateral common-external iliac stents and postoperative changes in both groins.  5.  Mild superior endplate compression T12 new relative to 2021.        ASSESSMENT/PLAN:   Grey Candelario is a 57 year old male with past medical history CKD, aortic stenosis, hypertension, hyperlipidemia, peripheral vascular disease, and chronic anemia was recently admitted with BERNARD.  Hematology was consulted for progressive anemia.    1) Anemia in the setting of BERNARD/CKD  -I reviewed the patient's medical history and current clinical findings.  -s/p 1U PRBC.  -Iron studies, B12, folate within normal limits.  -SPIEP was negative for M-protein, but patient does have abnormal light chains with elevated kappa of ~8 g/dL, lambda ~4 g/dL and K/L ratio of ~2.   -UPIEP was not obtained while in house.   -He underwent renal biopsy on 5/10/23 and path is pending. I did ask for congo red staining on this specimen.   -He received aranesp 100 mcg on 4/28/23.   -I had reviewed with patient and wife the possibility of an undelrying plasma cell disorder, such as amyloidosis. If renal biopsy returns negative, then will discuss PET, bone  "marrow and fat pad biopsy.   -We discussed possibly continuing with aranesp treatment, but will give only if Hb is persistently <10 due to risk of CVA/VTE.   -He continues to follow with nephrology.     2) History of aortic stenosis    3) History of PVD    4) History of HTN, HLD    5) -Awaiting renal biopsy.  -Send for UPIEP.  -Otherwise, repeat labs in 6 months prior to follow up in clinic.        Joanna Kulkarni DO  Hematology/Oncology  [facility]           Oncology Rooming Note    May 11, 2023 2:39 PM   Grey Candelario is a 57 year old male who presents for:    Chief Complaint   Patient presents with     Oncology Clinic Visit     Initial Vitals: Resp 16   Ht 1.778 m (5' 10\")   Wt 87 kg (191 lb 12.8 oz)   BMI 27.52 kg/m   Estimated body mass index is 27.52 kg/m  as calculated from the following:    Height as of this encounter: 1.778 m (5' 10\").    Weight as of this encounter: 87 kg (191 lb 12.8 oz). Body surface area is 2.07 meters squared.  No Pain (0) Comment: Data Unavailable   No LMP for male patient.  Allergies reviewed: Yes  Medications reviewed: Yes    Medications: Medication refills not needed today.  Pharmacy name entered into [employer]:    [id number] - [place], [place] - [address] [facility] [id number] [place] [facility], [place] - [address] AT  [address] [place] & [facility]    Clinical concerns: follow up      Emi Castellanos                Again, thank you for allowing me to participate in the care of your patient.        Sincerely,        Joanna Kulkarni DO    "

## 2023-05-11 NOTE — PROGRESS NOTES
"Oncology Rooming Note    May 11, 2023 2:39 PM   Grey Candelario is a 57 year old male who presents for:    Chief Complaint   Patient presents with     Oncology Clinic Visit     Initial Vitals: Resp 16   Ht 1.778 m (5' 10\")   Wt 87 kg (191 lb 12.8 oz)   BMI 27.52 kg/m   Estimated body mass index is 27.52 kg/m  as calculated from the following:    Height as of this encounter: 1.778 m (5' 10\").    Weight as of this encounter: 87 kg (191 lb 12.8 oz). Body surface area is 2.07 meters squared.  No Pain (0) Comment: Data Unavailable   No LMP for male patient.  Allergies reviewed: Yes  Medications reviewed: Yes    Medications: Medication refills not needed today.  Pharmacy name entered into [employer]:    [facility] #[id number] - [place], [place] - [address] [facility] [id number] - [place], [place] - [address] [place] & [facility]    Clinical concerns: follow up      Emi Castellanos            "

## 2023-05-11 NOTE — PROGRESS NOTES
[facility]    Hematology/Oncology Established Patient Follow-up Note    Treatment Summary:      Today's Date: May 11, 2023    Reason for Follow-up: Progressive anemia      HISTORY OF PRESENT ILLNESS: Grey Candelario is a 57 year old male with past medical history CKD, aortic stenosis, hypertension, hyperlipidemia, peripheral vascular disease, and chronic anemia was recently admitted with BERNARD.  Hematology was consulted for progressive anemia.     Record review shows patient has had previous history of normal hemoglobin dating back to 2019.  Acutely, hemoglobin is 7.2.  He is being transfused 1 unit packed red blood cell.  He and wife deny evidence of GI bleed.  Folate is 32.3, iron 64, TIBC 203, iron saturation 32%, and FOBT is negative.     He is being followed with nephrology and has had minimal improvement in renal function despite IV fluids.  There is concern that possibly his renal decline is due to hypertension or atherosclerotic related renal disease, and he is to undergo renal biopsy tomorrow 4/28/2023.      In recent months, he and wife report significant decrease in energy levels.  He denies unintentional weight loss.  No gross evidence of bleed.    He does have significant alcohol use, drinking 4-5x daily.      INTERIM HISTORY:  Patient had refused inpatient renal biopsy. This was done outpatient on 5/10/23. Urine was not collected during hospitalization.       REVIEW OF SYSTEMS:   A 14 point ROS was reviewed with pertinent positives and negatives in the HPI.       HOME MEDICATIONS:  Current Outpatient Medications   Medication Sig Dispense Refill     rosuvastatin (CRESTOR) 40 MG tablet TAKE 1 TABLET BY MOUTH ONE TIME DAILY 90 tablet 3         ALLERGIES:  Allergies   Allergen Reactions     Morphine Hcl Hives         PAST MEDICAL HISTORY:  Past Medical History:   Diagnosis Date     Current smoker      HTN (hypertension)      Hx of heart artery stent     2018 2 stents placed      "Hyperlipidemia          PAST SURGICAL HISTORY:  Past Surgical History:   Procedure Laterality Date     IR LOWER EXTREMITY ANGIOGRAM BILATERAL  2019     OTHER SURGICAL HISTORY      hip surgery , hardware in place.         SOCIAL HISTORY:  Social History     Socioeconomic History     Marital status: Single     Spouse name: Not on file     Number of children: Not on file     Years of education: Not on file     Highest education level: Not on file   Occupational History     Not on file   Tobacco Use     Smoking status: Every Day     Packs/day: 1.00     Types: Cigarettes     Smokeless tobacco: Never   Vaping Use     Vaping status: Not on file   Substance and Sexual Activity     Alcohol use: Yes     Drug use: No     Sexual activity: Not on file   Other Topics Concern     Parent/sibling w/ CABG, MI or angioplasty before 65F 55M? Not Asked   Social History Narrative     Not on file     Social Determinants of Health     Financial Resource Strain: Not on file   Food Insecurity: Not on file   Transportation Needs: Not on file   Physical Activity: Not on file   Stress: Not on file   Social Connections: Not on file   Intimate Partner Violence: Not on file   Housing Stability: Not on file         FAMILY HISTORY:  No family history on file.      PHYSICAL EXAM:  Vital signs:  /69   Pulse 91   Temp 98.6  F (37  C) (Oral)   Resp 16   Ht 1.778 m (5' 10\")   Wt 87 kg (191 lb 12.8 oz)   SpO2 98%   BMI 27.52 kg/m     ECO  GENERAL/CONSTITUTIONAL: No acute distress.  EYES: Pupils are equal, round, and react to light and accommodation. Extraocular movements intact.  No scleral icterus.  ENT/MOUTH: Neck supple. Oropharynx clear, no mucositis.  LYMPH: No anterior cervical, posterior cervical, supraclavicular, axillary or inguinal adenopathy.   RESPIRATORY: Clear to auscultation bilaterally. No crackles or wheezing.   CARDIOVASCULAR: Regular rate and rhythm without murmurs, gallops, or rubs.  GASTROINTESTINAL: No " hepatosplenomegaly, masses, or tenderness. The patient has normal bowel sounds. No guarding.  No distention.  MUSCULOSKELETAL: Warm and well-perfused, no cyanosis, clubbing, or edema.  NEUROLOGIC: Cranial nerves II-XII are intact. Alert, oriented, answers questions appropriately.  INTEGUMENTARY: No rashes or jaundice.  GAIT: Steady, does not use assistive device.      LABS:  CBC RESULTS:   Recent Labs   Lab Test 04/28/23  0904   WBC 6.0   RBC 2.64*   HGB 8.4*   HCT 25.2*   MCV 96   MCH 31.8   MCHC 33.3   RDW 15.9*   *       Recent Labs   Lab Test 04/28/23  0904 04/27/23  0803    138   POTASSIUM 3.8 3.7   CHLORIDE 112* 110*   CO2 16* 19*   ANIONGAP 13 9   GLC 97 93   BUN 21.6 27.6*   CR 2.81* 3.03*   MARY 9.2 8.9        Latest Reference Range & Units 04/27/23 10:48   Occult Blood Negative  Negative        Latest Reference Range & Units 04/26/23 05:46 04/26/23 15:07   Iron 61 - 157 ug/dL   64   Iron Binding Capacity 240 - 430 ug/dL   203 (L)   Iron Sat Index 15 - 46 %   32   Vitamin B12 232 - 1,245 pg/mL 638          Latest Reference Range & Units 04/26/23 15:07   Folate 4.6 - 34.8 ng/mL 32.3      Latest Reference Range & Units 04/27/23 16:19   Hep B Surface Agn Nonreactive  Nonreactive   Hepatitis B Surface Antibody Instrument Value <8.00 m[IU]/mL 54.52   Hepatitis B Surface Antibody  Reactive   Hepatitis C Antibody Nonreactive  Nonreactive   HIV Antigen Antibody Combo Nonreactive  Nonreactive      Latest Reference Range & Units 04/27/23 16:19   INR 0.85 - 1.15  1.09   PTT 22 - 38 Seconds 29   Fibrinogen 170 - 490 mg/dL 451     Serum M-protein (g/dL):  4/27/23: 0.0    Urine M-peak (%):    Total IgG (mg/dL), IgA (mg/dL), IgM (mg/dL):  4/27/23: 1158, 276, 135    Free kappa light chains (mg/dL), lambda (mg/dL), kappa/lambda ratio:  4/27/23: 8.27, 4.45, 1.86      PATHOLOGY:  Renal biopsy 5/10/23 pending    IMAGING:  CT A/P 4/25/23:  MUSCULOSKELETAL: Degenerative and postoperative change lumbar spine. Mild  superior endplate compression T12, new relative to 2021. No concerning lytic or sclerotic osseous lesions.                                                                      IMPRESSION:   1.  No acute abdominal/pelvic findings.  2.  Normal noncontrast appearance of the pancreas. No ductal dilation or adjacent inflammation.  3.  Aside from significant renal vascular calcification, kidneys are normal in unenhanced appearance. No hydronephrosis. Mildly distended urinary bladder and mild prostatic enlargement noted.  4.  Diffuse atherosclerotic vascular calcification with bilateral common-external iliac stents and postoperative changes in both groins.  5.  Mild superior endplate compression T12 new relative to 2021.        ASSESSMENT/PLAN:   Grey Candelario is a 57 year old male with past medical history CKD, aortic stenosis, hypertension, hyperlipidemia, peripheral vascular disease, and chronic anemia was recently admitted with BERNARD.  Hematology was consulted for progressive anemia.    1) Anemia in the setting of BERNARD/CKD  -I reviewed the patient's medical history and current clinical findings.  -s/p 1U PRBC.  -Iron studies, B12, folate within normal limits.  -SPIEP was negative for M-protein, but patient does have abnormal light chains with elevated kappa of ~8 g/dL, lambda ~4 g/dL and K/L ratio of ~2.   -UPIEP was not obtained while in house.   -He underwent renal biopsy on 5/10/23 and path is pending. I did ask for congo red staining on this specimen.   -He received aranesp 100 mcg on 4/28/23.   -I had reviewed with patient and wife the possibility of an undelrying plasma cell disorder, such as amyloidosis. If renal biopsy returns negative, then will discuss PET, bone marrow and fat pad biopsy.   -We discussed possibly continuing with aranesp treatment, but will give only if Hb is persistently <10 due to risk of CVA/VTE.   -He continues to follow with nephrology.     2) History of aortic stenosis    3) History of  PVD    4) History of HTN, HLD    5) -Awaiting renal biopsy.  -Send for UPIEP.  -Otherwise, repeat labs in 6 months prior to follow up in clinic.        Joanna Kulkarni DO  Hematology/Oncology  [facility]

## 2023-05-12 LAB
PATH REPORT.COMMENTS IMP SPEC: NORMAL
PATH REPORT.FINAL DX SPEC: NORMAL
PATH REPORT.GROSS SPEC: NORMAL
PATH REPORT.MICROSCOPIC SPEC OTHER STN: NORMAL
PATH REPORT.RELEVANT HX SPEC: NORMAL
PHOTO IMAGE: NORMAL

## 2023-05-17 ENCOUNTER — LAB (OUTPATIENT)
Dept: LAB | Facility: CLINIC | Age: 58
End: 2023-05-17
Payer: COMMERCIAL

## 2023-05-17 DIAGNOSIS — N17.9 AKI (ACUTE KIDNEY INJURY) (H): ICD-10-CM

## 2023-05-17 DIAGNOSIS — D64.9 ANEMIA, UNSPECIFIED TYPE: ICD-10-CM

## 2023-05-17 PROCEDURE — 84166 PROTEIN E-PHORESIS/URINE/CSF: CPT | Mod: 26

## 2023-05-17 PROCEDURE — 84166 PROTEIN E-PHORESIS/URINE/CSF: CPT | Performed by: STUDENT IN AN ORGANIZED HEALTH CARE EDUCATION/TRAINING PROGRAM

## 2023-05-17 PROCEDURE — 86335 IMMUNFIX E-PHORSIS/URINE/CSF: CPT | Performed by: STUDENT IN AN ORGANIZED HEALTH CARE EDUCATION/TRAINING PROGRAM

## 2023-05-17 PROCEDURE — 86335 IMMUNFIX E-PHORSIS/URINE/CSF: CPT | Mod: 26

## 2023-05-19 LAB
PROT ELPH PNL UR ELPH: NORMAL
PROT PATTERN UR ELPH-IMP: NORMAL

## 2023-06-01 ENCOUNTER — HEALTH MAINTENANCE LETTER (OUTPATIENT)
Age: 58
End: 2023-06-01

## 2023-06-14 ENCOUNTER — TELEPHONE (OUTPATIENT)
Dept: GASTROENTEROLOGY | Facility: CLINIC | Age: 58
End: 2023-06-14
Payer: COMMERCIAL

## 2023-06-14 NOTE — TELEPHONE ENCOUNTER
YEFRI, sent mychart for patient to reschedule:    10/11/23 appointment with Dr. Cavanaugh as provider is no longer available. Reschedule in next avail with either Dr. Cavanaugh or Dr. Ernst.    CC number left.

## 2023-11-14 ENCOUNTER — PATIENT OUTREACH (OUTPATIENT)
Dept: ONCOLOGY | Facility: CLINIC | Age: 58
End: 2023-11-14
Payer: COMMERCIAL

## 2023-11-14 NOTE — LETTER
Grey Candelario  [address] [facility] [address]          November 14, 2023    Dear Grey:    Our clinic records indicate we have attempted to contact you three times to schedule a follow up appointment with Dr. Joanna Kulkarni. Unfortunately, we have been unable to reach you. To prevent further delays in your care, please contact our office to schedule your appointment.      Sincerely,       [facility]   [phone]

## 2023-11-14 NOTE — PROGRESS NOTES
Our scheduling team have tried to contact Grey three times to schedule a follow up with Dr. Kulkarni. We have been unable to reach him. Writer will send a letter to his home with instructions on scheduling.    Paulina Boyle RN on 11/14/2023 at 4:13 PM

## 2024-04-28 NOTE — TELEPHONE ENCOUNTER
Left message requesting that patient call [facility] to schedule 1 month follow-up appointment to 11/14/19 with Dr. Castillo.     Fanta Roberts    Appointment   [facility]  Office: [phone]  Fax: [phone]       91

## 2024-06-02 ENCOUNTER — HEALTH MAINTENANCE LETTER (OUTPATIENT)
Age: 59
End: 2024-06-02

## 2024-10-29 ENCOUNTER — TRANSFERRED RECORDS (OUTPATIENT)
Dept: HEALTH INFORMATION MANAGEMENT | Facility: CLINIC | Age: 59
End: 2024-10-29
Payer: COMMERCIAL

## 2024-11-12 ENCOUNTER — TRANSFERRED RECORDS (OUTPATIENT)
Dept: HEALTH INFORMATION MANAGEMENT | Facility: CLINIC | Age: 59
End: 2024-11-12
Payer: COMMERCIAL

## 2024-11-18 ENCOUNTER — TELEPHONE (OUTPATIENT)
Dept: CARDIOLOGY | Facility: CLINIC | Age: 59
End: 2024-11-18
Payer: COMMERCIAL

## 2024-11-18 NOTE — TELEPHONE ENCOUNTER
"Writer placed call to Grey to try and get more information about why he is coming in to see Dr. Church tomorrow.    Grey says \"I need a valve replacement, and I need to have the surgery before the end of March 2025 because I don't know what insurance I will have after that\".       Grey says he had testing done at [facility], but we do not  see any results from [facility] for recent cardiac testing.    Last Echocardiogram in  system 4\25\23.  The report states:  The aortic valve is not well visualized. It is moderately thickened and  calcified. Mild to moderate valvular aortic stenosis. The mean AoV pressure  gradient is 19mmHg    Patient absolutely wants to keep his appointment with Dr. Church tomorrow and does not want to reschedule after test results.    Barbara Rizzo RN on 11/18/2024 at 2:06 PM    "

## 2024-11-19 ENCOUNTER — OFFICE VISIT (OUTPATIENT)
Dept: CARDIOLOGY | Facility: CLINIC | Age: 59
End: 2024-11-19
Payer: COMMERCIAL

## 2024-11-19 VITALS
HEART RATE: 78 BPM | DIASTOLIC BLOOD PRESSURE: 78 MMHG | BODY MASS INDEX: 30.19 KG/M2 | OXYGEN SATURATION: 96 % | SYSTOLIC BLOOD PRESSURE: 153 MMHG | HEIGHT: 70 IN | WEIGHT: 210.9 LBS

## 2024-11-19 DIAGNOSIS — I35.0 NONRHEUMATIC AORTIC VALVE STENOSIS: Primary | ICD-10-CM

## 2024-11-19 RX ORDER — LOSARTAN POTASSIUM AND HYDROCHLOROTHIAZIDE 12.5; 5 MG/1; MG/1
1 TABLET ORAL DAILY
COMMUNITY

## 2024-11-19 RX ORDER — AMLODIPINE BESYLATE 5 MG/1
5 TABLET ORAL DAILY
COMMUNITY
Start: 2024-10-03 | End: 2024-11-19

## 2024-11-19 RX ORDER — CHOLECALCIFEROL (VITAMIN D3) 50 MCG
TABLET ORAL
COMMUNITY

## 2024-11-19 RX ORDER — METOPROLOL SUCCINATE 50 MG/1
50 TABLET, EXTENDED RELEASE ORAL DAILY
Qty: 30 TABLET | Refills: 3 | Status: SHIPPED | OUTPATIENT
Start: 2024-11-19

## 2024-11-19 NOTE — LETTER
11/19/2024    Santosh Ortega MD  [address]  [place] [address]    RE: Grey Vilaemily       Dear Colleague,     I had the pleasure of seeing Grey Candelario in the [facility].  HPI and Plan:   It is my pleasure to see this 59-year-old gentleman, accompanied by his significant other for evaluation of chest pain and aortic stenosis    Gentleman has been receiving his health care at multiple institutions including [facility] and [facility] nephrology and significant mild time was spent reviewing his medical records    Cardiac point of view this gentleman has known about significant aortic stenosis since 2022.  At that time he had a dobutamine echocardiogram which did not show significant inducible ischemia with preserved left ventricular systolic function.  However aortic valve area was calculated at 1.0 cm  with a mean gradient of 36 mmHg, dimensionless index of 0.25.  He was advised to consider aortic valve intervention at that time.    He has 80-pack-year history and gave up smoking 3 years ago.    He has severe peripheral arterial disease.  CT coronary angiography from 2022 demonstrated patent lower abdominal aortic bilateral external iliac artery stents.  However he had severe disease in his right common and superficial femoral arteries and severe left superficial femoral artery disease and he underwent femoral endarterectomy.  Currently he has claudication distance of only 1 block.    He is not diabetic but has longstanding hypertension.  Family history is negative for premature atherosclerotic disease.    Concern to him is his chronic renal failure.  I am happy to see that most recent creatinine is 1.48 with a GFR estimate of 53.  He does have a history of acute on chronic renal failure which occurred in the setting of use of CT contrast.  I do see a creatinine of 3.42 at 1 stage and I am happy to see that he has significantly recovered    He has no exertional dizzy  spells or syncopal episodes but for the past 2 months he has had constant diffuse chest discomfort which she does not feel was worse on exertion.  However as noted above he has a claudication distance of 1 block.  Denies shortness of breath    States she was evaluated for anemia and this was thought to be due to acute on chronic renal failure.  I am happy to see that recent hemoglobin is normal.    Cardiac examination reveals severely diminished foot pulses.  He has bilateral femoral bruits soft bilateral carotid bruits though I cannot be sure that the bruit is the result of transmitted systolic murmur.  He does have 2 out of 6 ejection systolic murmur though second heart sound is only mildly diminished.  Otherwise his chest is clear JVP is not elevated.    Impression  1.  Chest pains with serious concerns for angina.  2.  Severe peripheral arterial disease with 1 block claudication.  3.  Severe aortic stenosis.  4.  Chronic alcoholism with at least 4-5 alcoholic drinks a day.  Advising no uncertain terms and needs to cut down.  5.  Stage II chronic renal failure with history of BERNARD secondary to intravenous contrast.    We had an extensive discussion regarding the use of intravenous contrast.  With his history of severe peripheral arterial disease there is at least 90% likelihood that he has severe underlying coronary artery disease.  Patient has severe aortic stenosis as described by the [facility] from 2 years ago    Essentially I told he and his wife that there is no way that we can intervene on his heart valves and work on his coronary arteries without doing invasive coronary angiography.  They understand and would like to speak with the nephrologist first.  They will get back to me shortly    In the meantime they would like to take a 3-week vacation to [place] with her son lives.  I advised him that should he have any worsening chest discomfort he should go to the emergency room right away.    His medications  I am surprised to see that he is not on an aspirin.  This is something I will restart right away.    His blood pressure is mildly elevated and heart rate is close to 80.  He is not on a beta-blocker and I will stop this both for hypertension and presumed angina    Obviously I have strongly recommended coronary angiography and I look forward to hearing back from them.    Over 60 minutes today spent pre and post charting, reviewing pertinent previous records as well as imaging studies personally and discussing and coordinating further evaluation for the patient's symptoms.   Orders Placed This Encounter   Procedures     Echocardiogram Complete       Orders Placed This Encounter   Medications     DISCONTD: amLODIPine (NORVASC) 5 MG tablet     Sig: Take 5 mg by mouth daily.     losartan-hydrochlorothiazide (HYZAAR) 50-12.5 MG tablet     Si tablet daily.     vitamin D3 (CHOLECALCIFEROL) 50 mcg (2000 units) tablet     empagliflozin (JARDIANCE) 10 MG TABS tablet     Sig: Take 10 mg by mouth.     omeprazole (PRILOSEC) 20 MG DR capsule     metoprolol succinate ER (TOPROL XL) 50 MG 24 hr tablet     Sig: Take 1 tablet (50 mg) by mouth daily.     Dispense:  30 tablet     Refill:  3       Encounter Diagnosis   Name Primary?     Nonrheumatic aortic valve stenosis Yes       CURRENT MEDICATIONS:  Current Outpatient Medications   Medication Sig Dispense Refill     empagliflozin (JARDIANCE) 10 MG TABS tablet Take 10 mg by mouth.       losartan-hydrochlorothiazide (HYZAAR) 50-12.5 MG tablet 1 tablet daily.       metoprolol succinate ER (TOPROL XL) 50 MG 24 hr tablet Take 1 tablet (50 mg) by mouth daily. 30 tablet 3     omeprazole (PRILOSEC) 20 MG DR capsule        rosuvastatin (CRESTOR) 40 MG tablet TAKE 1 TABLET BY MOUTH ONE TIME DAILY 90 tablet 3     vitamin D3 (CHOLECALCIFEROL) 50 mcg (2000 units) tablet          ALLERGIES     Allergies   Allergen Reactions     Morphine Hcl Hives       PAST MEDICAL HISTORY:  Past Medical  History:   Diagnosis Date     Current smoker      HTN (hypertension)      Hx of heart artery stent     2018 2 stents placed     Hyperlipidemia        PAST SURGICAL HISTORY:  Past Surgical History:   Procedure Laterality Date     IR LOWER EXTREMITY ANGIOGRAM BILATERAL  11/14/2019     IR RENAL BIOPSY RIGHT  5/10/2023     OTHER SURGICAL HISTORY      hip surgery , hardware in place.       FAMILY HISTORY:  No family history on file.    SOCIAL HISTORY:  Social History     Socioeconomic History     Marital status: Single   Tobacco Use     Smoking status: Every Day     Current packs/day: 1.00     Types: Cigarettes     Smokeless tobacco: Never   Substance and Sexual Activity     Alcohol use: Yes     Drug use: No     Social Drivers of Health      Received from [facility]    Overall Financial Resource Strain (CARDIA)    Received from [facility]    Hunger Vital Sign   Transportation Needs: No Transportation Needs (10/5/2022)    Received from [facility]    PRAPARE - Transportation      Lack of Transportation (Medical): No      Lack of Transportation (Non-Medical): No   Physical Activity: Inactive (10/5/2022)    Received from [facility]    Exercise Vital Sign      Days of Exercise per Week: 0 days      Minutes of Exercise per Session: 0 min    Received from [facility]    [place] [employer] - Occupational Stress Questionnaire    Received from [facility]    Social Connection and Isolation Panel [NHANES]   Interpersonal Safety: Not At Risk (10/5/2022)    Received from [facility]    Humiliation, Afraid, Rape, and Kick questionnaire      Fear of Current or Ex-Partner: No      Emotionally Abused: No      Physically Abused: No      Sexually Abused: No    Received from [facility]    Housing Stability Vital Sign       Review of Systems:  Skin:        Eyes:  Positive for glasses  ENT:       Respiratory:  Positive for wheezing  Cardiovascular:  palpitations;syncope or near-syncope;cyanosis;edema Positive  "for;chest pain;lightheadedness;dizziness;fatigue;exercise intolerance  Gastroenterology: Positive for reflux  Genitourinary:  not assessed    Musculoskeletal:  Positive for joint pain;back pain  Neurologic:  Positive for headaches  Psychiatric:  Positive for anxiety  Heme/Lymph/Imm:  Negative    Endocrine:  Negative      Physical Exam:  Vitals: BP (!) 153/78   Pulse 78   Ht 1.765 m (5' 9.5\")   Wt 95.7 kg (210 lb 14.4 oz)   SpO2 96%   BMI 30.70 kg/m      Constitutional:  cooperative, alert and oriented, well developed, well nourished, in no acute distress        Skin:  warm and dry to the touch, no apparent skin lesions or masses noted          Head:  normocephalic, no masses or lesions        Eyes:  conjunctivae and lids unremarkable        Lymph:No Cervical lymphadenopathy present     ENT:  no pallor or cyanosis, dentition good        Neck:    bilateral carotid bruit      Respiratory:  normal breath sounds, clear to auscultation, normal A-P diameter, normal symmetry, normal respiratory excursion, no use of accessory muscles         Cardiac: regular rhythm;normal S1 and S2;apical impulse not displaced       systolic murmur;grade 2          pulses below the femoral arteries are diminished                             right femoral bruit (+) left femoral bruit (+)      GI:  abdomen soft, non-tender, BS normoactive, no mass, no HSM, no bruits        Extremities and Muscular Skeletal:  no deformities, clubbing, cyanosis, erythema observed              Neurological:  no gross motor deficits        Psych:  Alert and Oriented x 3        Recent Lab Results:  LIPID RESULTS:  Lab Results   Component Value Date    CHOL 184 11/14/2019    HDL 79 11/14/2019    LDL 90 11/14/2019    TRIG 159 (H) 04/20/2023    TRIG 77 11/14/2019       LIVER ENZYME RESULTS:  Lab Results   Component Value Date    AST 26 04/27/2023    AST 22 11/10/2017    ALT 17 04/27/2023    ALT 11 11/10/2017       CBC RESULTS:  Lab Results   Component Value Date "    WBC 6.0 04/28/2023    WBC 8.7 11/14/2019    RBC 2.64 (L) 04/28/2023    RBC 4.14 (L) 11/14/2019    HGB 8.4 (L) 04/28/2023    HGB 14.0 11/14/2019    HCT 25.2 (L) 04/28/2023    HCT 40.2 11/14/2019    MCV 96 04/28/2023    MCV 97 11/14/2019    MCH 31.8 04/28/2023    MCH 33.8 (H) 11/14/2019    MCHC 33.3 04/28/2023    MCHC 34.8 11/14/2019    RDW 15.9 (H) 04/28/2023    RDW 12.4 11/14/2019     (L) 04/28/2023     11/14/2019       BMP RESULTS:  Lab Results   Component Value Date     04/28/2023     08/10/2018    POTASSIUM 3.8 04/28/2023    POTASSIUM 3.9 08/10/2018    CHLORIDE 112 (H) 04/28/2023    CHLORIDE 101 08/10/2018    CO2 16 (L) 04/28/2023    CO2 25 08/10/2018    ANIONGAP 13 04/28/2023    ANIONGAP 10 08/10/2018    GLC 97 04/28/2023    GLC 83 04/25/2023    GLC 88 08/10/2018    BUN 21.6 04/28/2023    BUN 15 08/10/2018    CR 2.81 (H) 04/28/2023    CR 0.84 11/14/2019    GFRESTIMATED 25 (L) 04/28/2023    GFRESTIMATED >90 11/14/2019    GFRESTBLACK >90 11/14/2019    MARY 9.2 04/28/2023    MARY 8.9 08/10/2018        A1C RESULTS:  Lab Results   Component Value Date    A1C 5.2 11/14/2019       INR RESULTS:  Lab Results   Component Value Date    INR 1.09 04/27/2023    INR 1.06 04/26/2023    INR 0.94 11/14/2019    INR 0.85 (L) 11/16/2018           CC  Santosh Ortega MD  [address]  [place] [address]                     Thank you for allowing me to participate in the care of your patient.      Sincerely,     DR JOSE JENNINGS MD     [facility]  cc:   Santosh Ortega MD  [address] [address]

## 2024-11-19 NOTE — PROGRESS NOTES
HPI and Plan:   It is my pleasure to see this 59-year-old gentleman, accompanied by his significant other for evaluation of chest pain and aortic stenosis    Gentleman has been receiving his health care at multiple institutions including ours and [facility] and [facility] nephrology and significant mild time was spent reviewing his medical records    Cardiac point of view this gentleman has known about significant aortic stenosis since 2022.  At that time he had a dobutamine echocardiogram which did not show significant inducible ischemia with preserved left ventricular systolic function.  However aortic valve area was calculated at 1.0 cm  with a mean gradient of 36 mmHg, dimensionless index of 0.25.  He was advised to consider aortic valve intervention at that time.    He has 80-pack-year history and gave up smoking 3 years ago.    He has severe peripheral arterial disease.  CT coronary angiography from 2022 demonstrated patent lower abdominal aortic bilateral external iliac artery stents.  However he had severe disease in his right common and superficial femoral arteries and severe left superficial femoral artery disease and he underwent femoral endarterectomy.  Currently he has claudication distance of only 1 block.    He is not diabetic but has longstanding hypertension.  Family history is negative for premature atherosclerotic disease.    Concern to him is his chronic renal failure.  I am happy to see that most recent creatinine is 1.48 with a GFR estimate of 53.  He does have a history of acute on chronic renal failure which occurred in the setting of use of CT contrast.  I do see a creatinine of 3.42 at 1 stage and I am happy to see that he has significantly recovered    He has no exertional dizzy spells or syncopal episodes but for the past 2 months he has had constant diffuse chest discomfort which she does not feel was worse on exertion.  However as noted above he has a claudication distance of 1 block.   Denies shortness of breath    States she was evaluated for anemia and this was thought to be due to acute on chronic renal failure.  I am happy to see that recent hemoglobin is normal.    Cardiac examination reveals severely diminished foot pulses.  He has bilateral femoral bruits soft bilateral carotid bruits though I cannot be sure that the bruit is the result of transmitted systolic murmur.  He does have 2 out of 6 ejection systolic murmur though second heart sound is only mildly diminished.  Otherwise his chest is clear JVP is not elevated.    Impression  1.  Chest pains with serious concerns for angina.  2.  Severe peripheral arterial disease with 1 block claudication.  3.  Severe aortic stenosis.  4.  Chronic alcoholism with at least 4-5 alcoholic drinks a day.  Advising no uncertain terms and needs to cut down.  5.  Stage II chronic renal failure with history of BERNARD secondary to intravenous contrast.    We had an extensive discussion regarding the use of intravenous contrast.  With his history of severe peripheral arterial disease there is at least 90% likelihood that he has severe underlying coronary artery disease.  Patient has severe aortic stenosis as described by the [facility] from 2 years ago    Essentially I told he and his wife that there is no way that we can intervene on his heart valves and work on his coronary arteries without doing invasive coronary angiography.  They understand and would like to speak with the nephrologist first.  They will get back to me shortly    In the meantime they would like to take a 3-week vacation to [place] with her son lives.  I advised him that should he have any worsening chest discomfort he should go to the emergency room right away.    His medications I am surprised to see that he is not on an aspirin.  This is something I will restart right away.    His blood pressure is mildly elevated and heart rate is close to 80.  He is not on a beta-blocker and I will stop  this both for hypertension and presumed angina    Obviously I have strongly recommended coronary angiography and I look forward to hearing back from them.    Over 60 minutes today spent pre and post charting, reviewing pertinent previous records as well as imaging studies personally and discussing and coordinating further evaluation for the patient's symptoms.   Orders Placed This Encounter   Procedures    Echocardiogram Complete       Orders Placed This Encounter   Medications    DISCONTD: amLODIPine (NORVASC) 5 MG tablet     Sig: Take 5 mg by mouth daily.    losartan-hydrochlorothiazide (HYZAAR) 50-12.5 MG tablet     Si tablet daily.    vitamin D3 (CHOLECALCIFEROL) 50 mcg (2000 units) tablet    empagliflozin (JARDIANCE) 10 MG TABS tablet     Sig: Take 10 mg by mouth.    omeprazole (PRILOSEC) 20 MG DR capsule    metoprolol succinate ER (TOPROL XL) 50 MG 24 hr tablet     Sig: Take 1 tablet (50 mg) by mouth daily.     Dispense:  30 tablet     Refill:  3       Encounter Diagnosis   Name Primary?    Nonrheumatic aortic valve stenosis Yes       CURRENT MEDICATIONS:  Current Outpatient Medications   Medication Sig Dispense Refill    empagliflozin (JARDIANCE) 10 MG TABS tablet Take 10 mg by mouth.      losartan-hydrochlorothiazide (HYZAAR) 50-12.5 MG tablet 1 tablet daily.      metoprolol succinate ER (TOPROL XL) 50 MG 24 hr tablet Take 1 tablet (50 mg) by mouth daily. 30 tablet 3    omeprazole (PRILOSEC) 20 MG DR capsule       rosuvastatin (CRESTOR) 40 MG tablet TAKE 1 TABLET BY MOUTH ONE TIME DAILY 90 tablet 3    vitamin D3 (CHOLECALCIFEROL) 50 mcg (2000 units) tablet          ALLERGIES     Allergies   Allergen Reactions    Morphine Hcl Hives       PAST MEDICAL HISTORY:  Past Medical History:   Diagnosis Date    Current smoker     HTN (hypertension)     Hx of heart artery stent     2018 2 stents placed    Hyperlipidemia        PAST SURGICAL HISTORY:  Past Surgical History:   Procedure Laterality Date    IR LOWER  EXTREMITY ANGIOGRAM BILATERAL  11/14/2019    IR RENAL BIOPSY RIGHT  5/10/2023    OTHER SURGICAL HISTORY      hip surgery , hardware in place.       FAMILY HISTORY:  No family history on file.    SOCIAL HISTORY:  Social History     Socioeconomic History    Marital status: Single   Tobacco Use    Smoking status: Every Day     Current packs/day: 1.00     Types: Cigarettes    Smokeless tobacco: Never   Substance and Sexual Activity    Alcohol use: Yes    Drug use: No     Social Drivers of Health      Received from [facility]    Overall Financial Resource Strain (CARDIA)    Received from [facility]    Hunger Vital Sign   Transportation Needs: No Transportation Needs (10/5/2022)    Received from [facility]    PRAPARE - Transportation     Lack of Transportation (Medical): No     Lack of Transportation (Non-Medical): No   Physical Activity: Inactive (10/5/2022)    Received from [facility]    Exercise Vital Sign     Days of Exercise per Week: 0 days     Minutes of Exercise per Session: 0 min    Received from [facility]    [place] [employer] - Occupational Stress Questionnaire    Received from [facility]    Social Connection and Isolation Panel [NHANES]   Interpersonal Safety: Not At Risk (10/5/2022)    Received from [facility]    Humiliation, Afraid, Rape, and Kick questionnaire     Fear of Current or Ex-Partner: No     Emotionally Abused: No     Physically Abused: No     Sexually Abused: No    Received from [facility]    Housing Stability Vital Sign       Review of Systems:  Skin:        Eyes:  Positive for glasses  ENT:       Respiratory:  Positive for wheezing  Cardiovascular:  palpitations;syncope or near-syncope;cyanosis;edema Positive for;chest pain;lightheadedness;dizziness;fatigue;exercise intolerance  Gastroenterology: Positive for reflux  Genitourinary:  not assessed    Musculoskeletal:  Positive for joint pain;back pain  Neurologic:  Positive for headaches  Psychiatric:  Positive for  "anxiety  Heme/Lymph/Imm:  Negative    Endocrine:  Negative      Physical Exam:  Vitals: BP (!) 153/78   Pulse 78   Ht 1.765 m (5' 9.5\")   Wt 95.7 kg (210 lb 14.4 oz)   SpO2 96%   BMI 30.70 kg/m      Constitutional:  cooperative, alert and oriented, well developed, well nourished, in no acute distress        Skin:  warm and dry to the touch, no apparent skin lesions or masses noted          Head:  normocephalic, no masses or lesions        Eyes:  conjunctivae and lids unremarkable        Lymph:No Cervical lymphadenopathy present     ENT:  no pallor or cyanosis, dentition good        Neck:    bilateral carotid bruit      Respiratory:  normal breath sounds, clear to auscultation, normal A-P diameter, normal symmetry, normal respiratory excursion, no use of accessory muscles         Cardiac: regular rhythm;normal S1 and S2;apical impulse not displaced       systolic murmur;grade 2          pulses below the femoral arteries are diminished                             right femoral bruit (+) left femoral bruit (+)      GI:  abdomen soft, non-tender, BS normoactive, no mass, no HSM, no bruits        Extremities and Muscular Skeletal:  no deformities, clubbing, cyanosis, erythema observed              Neurological:  no gross motor deficits        Psych:  Alert and Oriented x 3        Recent Lab Results:  LIPID RESULTS:  Lab Results   Component Value Date    CHOL 184 11/14/2019    HDL 79 11/14/2019    LDL 90 11/14/2019    TRIG 159 (H) 04/20/2023    TRIG 77 11/14/2019       LIVER ENZYME RESULTS:  Lab Results   Component Value Date    AST 26 04/27/2023    AST 22 11/10/2017    ALT 17 04/27/2023    ALT 11 11/10/2017       CBC RESULTS:  Lab Results   Component Value Date    WBC 6.0 04/28/2023    WBC 8.7 11/14/2019    RBC 2.64 (L) 04/28/2023    RBC 4.14 (L) 11/14/2019    HGB 8.4 (L) 04/28/2023    HGB 14.0 11/14/2019    HCT 25.2 (L) 04/28/2023    HCT 40.2 11/14/2019    MCV 96 04/28/2023    MCV 97 11/14/2019    MCH 31.8 " 04/28/2023    MCH 33.8 (H) 11/14/2019    MCHC 33.3 04/28/2023    MCHC 34.8 11/14/2019    RDW 15.9 (H) 04/28/2023    RDW 12.4 11/14/2019     (L) 04/28/2023     11/14/2019       BMP RESULTS:  Lab Results   Component Value Date     04/28/2023     08/10/2018    POTASSIUM 3.8 04/28/2023    POTASSIUM 3.9 08/10/2018    CHLORIDE 112 (H) 04/28/2023    CHLORIDE 101 08/10/2018    CO2 16 (L) 04/28/2023    CO2 25 08/10/2018    ANIONGAP 13 04/28/2023    ANIONGAP 10 08/10/2018    GLC 97 04/28/2023    GLC 83 04/25/2023    GLC 88 08/10/2018    BUN 21.6 04/28/2023    BUN 15 08/10/2018    CR 2.81 (H) 04/28/2023    CR 0.84 11/14/2019    GFRESTIMATED 25 (L) 04/28/2023    GFRESTIMATED >90 11/14/2019    GFRESTBLACK >90 11/14/2019    MARY 9.2 04/28/2023    MARY 8.9 08/10/2018        A1C RESULTS:  Lab Results   Component Value Date    A1C 5.2 11/14/2019       INR RESULTS:  Lab Results   Component Value Date    INR 1.09 04/27/2023    INR 1.06 04/26/2023    INR 0.94 11/14/2019    INR 0.85 (L) 11/16/2018           CC  Santosh Ortega MD  [address]  [place] [address]

## 2024-12-19 ENCOUNTER — HOSPITAL ENCOUNTER (OUTPATIENT)
Dept: CARDIOLOGY | Facility: CLINIC | Age: 59
Discharge: HOME | End: 2024-12-19
Attending: INTERNAL MEDICINE
Payer: COMMERCIAL

## 2024-12-19 DIAGNOSIS — I35.0 NONRHEUMATIC AORTIC VALVE STENOSIS: ICD-10-CM

## 2024-12-19 LAB — LVEF ECHO: NORMAL

## 2024-12-19 PROCEDURE — 93306 TTE W/DOPPLER COMPLETE: CPT

## 2024-12-30 ENCOUNTER — TELEPHONE (OUTPATIENT)
Dept: CARDIOLOGY | Facility: CLINIC | Age: 59
End: 2024-12-30

## 2024-12-30 ENCOUNTER — HOSPITAL ENCOUNTER (OUTPATIENT)
Dept: CARDIOLOGY | Facility: CLINIC | Age: 59
Discharge: HOME | End: 2024-12-30
Attending: PHYSICIAN ASSISTANT | Admitting: PHYSICIAN ASSISTANT
Payer: COMMERCIAL

## 2024-12-30 DIAGNOSIS — R07.9 CHEST PAIN: ICD-10-CM

## 2024-12-30 DIAGNOSIS — I73.9 PAD (PERIPHERAL ARTERY DISEASE) (H): ICD-10-CM

## 2024-12-30 DIAGNOSIS — I10 HTN (HYPERTENSION): ICD-10-CM

## 2024-12-30 DIAGNOSIS — I35.0 AORTIC VALVE STENOSIS: ICD-10-CM

## 2024-12-30 PROCEDURE — 75571 CT HRT W/O DYE W/CA TEST: CPT

## 2024-12-30 PROCEDURE — 75571 CT HRT W/O DYE W/CA TEST: CPT | Mod: 26 | Performed by: INTERNAL MEDICINE

## 2025-03-13 NOTE — IP AVS SNAPSHOT
[facility]    [address] [place] [id number]    Phone:  [phone]                                       After Visit Summary   11/16/2018    Grey Candelario    MRN: [id number]           After Visit Summary Signature Page     I have received my discharge instructions, and my questions have been answered. I have discussed any challenges I see with this plan with the nurse or doctor.    ..........................................................................................................................................  Patient/Patient Representative Signature      ..........................................................................................................................................  Patient Representative Print Name and Relationship to Patient    ..................................................               ................................................  Date                                   Time    ..........................................................................................................................................  Reviewed by Signature/Title    ...................................................              ..............................................  Date                                               Time          22EPIC Rev 08/18         Post-Op Assessment Note    CV Status:  Stable  Pain Score: 0    Pain management: adequate       Mental Status:  Alert and awake   Hydration Status:  Euvolemic   PONV Controlled:  Controlled   Airway Patency:  Patent     Post Op Vitals Reviewed: Yes    No anethesia notable event occurred.    Staff: CRNA           Last Filed PACU Vitals:  Vitals Value Taken Time   Temp     Pulse 77    /60    Resp 15    SpO2 96

## 2025-03-31 ENCOUNTER — LAB (OUTPATIENT)
Dept: LAB | Facility: CLINIC | Age: 60
End: 2025-03-31
Payer: MEDICARE

## 2025-03-31 ENCOUNTER — OFFICE VISIT (OUTPATIENT)
Dept: CARDIOLOGY | Facility: CLINIC | Age: 60
End: 2025-03-31
Attending: INTERNAL MEDICINE
Payer: MEDICARE

## 2025-03-31 VITALS
SYSTOLIC BLOOD PRESSURE: 96 MMHG | BODY MASS INDEX: 31.65 KG/M2 | WEIGHT: 213.7 LBS | HEIGHT: 69 IN | DIASTOLIC BLOOD PRESSURE: 58 MMHG | HEART RATE: 78 BPM | OXYGEN SATURATION: 99 %

## 2025-03-31 DIAGNOSIS — I35.0 SEVERE AORTIC VALVE STENOSIS: ICD-10-CM

## 2025-03-31 DIAGNOSIS — I25.10 CORONARY ARTERY CALCIFICATION: ICD-10-CM

## 2025-03-31 DIAGNOSIS — R07.9 CHEST PAIN, UNSPECIFIED TYPE: ICD-10-CM

## 2025-03-31 DIAGNOSIS — I73.9 PAD (PERIPHERAL ARTERY DISEASE): Primary | ICD-10-CM

## 2025-03-31 DIAGNOSIS — I70.0 ATHEROSCLEROSIS OF AORTA: ICD-10-CM

## 2025-03-31 DIAGNOSIS — I35.0 NONRHEUMATIC AORTIC VALVE STENOSIS: ICD-10-CM

## 2025-03-31 LAB
ALBUMIN SERPL BCG-MCNC: 4.5 G/DL (ref 3.5–5.2)
ALP SERPL-CCNC: 56 U/L (ref 40–150)
ALT SERPL W P-5'-P-CCNC: 25 U/L (ref 0–70)
ANION GAP SERPL CALCULATED.3IONS-SCNC: 13 MMOL/L (ref 7–15)
AST SERPL W P-5'-P-CCNC: 43 U/L (ref 0–45)
BILIRUB SERPL-MCNC: 0.3 MG/DL
BUN SERPL-MCNC: 25.1 MG/DL (ref 8–23)
CALCIUM SERPL-MCNC: 10.9 MG/DL (ref 8.8–10.4)
CHLORIDE SERPL-SCNC: 104 MMOL/L (ref 98–107)
CREAT SERPL-MCNC: 1.77 MG/DL (ref 0.67–1.17)
EGFRCR SERPLBLD CKD-EPI 2021: 43 ML/MIN/1.73M2
ERYTHROCYTE [DISTWIDTH] IN BLOOD BY AUTOMATED COUNT: 12.4 % (ref 10–15)
GLUCOSE SERPL-MCNC: 86 MG/DL (ref 70–99)
HCO3 SERPL-SCNC: 23 MMOL/L (ref 22–29)
HCT VFR BLD AUTO: 34.6 % (ref 40–53)
HGB BLD-MCNC: 11.8 G/DL (ref 13.3–17.7)
MCH RBC QN AUTO: 34.9 PG (ref 26.5–33)
MCHC RBC AUTO-ENTMCNC: 34.1 G/DL (ref 31.5–36.5)
MCV RBC AUTO: 102 FL (ref 78–100)
PLATELET # BLD AUTO: 193 10E3/UL (ref 150–450)
POTASSIUM SERPL-SCNC: 4.5 MMOL/L (ref 3.4–5.3)
PROT SERPL-MCNC: 7.8 G/DL (ref 6.4–8.3)
RBC # BLD AUTO: 3.38 10E6/UL (ref 4.4–5.9)
SODIUM SERPL-SCNC: 140 MMOL/L (ref 135–145)
WBC # BLD AUTO: 5.7 10E3/UL (ref 4–11)

## 2025-03-31 PROCEDURE — 36415 COLL VENOUS BLD VENIPUNCTURE: CPT | Performed by: NURSE PRACTITIONER

## 2025-03-31 PROCEDURE — 80053 COMPREHEN METABOLIC PANEL: CPT | Performed by: NURSE PRACTITIONER

## 2025-03-31 PROCEDURE — 99215 OFFICE O/P EST HI 40 MIN: CPT | Performed by: NURSE PRACTITIONER

## 2025-03-31 PROCEDURE — 3074F SYST BP LT 130 MM HG: CPT | Performed by: NURSE PRACTITIONER

## 2025-03-31 PROCEDURE — G2211 COMPLEX E/M VISIT ADD ON: HCPCS | Performed by: NURSE PRACTITIONER

## 2025-03-31 PROCEDURE — 93000 ELECTROCARDIOGRAM COMPLETE: CPT | Performed by: NURSE PRACTITIONER

## 2025-03-31 PROCEDURE — 3078F DIAST BP <80 MM HG: CPT | Performed by: NURSE PRACTITIONER

## 2025-03-31 PROCEDURE — 85027 COMPLETE CBC AUTOMATED: CPT | Performed by: NURSE PRACTITIONER

## 2025-03-31 RX ORDER — SODIUM CHLORIDE 9 MG/ML
INJECTION, SOLUTION INTRAVENOUS CONTINUOUS
OUTPATIENT
Start: 2025-03-31

## 2025-03-31 RX ORDER — ASPIRIN 81 MG/1
81 TABLET, CHEWABLE ORAL DAILY
COMMUNITY

## 2025-03-31 RX ORDER — LIDOCAINE 40 MG/G
CREAM TOPICAL
OUTPATIENT
Start: 2025-03-31

## 2025-03-31 RX ORDER — ASPIRIN 325 MG
325 TABLET ORAL ONCE
OUTPATIENT
Start: 2025-03-31 | End: 2025-03-31

## 2025-03-31 RX ORDER — ASPIRIN 81 MG/1
243 TABLET, CHEWABLE ORAL ONCE
OUTPATIENT
Start: 2025-03-31

## 2025-03-31 NOTE — LETTER
3/31/2025    Santosh Ortega MD  [address]  [place] [address]    RE: Grey Candelario       Dear Colleague,     I had the pleasure of seeing Grey Candelario in the [facility].        Cardiology Clinic Follow up     Grey Candelario MRN# [id number]   YOB: 1965 Age: 59 year old     Primary cardiologist: Dr. Church    Reason for visit: H&P for coronary angiogram     History of presenting illness:    Grey Candelario is a pleasant 59 year old male with past medical history significant for former smoker, hypolipidemia, HTN, severe CAC, severe aortic stenosis (likely bicuspid), severe PAD with multiple prior bilateral iliac stents and right iliofemoral bypass and left femoral endarterectomy with patch angioplasty in 2022 (previous followed at [facility]), CKD with prior ELVA and BERNARD last in 2023 followed by Nephrology, prior spine surgery with lumbar fusion and repeat spine surgery with subsequent neuropathy who presents today for H&P for coronary angiogram and discuss his risk assessment.    Seen last in cardiology office 12/2024 with Dr. Church. Metoprolol was started. Recommendation for angiogram. Patient wasn't ready to schedul at the time.     Today, Grey presents for follow up with his wife who provides details. He has chronic bilateral hip pain and right calf pain with walking that also awakens him at night with chronic right foot numbness. Can only walk a block slowly and limited by leg pains. He gets chest discomfort at times about once a week which can happen with exertion or at rest and can wrap around at times to his back. This typically only lasts briefly as of late, has been better since starting on metoprolol. Gets winded or fatigued easily. Can't walk far with his PAD and weaker legs. No dizziness, presyncope or syncope, no orthopnea/PND.   Recently has had diarrhea (worse when on jardiance now off medication per nephrology), is on antibiotics for past 4 days  currently for stomach pain (history of mild diverticulitis which they suspect is culprit now). He and his wife express many frustrations and understandable concerns related to his complex medical history.     No family history of premature heart disease or bicuspid valves.           Assessment and Plan:     ASSESSMENT:    Severe CAC, chest pain  -Chest pain concerning for anginal equivalent. Symptoms have improved with addition of metoprolol.   -Dr. Church recommended coronary angiogram given CT results, chest pain and in part of pre-surgical workup with severe aortic stenosis. Complicated history with his severe PAD and history of contrast induced nephropathy.  -All risks and benefits of coronary angiogram with possible percutaneous intervention have been explained.  This includes but is not limited to bleeding, or potential for transfusion or risk of, clotting, heart attack, stroke, allergic reaction to x-ray dye or contrast nephropathy, or arrhythmia necessitating cardioversion.  We discussed potential outcomes with intracoronary stenting or multivessel disease warranting surgical input.  No history of sedation reaction, bleeding problems or current bleeding, and no scheduled surgeries or known procedures in the next year. Patient understands and is agreeable to proceed.  -I recommend updated labs today: CBC, BMP to ensure creatinine stable prior to proceeding. He will update his Nephrologist with result and time of angiogram once scheduled for any further input or recommendations prior.   -Schedule arterial US of aorta/iliacs/LE's to give more assessment prior to cath. No CT at this time given trying to limit contrast  -Recommend coronary angiogram with radial access given concerns of lower extremity access. If intervention needed may need to consider staged procedure pending dye load.   -Hold losartan-hydrochlorothiazide day of angiogram. Will pre-treat with IV fluids to help limit risk of ELVA. Can plan to recheck  BMP in follow up to ensure creatinine stable.   -Continue aspirin, rosuvastatin 40mg, metoprolol 50mg   -ER precautions reviewed     Severe aortic valve stenosis, likely bicuspid  -Last Echo with preserved LVEF, Aortic valve are is 0.74 cm^2. The peak AoV pressure gradient is 53.0 mmHg.  -As above coronary angiogram planned. Message to structural team for pre-planning. If 3vessel CAD then plan for CV surgery consultation first pending cath findings.     Severe PAD with multiple prior stents of bilateral iliacs in 2018 and 2019 and right ileofemoral bypass and left femoral endarterectomy with bovine patch angioplasty in 2022 with lifestyle limiting claudication  -Previously followed at [facility]. Last CT angio with runoffs in 2022 . Chronic claudication bilateral hips, right calf limited to 1 block walking.  -Update aortia/iliac/LE US prior to angiogram for better assessment of access sites in case of complication with radial access  -Continue aspirin, rosuvastatin   -Declines new vascular referral at this time, last seen with [facility] in 2022    History of contrast induced nephropathy (2022 after CT angio) with CKD   -Most recent creatinine ~1.48-1.54 (11/2024)  -Update BMP  -No longer on Jardiance  -Follows with [facility]     HTN  -Currently well controlled to low normal. No lightheadedness or presyncope  -Continues on metoprolol 50mg, losartan-hydrochlorothiazide     Diverticulitis  -Currently on antibiotic regimen, plans to complete course prior to angiogram        PLAN:     Continue current medications  Labs today (not fasting so will hold off on lipid check today but would like to get in future)  Aorta/iliac/LE US  Schedule coronary angiogram with ideally radial approach, limiting contrast as able and will treat pre and post with IV fluids   Hold losartan-hydrochlorothiazide day of angiogram to help limit kidney risks   Message to structural team with update pending cath findings   Follow up in 3-4 weeks after  "angiogram        Elizabeth Dela, DNP, APRN, CNP  [facility]     Orders this Visit:  Orders Placed This Encounter   Procedures     US Aorta/Ivc/Iliac Duplex Complete     US Lower Extremity Arterial Duplex Bilateral     CBC with platelets     Comprehensive metabolic panel     Follow-Up with Cardiology     Follow-Up with Cardiology- DARION     EKG 12-lead complete w/read - Clinics (performed today)     Case Request Cath Lab: Coronary Angiogram, Percutaneous Coronary Intervention     Orders Placed This Encounter   Medications     amoxicillin-clavulanate (AUGMENTIN) 875-125 MG tablet     Sig: Take 1 tablet by mouth 2 times daily.     aspirin (ASA) 81 MG chewable tablet     Sig: Take 81 mg by mouth daily.     Medications Discontinued During This Encounter   Medication Reason     empagliflozin (JARDIANCE) 10 MG TABS tablet        Today's clinic visit entailed:  65 minutes spent by me on the date of the encounter doing chart review, review of outside records, review of test results, interpretation of tests, patient visit, documentation, discussion with other provider(s), and discussion with family. ECG reviewed.   The level of medical decision making during this visit was of high complexity.           Physical Exam:   Vitals: BP 96/58 (BP Location: Left arm, Patient Position: Sitting, Cuff Size: Adult Large)   Pulse 78   Ht 1.753 m (5' 9\")   Wt 96.9 kg (213 lb 11.2 oz)   SpO2 99%   BMI 31.56 kg/m      Body mass index is 31.56 kg/m .  Wt Readings from Last 3 Encounters:   03/31/25 96.9 kg (213 lb 11.2 oz)   11/19/24 95.7 kg (210 lb 14.4 oz)   05/11/23 87 kg (191 lb 12.8 oz)        General :   Alert and oriented, in no acute distress.    Respiratory:   Respirations unlabored. Clear bilaterally with no rales, rhonchi, or wheezes.     Cardiovascular:   Rhythm is regular. S1 and S2 are normal.+systolic murmur with radiation to carotids   Extremities: Warm and dry, no lower edema. Bilateral radial " pulses 2+ equal    Neurologic: Moves all extremities, non focal    Psych:  Appropriate             Medications:     Current Outpatient Medications   Medication Sig Dispense Refill     amoxicillin-clavulanate (AUGMENTIN) 875-125 MG tablet Take 1 tablet by mouth 2 times daily.       aspirin (ASA) 81 MG chewable tablet Take 81 mg by mouth daily.       losartan-hydrochlorothiazide (HYZAAR) 50-12.5 MG tablet 1 tablet daily.       metoprolol succinate ER (TOPROL XL) 50 MG 24 hr tablet Take 1 tablet (50 mg) by mouth daily. 90 tablet 0     rosuvastatin (CRESTOR) 40 MG tablet TAKE 1 TABLET BY MOUTH ONE TIME DAILY 90 tablet 3     vitamin D3 (CHOLECALCIFEROL) 50 mcg (2000 units) tablet        omeprazole (PRILOSEC) 20 MG DR capsule  (Patient not taking: Reported on 3/31/2025)         No family history on file.    Social History     Socioeconomic History     Marital status: Single     Spouse name: Not on file     Number of children: Not on file     Years of education: Not on file     Highest education level: Not on file   Occupational History     Not on file   Tobacco Use     Smoking status: Every Day     Current packs/day: 1.00     Types: Cigarettes     Smokeless tobacco: Never   Substance and Sexual Activity     Alcohol use: Yes     Drug use: No     Sexual activity: Not on file   Other Topics Concern     Parent/sibling w/ CABG, MI or angioplasty before 65F 55M? Not Asked   Social History Narrative     Not on file     Social Drivers of Health     Financial Resource Strain: Low Risk  (10/5/2022)    Received from [facility]    Overall Financial Resource Strain (CARDIA)   Food Insecurity: No Food Insecurity (10/5/2022)    Received from [facility]    Hunger Vital Sign   Transportation Needs: No Transportation Needs (10/5/2022)    Received from [facility]    PRAPARE - Transportation      Lack of Transportation (Medical): No      Lack of Transportation (Non-Medical): No      : Not on file      : Not on file      : Not on file       : Not on file   Physical Activity: Inactive (10/5/2022)    Received from [facility]    Exercise Vital Sign      Days of Exercise per Week: 0 days      Minutes of Exercise per Session: 0 min      : Not on file      : Not on file      : Not on file      : Not on file   Stress: Stress Concern Present (10/5/2022)    Received from [facility]    [employer] [employer] - Occupational Stress Questionnaire   Social Connections: Moderately Isolated (10/5/2022)    Received from [facility]    Social Connection and Isolation Panel [NHANES]   Interpersonal Safety: Not At Risk (10/5/2022)    Received from [facility]    Humiliation, Afraid, Rape, and Kick questionnaire      Fear of Current or Ex-Partner: No      Emotionally Abused: No      Physically Abused: No      Sexually Abused: No   Housing Stability: High Risk (10/5/2022)    Received from [facility]    Housing Stability Vital Sign          Past Medical History:     Past Medical History:   Diagnosis Date     Current smoker      HTN (hypertension)      Hx of heart artery stent     2018 2 stents placed     Hyperlipidemia             Past Surgical History:     Past Surgical History:   Procedure Laterality Date     IR LOWER EXTREMITY ANGIOGRAM BILATERAL  11/14/2019     IR RENAL BIOPSY RIGHT  5/10/2023     OTHER SURGICAL HISTORY      hip surgery , hardware in place.            Allergies:   Jardiance [empagliflozin] and Morphine hcl       Data Reviewed today:   Most Recent Echocardiogram: 12/19/2025   Summary     The visual ejection fraction is 65-70%.  The right ventricle is normal in size and function. Mild RVH.  The aortic Sinus(es) of Valsalva are mildly dilated at 39 mm.  The aortic valve is not well visualized. Likely bicuspid valve. Severe  valvular aortic stenosis. Peak velocity is over 4 m/sec.     Compared to the prior study, AS has worsened.    Echo: 4/2023  Summary     Left ventricular systolic function is normal.  The visual ejection fraction is  60-65%.  No regional wall motion abnormalities noted.  AV not werll visualzied, bicuspir aortic valve not excluded.  Mild to moderate valvular aortic stenosis.  The mean AoV pressure gradient is 19mmHg.  The study was technically adequate. There is no comparison study available.    Echo Dobi Stress: 2/2022 ([place])  Final Impressions   1. STRESS IMPRESSIONS:   2. Dobutamine stress echocardiogram negative for myocardial ischemia.   3. Ejection fraction response from 65% at rest to 75% at peak stress; end-systolic volume decreased with stress.   4. No regional wall motion abnormalities with stress.     5. REST IMPRESSIONS:   6. Severe  aortic valve stenosis; possible bicuspid valve; valve area 1.0 cm2 (index area 0.5 cm2), mean gradient 36 mmHg, dimensionless index 0.25   7. No aortic valve regurgitation.   8. Normal left ventricular chamber size.   9. Normal right ventricular chamber size; normal systolic function   10. Calcified mitral annulus, sclerotic leaflets; mean diastolic gradient 3 mmHg (HR 75)   11. No  pericardial effusion.     CT Coronary calcium scan: 12/30/2024  FINDINGS:  Overall quality of the study: Good.      CORONARY ARTERY CALCIUM SCORES:      Left main coronary artery: 116  Left anterior descending coronary artery: 708  Circumflex coronary artery: 65.1  Right coronary artery: 784     TOTAL CALCIUM SCORE: 1673     The total Agatston calcium score is 1673. This score places the  patient in the 98th percentile when compared to an age and gender  matched control group.     Calcification of the aortic valve and ascending aorta also noted.      CT Abdomen Pelvis angio and LE extremity with runoff: 8/2022  Right Lower Extremity:   1.  Interval postsurgical changes of right common femoral artery interposition graft since   01/06/2022. Graft is patent without focal stenosis.   2.  Patent stent extending from the lower abdominal aorta to the external iliac artery. Moderate   stenosis external iliac artery  distal to the stent.   3.  Remainder of vascular disease is unchanged.     Left Lower Extremity:   1.  Interval postsurgical changes of left common femoral endarterectomy and patch angioplasty since   01/06/1992. Patent without focal stenosis.   2.  Resolved focal stenosis at the origin of the superficial femoral artery.   3.  Patent stent extending from the lower abdomen aorta to the external iliac artery.   4.  Remainder of vascular disease is unchanged.      IR LE angiogram bilateral : 11/2019  IMPRESSION:  1. Pelvic and bilateral lower extremity angiogram showing severe  stenosis of the right iliac artery bifurcation, moderate stenosis of  the distal left common iliac artery, bilateral common femoral artery  calcifications resulting in moderate stenosis, bilateral adductor  canal stenosis and right popliteal artery stenosis. There is  three-vessel in-line flow to the foot bilaterally.  2. 7 mm Bare-metal self-expanding stent placement within the right  common and external iliac artery with overlap with existing right  common and external iliac artery stent. Post stent deployment imaging  shows widely patent common and external iliac artery stents with  continued blood flow via the internal iliac artery.  3. 8 mm Bare-metal self-expanding stent placement within the right  common and external iliac artery with overlap with existing right  common and external iliac artery stent. Post stent deployment imaging  shows widely patent common and external iliac artery stents with  continued blood flow via the internal iliac artery.  4. Conventional angioplasty right superficial femoral artery and  popliteal artery with improved angiographic results.  5. Conventional angioplasty left superficial femoral artery with  improved angiographic results.       All laboratory data reviewed:    Recent Labs   Lab Test 04/27/23  1619 04/26/23  1507 04/20/23  1339 10/04/22  1622 11/14/19  1305 11/16/18  1120 03/23/18  0643   LDL  --    --   --   --  90 144* 82   HDL  --   --   --   --  79 96 80   NHDL  --   --   --   --  105 159* 95   CHOL  --   --   --   --  184 255* 175   TRIG  --   --  159* 98 77 76 63   IRON  --  64  --   --   --   --   --    FEB  --  203*  --   --   --   --   --    IRONSAT  --  32  --   --   --   --   --    FARIDEH 290  --   --   --   --   --   --        Lab Results   Component Value Date    WBC 6.0 04/28/2023    WBC 8.7 11/14/2019    RBC 2.64 (L) 04/28/2023    RBC 4.14 (L) 11/14/2019    HGB 8.4 (L) 04/28/2023    HGB 14.0 11/14/2019    HCT 25.2 (L) 04/28/2023    HCT 40.2 11/14/2019    MCV 96 04/28/2023    MCV 97 11/14/2019    MCH 31.8 04/28/2023    MCH 33.8 (H) 11/14/2019    MCHC 33.3 04/28/2023    MCHC 34.8 11/14/2019    RDW 15.9 (H) 04/28/2023    RDW 12.4 11/14/2019     (L) 04/28/2023     11/14/2019       Lab Results   Component Value Date     04/28/2023     08/10/2018    POTASSIUM 3.8 04/28/2023    POTASSIUM 3.9 08/10/2018    CHLORIDE 103 10/29/2024    CHLORIDE 101 08/10/2018    CO2 16 (L) 04/28/2023    CO2 25 08/10/2018    ANIONGAP 13 04/28/2023    ANIONGAP 10 08/10/2018    GLC 97 04/28/2023    GLC 83 04/25/2023    GLC 88 08/10/2018    BUN 21.6 04/28/2023    BUN 15 08/10/2018    CR 2.81 (H) 04/28/2023    CR 0.84 11/14/2019    GFRESTIMATED 25 (L) 04/28/2023    GFRESTIMATED >90 11/14/2019    GFRESTBLACK >90 11/14/2019    MARY 9.2 04/28/2023    MARY 8.9 08/10/2018      Lab Results   Component Value Date    AST 26 04/27/2023    AST 22 11/10/2017    ALT 17 04/27/2023    ALT 11 11/10/2017       Lab Results   Component Value Date    A1C 5.2 11/14/2019       The longitudinal plan of care for Grey was addressed during this visit. Due to the added complexity in care, I will continue to support Grey in the subsequent management of this condition(s) and with the ongoing continuity of care of this condition(s).    This note has been dictated using voice recognition software. Any grammatical, typographical, or  context distortions are unintentional and inherent to the software.    Thank you for allowing me to participate in the care of your patient.      Sincerely,     Elizabeth Deal, [facility]  cc:   Andrea Church MD  [address] [place] [phone]

## 2025-03-31 NOTE — PROGRESS NOTES
Cardiology Clinic Follow up     Grey Candelario MRN# [id number]   YOB: 1965 Age: 59 year old     Primary cardiologist: Dr. Church    Reason for visit: H&P for coronary angiogram     History of presenting illness:    Grey Candelario is a pleasant 59 year old male with past medical history significant for former smoker, hypolipidemia, HTN, severe CAC, severe aortic stenosis (likely bicuspid), severe PAD with multiple prior bilateral iliac stents and right iliofemoral bypass and left femoral endarterectomy with patch angioplasty in 2022 (previous followed at [facility]), CKD with prior ELVA and BERNARD last in 2023 followed by Nephrology, prior spine surgery with lumbar fusion and repeat spine surgery with subsequent neuropathy who presents today for H&P for coronary angiogram and discuss his risk assessment.    Seen last in cardiology office 12/2024 with Dr. Church. Metoprolol was started. Recommendation for angiogram. Patient wasn't ready to schedul at the time.     Today, Grey presents for follow up with his wife who provides details. He has chronic bilateral hip pain and right calf pain with walking that also awakens him at night with chronic right foot numbness. Can only walk a block slowly and limited by leg pains. He gets chest discomfort at times about once a week which can happen with exertion or at rest and can wrap around at times to his back. This typically only lasts briefly as of late, has been better since starting on metoprolol. Gets winded or fatigued easily. Can't walk far with his PAD and weaker legs. No dizziness, presyncope or syncope, no orthopnea/PND.   Recently has had diarrhea (worse when on jardiance now off medication per nephrology), is on antibiotics for past 4 days currently for stomach pain (history of mild diverticulitis which they suspect is culprit now). He and his wife express many frustrations and understandable concerns related to his complex medical history.     No family  history of premature heart disease or bicuspid valves.           Assessment and Plan:     ASSESSMENT:    Severe CAC, chest pain  -Chest pain concerning for anginal equivalent. Symptoms have improved with addition of metoprolol.   -Dr. Church recommended coronary angiogram given CT results, chest pain and in part of pre-surgical workup with severe aortic stenosis. Complicated history with his severe PAD and history of contrast induced nephropathy.  -All risks and benefits of coronary angiogram with possible percutaneous intervention have been explained.  This includes but is not limited to bleeding, or potential for transfusion or risk of, clotting, heart attack, stroke, allergic reaction to x-ray dye or contrast nephropathy, or arrhythmia necessitating cardioversion.  We discussed potential outcomes with intracoronary stenting or multivessel disease warranting surgical input.  No history of sedation reaction, bleeding problems or current bleeding, and no scheduled surgeries or known procedures in the next year. Patient understands and is agreeable to proceed.  -I recommend updated labs today: CBC, BMP to ensure creatinine stable prior to proceeding. He will update his Nephrologist with result and time of angiogram once scheduled for any further input or recommendations prior.   -Schedule arterial US of aorta/iliacs/LE's to give more assessment prior to cath. No CT at this time given trying to limit contrast  -Recommend coronary angiogram with radial access given concerns of lower extremity access. If intervention needed may need to consider staged procedure pending dye load.   -Hold losartan-hydrochlorothiazide day of angiogram. Will pre-treat with IV fluids to help limit risk of ELVA. Can plan to recheck BMP in follow up to ensure creatinine stable.   -Continue aspirin, rosuvastatin 40mg, metoprolol 50mg   -ER precautions reviewed     Severe aortic valve stenosis, likely bicuspid  -Last Echo with preserved LVEF, Aortic  valve are is 0.74 cm^2. The peak AoV pressure gradient is 53.0 mmHg.  -As above coronary angiogram planned. Message to structural team for pre-planning. If 3vessel CAD then plan for CV surgery consultation first pending cath findings.     Severe PAD with multiple prior stents of bilateral iliacs in 2018 and 2019 and right ileofemoral bypass and left femoral endarterectomy with bovine patch angioplasty in 2022 with lifestyle limiting claudication  -Previously followed at [facility]. Last CT angio with runoffs in 2022 . Chronic claudication bilateral hips, right calf limited to 1 block walking.  -Update aortia/iliac/LE US prior to angiogram for better assessment of access sites in case of complication with radial access  -Continue aspirin, rosuvastatin   -Declines new vascular referral at this time, last seen with [facility] in 2022    History of contrast induced nephropathy (2022 after CT angio) with CKD   -Most recent creatinine ~1.48-1.54 (11/2024)  -Update BMP  -No longer on Jardiance  -Follows with [facility]     HTN  -Currently well controlled to low normal. No lightheadedness or presyncope  -Continues on metoprolol 50mg, losartan-hydrochlorothiazide     Diverticulitis  -Currently on antibiotic regimen, plans to complete course prior to angiogram        PLAN:     Continue current medications  Labs today (not fasting so will hold off on lipid check today but would like to get in future)  Aorta/iliac/LE US  Schedule coronary angiogram with ideally radial approach, limiting contrast as able and will treat pre and post with IV fluids   Hold losartan-hydrochlorothiazide day of angiogram to help limit kidney risks   Message to structural team with update pending cath findings   Follow up in 3-4 weeks after angiogram        Elizabeth Deal, DNP, APRN, CNP  [facility] [place]     Orders this Visit:  Orders Placed This Encounter   Procedures    US Aorta/Ivc/Iliac Duplex Complete    US Lower Extremity  "Arterial Duplex Bilateral    CBC with platelets    Comprehensive metabolic panel    Follow-Up with Cardiology    Follow-Up with Cardiology- DARION    EKG 12-lead complete w/read - Clinics (performed today)    Case Request Cath Lab: Coronary Angiogram, Percutaneous Coronary Intervention     Orders Placed This Encounter   Medications    amoxicillin-clavulanate (AUGMENTIN) 875-125 MG tablet     Sig: Take 1 tablet by mouth 2 times daily.    aspirin (ASA) 81 MG chewable tablet     Sig: Take 81 mg by mouth daily.     Medications Discontinued During This Encounter   Medication Reason    empagliflozin (JARDIANCE) 10 MG TABS tablet        Today's clinic visit entailed:  65 minutes spent by me on the date of the encounter doing chart review, review of outside records, review of test results, interpretation of tests, patient visit, documentation, discussion with other provider(s), and discussion with family. ECG reviewed.   The level of medical decision making during this visit was of high complexity.           Physical Exam:   Vitals: BP 96/58 (BP Location: Left arm, Patient Position: Sitting, Cuff Size: Adult Large)   Pulse 78   Ht 1.753 m (5' 9\")   Wt 96.9 kg (213 lb 11.2 oz)   SpO2 99%   BMI 31.56 kg/m      Body mass index is 31.56 kg/m .  Wt Readings from Last 3 Encounters:   03/31/25 96.9 kg (213 lb 11.2 oz)   11/19/24 95.7 kg (210 lb 14.4 oz)   05/11/23 87 kg (191 lb 12.8 oz)        General :   Alert and oriented, in no acute distress.    Respiratory:   Respirations unlabored. Clear bilaterally with no rales, rhonchi, or wheezes.     Cardiovascular:   Rhythm is regular. S1 and S2 are normal.+systolic murmur with radiation to carotids   Extremities: Warm and dry, no lower edema. Bilateral radial pulses 2+ equal    Neurologic: Moves all extremities, non focal    Psych:  Appropriate             Medications:     Current Outpatient Medications   Medication Sig Dispense Refill    amoxicillin-clavulanate (AUGMENTIN) 875-125 " MG tablet Take 1 tablet by mouth 2 times daily.      aspirin (ASA) 81 MG chewable tablet Take 81 mg by mouth daily.      losartan-hydrochlorothiazide (HYZAAR) 50-12.5 MG tablet 1 tablet daily.      metoprolol succinate ER (TOPROL XL) 50 MG 24 hr tablet Take 1 tablet (50 mg) by mouth daily. 90 tablet 0    rosuvastatin (CRESTOR) 40 MG tablet TAKE 1 TABLET BY MOUTH ONE TIME DAILY 90 tablet 3    vitamin D3 (CHOLECALCIFEROL) 50 mcg (2000 units) tablet       omeprazole (PRILOSEC) 20 MG DR capsule  (Patient not taking: Reported on 3/31/2025)         No family history on file.    Social History     Socioeconomic History    Marital status: Single     Spouse name: Not on file    Number of children: Not on file    Years of education: Not on file    Highest education level: Not on file   Occupational History    Not on file   Tobacco Use    Smoking status: Every Day     Current packs/day: 1.00     Types: Cigarettes    Smokeless tobacco: Never   Substance and Sexual Activity    Alcohol use: Yes    Drug use: No    Sexual activity: Not on file   Other Topics Concern    Parent/sibling w/ CABG, MI or angioplasty before 65F 55M? Not Asked   Social History Narrative    Not on file     Social Drivers of Health     Financial Resource Strain: Low Risk  (10/5/2022)    Received from [facility]    Overall Financial Resource Strain (CARDIA)   Food Insecurity: No Food Insecurity (10/5/2022)    Received from [facility]    Hunger Vital Sign   Transportation Needs: No Transportation Needs (10/5/2022)    Received from [facility]    PRAPARE - Transportation     Lack of Transportation (Medical): No     Lack of Transportation (Non-Medical): No     : Not on file     : Not on file     : Not on file     : Not on file   Physical Activity: Inactive (10/5/2022)    Received from [facility]    Exercise Vital Sign     Days of Exercise per Week: 0 days     Minutes of Exercise per Session: 0 min     : Not on file     : Not on file     : Not on file     :  Not on file   Stress: Stress Concern Present (10/5/2022)    Received from [facility]    [employer] - Occupational Stress Questionnaire   Social Connections: Moderately Isolated (10/5/2022)    Received from [facility]    Social Connection and Isolation Panel [NHANES]   Interpersonal Safety: Not At Risk (10/5/2022)    Received from [facility]    Humiliation, Afraid, Rape, and Kick questionnaire     Fear of Current or Ex-Partner: No     Emotionally Abused: No     Physically Abused: No     Sexually Abused: No   Housing Stability: High Risk (10/5/2022)    Received from [facility]    Housing Stability Vital Sign          Past Medical History:     Past Medical History:   Diagnosis Date    Current smoker     HTN (hypertension)     Hx of heart artery stent     2018 2 stents placed    Hyperlipidemia             Past Surgical History:     Past Surgical History:   Procedure Laterality Date    IR LOWER EXTREMITY ANGIOGRAM BILATERAL  11/14/2019    IR RENAL BIOPSY RIGHT  5/10/2023    OTHER SURGICAL HISTORY      hip surgery , hardware in place.            Allergies:   Jardiance [empagliflozin] and Morphine hcl       Data Reviewed today:   Most Recent Echocardiogram: 12/19/2025   Summary     The visual ejection fraction is 65-70%.  The right ventricle is normal in size and function. Mild RVH.  The aortic Sinus(es) of Valsalva are mildly dilated at 39 mm.  The aortic valve is not well visualized. Likely bicuspid valve. Severe  valvular aortic stenosis. Peak velocity is over 4 m/sec.     Compared to the prior study, AS has worsened.    Echo: 4/2023  Summary     Left ventricular systolic function is normal.  The visual ejection fraction is 60-65%.  No regional wall motion abnormalities noted.  AV not werll visualzied, bicuspir aortic valve not excluded.  Mild to moderate valvular aortic stenosis.  The mean AoV pressure gradient is 19mmHg.  The study was technically adequate. There is no comparison  study available.    Echo Dobi Stress: 2/2022 ([place])  Final Impressions   1. STRESS IMPRESSIONS:   2. Dobutamine stress echocardiogram negative for myocardial ischemia.   3. Ejection fraction response from 65% at rest to 75% at peak stress; end-systolic volume decreased with stress.   4. No regional wall motion abnormalities with stress.     5. REST IMPRESSIONS:   6. Severe  aortic valve stenosis; possible bicuspid valve; valve area 1.0 cm2 (index area 0.5 cm2), mean gradient 36 mmHg, dimensionless index 0.25   7. No aortic valve regurgitation.   8. Normal left ventricular chamber size.   9. Normal right ventricular chamber size; normal systolic function   10. Calcified mitral annulus, sclerotic leaflets; mean diastolic gradient 3 mmHg (HR 75)   11. No  pericardial effusion.     CT Coronary calcium scan: 12/30/2024  FINDINGS:  Overall quality of the study: Good.      CORONARY ARTERY CALCIUM SCORES:      Left main coronary artery: 116  Left anterior descending coronary artery: 708  Circumflex coronary artery: 65.1  Right coronary artery: 784     TOTAL CALCIUM SCORE: 1673     The total Agatston calcium score is 1673. This score places the  patient in the 98th percentile when compared to an age and gender  matched control group.     Calcification of the aortic valve and ascending aorta also noted.      CT Abdomen Pelvis angio and LE extremity with runoff: 8/2022  Right Lower Extremity:   1.  Interval postsurgical changes of right common femoral artery interposition graft since   01/06/2022. Graft is patent without focal stenosis.   2.  Patent stent extending from the lower abdominal aorta to the external iliac artery. Moderate   stenosis external iliac artery distal to the stent.   3.  Remainder of vascular disease is unchanged.     Left Lower Extremity:   1.  Interval postsurgical changes of left common femoral endarterectomy and patch angioplasty since   01/06/1992. Patent without focal stenosis.   2.  Resolved focal  stenosis at the origin of the superficial femoral artery.   3.  Patent stent extending from the lower abdomen aorta to the external iliac artery.   4.  Remainder of vascular disease is unchanged.      IR LE angiogram bilateral : 11/2019  IMPRESSION:  1. Pelvic and bilateral lower extremity angiogram showing severe  stenosis of the right iliac artery bifurcation, moderate stenosis of  the distal left common iliac artery, bilateral common femoral artery  calcifications resulting in moderate stenosis, bilateral adductor  canal stenosis and right popliteal artery stenosis. There is  three-vessel in-line flow to the foot bilaterally.  2. 7 mm Bare-metal self-expanding stent placement within the right  common and external iliac artery with overlap with existing right  common and external iliac artery stent. Post stent deployment imaging  shows widely patent common and external iliac artery stents with  continued blood flow via the internal iliac artery.  3. 8 mm Bare-metal self-expanding stent placement within the right  common and external iliac artery with overlap with existing right  common and external iliac artery stent. Post stent deployment imaging  shows widely patent common and external iliac artery stents with  continued blood flow via the internal iliac artery.  4. Conventional angioplasty right superficial femoral artery and  popliteal artery with improved angiographic results.  5. Conventional angioplasty left superficial femoral artery with  improved angiographic results.       All laboratory data reviewed:    Recent Labs   Lab Test 04/27/23  1619 04/26/23  1507 04/20/23  1339 10/04/22  1622 11/14/19  1305 11/16/18  1120 03/23/18  0643   LDL  --   --   --   --  90 144* 82   HDL  --   --   --   --  79 96 80   NHDL  --   --   --   --  105 159* 95   CHOL  --   --   --   --  184 255* 175   TRIG  --   --  159* 98 77 76 63   IRON  --  64  --   --   --   --   --    FEB  --  203*  --   --   --   --   --    IRONSAT   --  32  --   --   --   --   --    FARIDEH 290  --   --   --   --   --   --        Lab Results   Component Value Date    WBC 6.0 04/28/2023    WBC 8.7 11/14/2019    RBC 2.64 (L) 04/28/2023    RBC 4.14 (L) 11/14/2019    HGB 8.4 (L) 04/28/2023    HGB 14.0 11/14/2019    HCT 25.2 (L) 04/28/2023    HCT 40.2 11/14/2019    MCV 96 04/28/2023    MCV 97 11/14/2019    MCH 31.8 04/28/2023    MCH 33.8 (H) 11/14/2019    MCHC 33.3 04/28/2023    MCHC 34.8 11/14/2019    RDW 15.9 (H) 04/28/2023    RDW 12.4 11/14/2019     (L) 04/28/2023     11/14/2019       Lab Results   Component Value Date     04/28/2023     08/10/2018    POTASSIUM 3.8 04/28/2023    POTASSIUM 3.9 08/10/2018    CHLORIDE 103 10/29/2024    CHLORIDE 101 08/10/2018    CO2 16 (L) 04/28/2023    CO2 25 08/10/2018    ANIONGAP 13 04/28/2023    ANIONGAP 10 08/10/2018    GLC 97 04/28/2023    GLC 83 04/25/2023    GLC 88 08/10/2018    BUN 21.6 04/28/2023    BUN 15 08/10/2018    CR 2.81 (H) 04/28/2023    CR 0.84 11/14/2019    GFRESTIMATED 25 (L) 04/28/2023    GFRESTIMATED >90 11/14/2019    GFRESTBLACK >90 11/14/2019    MARY 9.2 04/28/2023    MARY 8.9 08/10/2018      Lab Results   Component Value Date    AST 26 04/27/2023    AST 22 11/10/2017    ALT 17 04/27/2023    ALT 11 11/10/2017       Lab Results   Component Value Date    A1C 5.2 11/14/2019       The longitudinal plan of care for Grey was addressed during this visit. Due to the added complexity in care, I will continue to support Grey in the subsequent management of this condition(s) and with the ongoing continuity of care of this condition(s).    This note has been dictated using voice recognition software. Any grammatical, typographical, or context distortions are unintentional and inherent to the software.

## 2025-03-31 NOTE — PATIENT INSTRUCTIONS
It was nice to meet you.    Thank you for your visit with the [facility] today.      Recommendations discussed today:     Labs today - ensure creatinine stable for angiogram    Schedule abdominal iliac ultrasound as soon as possible in next 1-2 weeks in case we need a groin access. We will work on getting [place] records scanned in     Schedule coronary angiogram following that with Dr. Auguste or Dr. Mercado - need internationalist that can do a radial access site.    Notify your Nephrologist once this is scheduled to ensure they are okay with your creatinine level and if any other recommendations prior to angiogram     The morning of the angiogram do NOT take losartan-hydrochlorothiazide      Labs:  - Today    Follow up plan:   - See Shawna CUMMINS back about 1 week after angiogram    - Schedule visit with our structural MD for consultation on the aortic valve. Often you will meet with CV surgery in tandem as well      If you have questions or concerns in the meantime please call my nurse team at [phone] or send a [employer] message for non urgent requests.       Scheduling phone number: [phone]    ________________________________    SHUBHAM Byrd, CNP    [facility]

## 2025-04-09 ENCOUNTER — HOSPITAL ENCOUNTER (OUTPATIENT)
Dept: ULTRASOUND IMAGING | Facility: CLINIC | Age: 60
Discharge: HOME | End: 2025-04-09
Attending: NURSE PRACTITIONER
Payer: MEDICARE

## 2025-04-09 DIAGNOSIS — I73.9 PAD (PERIPHERAL ARTERY DISEASE): ICD-10-CM

## 2025-04-09 DIAGNOSIS — I70.0 ATHEROSCLEROSIS OF AORTA: ICD-10-CM

## 2025-04-09 PROCEDURE — 93978 VASCULAR STUDY: CPT

## 2025-04-10 ENCOUNTER — TELEPHONE (OUTPATIENT)
Dept: CARDIOLOGY | Facility: CLINIC | Age: 60
End: 2025-04-10
Payer: MEDICARE

## 2025-04-10 NOTE — TELEPHONE ENCOUNTER
Grey has upcoming angiogram  Extra hydration noted on order.   [employer] called and patient will arrive 2 hours early    RN's to order/state extra hydration needed on orders    Thank you,   Zunilda MARTINEZ RN

## 2025-04-13 ENCOUNTER — HEALTH MAINTENANCE LETTER (OUTPATIENT)
Age: 60
End: 2025-04-13

## 2025-04-15 ENCOUNTER — TELEPHONE (OUTPATIENT)
Dept: CARDIOLOGY | Facility: CLINIC | Age: 60
End: 2025-04-15
Payer: MEDICARE

## 2025-04-15 DIAGNOSIS — I35.0 SEVERE AORTIC VALVE STENOSIS: ICD-10-CM

## 2025-04-15 DIAGNOSIS — R07.9 CHEST PAIN, UNSPECIFIED TYPE: ICD-10-CM

## 2025-04-15 DIAGNOSIS — I70.0 ATHEROSCLEROSIS OF AORTA: ICD-10-CM

## 2025-04-15 DIAGNOSIS — I25.10 CORONARY ARTERY CALCIFICATION: ICD-10-CM

## 2025-04-15 DIAGNOSIS — N17.9 AKI (ACUTE KIDNEY INJURY): ICD-10-CM

## 2025-04-15 DIAGNOSIS — I10 HTN (HYPERTENSION): ICD-10-CM

## 2025-04-15 DIAGNOSIS — I35.0 NONRHEUMATIC AORTIC VALVE STENOSIS: Primary | ICD-10-CM

## 2025-04-15 DIAGNOSIS — I73.9 PAD (PERIPHERAL ARTERY DISEASE): ICD-10-CM

## 2025-04-15 RX ORDER — LIDOCAINE 40 MG/G
CREAM TOPICAL
OUTPATIENT
Start: 2025-04-15

## 2025-04-15 RX ORDER — SODIUM CHLORIDE 9 MG/ML
INJECTION, SOLUTION INTRAVENOUS CONTINUOUS
OUTPATIENT
Start: 2025-04-15

## 2025-04-15 RX ORDER — ASPIRIN 81 MG/1
243 TABLET, CHEWABLE ORAL ONCE
OUTPATIENT
Start: 2025-04-15

## 2025-04-15 RX ORDER — POTASSIUM CHLORIDE 1500 MG/1
20 TABLET, EXTENDED RELEASE ORAL
OUTPATIENT
Start: 2025-04-15

## 2025-04-15 RX ORDER — ASPIRIN 325 MG
325 TABLET ORAL ONCE
OUTPATIENT
Start: 2025-04-15 | End: 2025-04-15

## 2025-04-15 NOTE — TELEPHONE ENCOUNTER
Coronary angiogram/PCI/Right Heart Cath prep instructions.     Patient is scheduled for a Coronary Angiogram at [facility] - [address], [place] [address] - Main Entrance of the Hospital on 4/18/25.  Check in time is at 6:30 am for extra hydration and procedure to follow.    Performing Cardiologist: Dr. Mercado    Patient instructed to remain NPO for solid foods 8 hours prior to arrival and may have clear liquids up to 2 hours prior to arrival.    Patient does require extra fluids prior to procedure and has been instructed to arrive at 6:30 am    Patient is not diabetic.    Patient is not on anticoagulation.    Patient is taking hydrochlorothiazide and has been instructed to hold it the morning of procedure.     Patient is currently taking 81 mg aspirin daily. Will take total of 324 mg the morning of procedure    Pt is not on a SGLT2 inhibitor.    Pt is not on a GLP-1 Agonist    Patient advised to take their other daily medications the morning of the procedure with small sips of water.     Patient advised to shower the night before and morning of their procedure with regular soap.    Verified patient does not have a contrast allergy.    Verified patient has someone available to drive them home from the hospital and can stay with them for 24 hours after the procedure.     Patient advised they will have bedrest post procedure.  Length of time is 2-6 hours and dependent on access site used for procedure.  This bedrest is to allow proper clotting of the access site to prevent bleeding.    Patient advised to notify care team with any new COVID like symptoms prior to procedure. Day of procedure phone number: Faby at [phone]    Patient is aware of visitor policy.    Patient expresses understanding of above instructions and denies further questions at this time.    Zunilda Feliz RN  [facility]

## 2025-04-15 NOTE — TELEPHONE ENCOUNTER
----- Message from Zunilda AVITIA sent at 4/10/2025 11:05 AM CDT -----  Regarding: FW: 4\18\25  [facility] radial approach  Pre-procedure call.   Patient to have extra hydration per office visit note:     1. Schedule coronary angiogram with ideally radial approach, limiting contrast as able and will treat pre and post with IV fluids   2. Hold losartan-hydrochlorothiazide day of angiogram to help limit kidney risks     FYI- this may be a duplicate message. I could not tell if it was already postponed.   Zunilda  ----- Message -----  From: Barbara Rizzo RN  Sent: 4/1/2025  11:08 AM CDT  To: [facility] Team 3  Subject: 4\18\25  [facility] radial approach                     Forwarding so date is in the front  ----- Message -----  From: Doreen Yoon  Sent: 3/31/2025   1:20 PM CDT  To: Fidelia Kenyon; Malissa Melchor; [facility] Team 3  Subject: Left Heart Cath with Radial Approach in [facility]#    MRN: [id number]    Location: [address] [address] - Main Entrance of the [facility]    Procedure: Coronary Angiogram with Radial Approach    Diagnosis: Severe aortic valve stenosis, Chest pain unspecified type, Coronary artery calcification, Worsening angina    Procedure Date: 4/18/25    Patient Arrival Time: 8:30a    Procedure Time: 10:30a    Ordering Cardiologist: Dr. Church    Performing Cardiologist: Dr. Auguste    Cardiac Assessment Completed: Yes  Date: 3/31/25  Provider: PLACIDO Deal    Pre-Procedure Labs completed: on admit    Post Procedure DARION appointment scheduled: Yes  Date: 4/23/25  Provider: PLACIDO Deal    Patient Diabetic on Meds/Insulin:  No  If on Metformin/Synjardy, has 2 day post procedure BMP been scheduled: No  (Thursday and Friday procedures should have Monday BMP)  Patient on Coumadin/Warfarin:  No  Patient on Pradaxa/Xarelto/Eliquis:  No  Patient on Invokana/Farxiga/Inpefa/Jardiance/Steglatro/Synjardy:  No  Patient on  Adlyxin/Bydureon/Byetta/Mounjaro/Fabioempic/Rybelsus/Soliquo/Trulicity/Victoza/Wegovy/Zepbound: No    Does Patient have a history of bypass:  No    Appointment was scheduled: Face to Face    Special instructions:  ##Tier 1##DANIELLE ONLY##RADIAL APPROACH##

## 2025-04-15 NOTE — PROGRESS NOTES
Orders placed for upcoming angiogram  Patient to receive additional IVF prior to angiogram. Patient will arrive @ 0630    Zunilda Feliz RN on 4/15/2025 at 2:50 PM

## 2025-04-18 ENCOUNTER — HOSPITAL ENCOUNTER (OUTPATIENT)
Facility: CLINIC | Age: 60
Discharge: HOME | End: 2025-04-18
Attending: INTERNAL MEDICINE | Admitting: INTERNAL MEDICINE
Payer: MEDICARE

## 2025-04-18 VITALS
OXYGEN SATURATION: 99 % | HEART RATE: 69 BPM | DIASTOLIC BLOOD PRESSURE: 65 MMHG | BODY MASS INDEX: 31.4 KG/M2 | SYSTOLIC BLOOD PRESSURE: 130 MMHG | RESPIRATION RATE: 16 BRPM | HEIGHT: 69 IN | TEMPERATURE: 98 F | WEIGHT: 212 LBS

## 2025-04-18 DIAGNOSIS — I73.9 PAD (PERIPHERAL ARTERY DISEASE): ICD-10-CM

## 2025-04-18 DIAGNOSIS — I35.0 SEVERE AORTIC VALVE STENOSIS: ICD-10-CM

## 2025-04-18 DIAGNOSIS — R07.9 CHEST PAIN, UNSPECIFIED TYPE: ICD-10-CM

## 2025-04-18 DIAGNOSIS — N17.9 AKI (ACUTE KIDNEY INJURY): ICD-10-CM

## 2025-04-18 DIAGNOSIS — I70.0 ATHEROSCLEROSIS OF AORTA: ICD-10-CM

## 2025-04-18 DIAGNOSIS — I35.0 NONRHEUMATIC AORTIC VALVE STENOSIS: ICD-10-CM

## 2025-04-18 DIAGNOSIS — I25.10 CORONARY ARTERY CALCIFICATION: ICD-10-CM

## 2025-04-18 DIAGNOSIS — I10 HTN (HYPERTENSION): ICD-10-CM

## 2025-04-18 LAB
ANION GAP SERPL CALCULATED.3IONS-SCNC: 16 MMOL/L (ref 7–15)
APTT PPP: 21 SECONDS (ref 22–38)
BUN SERPL-MCNC: 21.8 MG/DL (ref 8–23)
CALCIUM SERPL-MCNC: 10 MG/DL (ref 8.8–10.4)
CHLORIDE SERPL-SCNC: 104 MMOL/L (ref 98–107)
CREAT SERPL-MCNC: 1.83 MG/DL (ref 0.67–1.17)
EGFRCR SERPLBLD CKD-EPI 2021: 41 ML/MIN/1.73M2
ERYTHROCYTE [DISTWIDTH] IN BLOOD BY AUTOMATED COUNT: 12 % (ref 10–15)
GLUCOSE SERPL-MCNC: 89 MG/DL (ref 70–99)
HCO3 SERPL-SCNC: 20 MMOL/L (ref 22–29)
HCT VFR BLD AUTO: 34.7 % (ref 40–53)
HGB BLD-MCNC: 11.9 G/DL (ref 13.3–17.7)
INR PPP: 0.97 (ref 0.85–1.15)
MCH RBC QN AUTO: 34.6 PG (ref 26.5–33)
MCHC RBC AUTO-ENTMCNC: 34.3 G/DL (ref 31.5–36.5)
MCV RBC AUTO: 101 FL (ref 78–100)
PLATELET # BLD AUTO: 153 10E3/UL (ref 150–450)
POTASSIUM SERPL-SCNC: 4.1 MMOL/L (ref 3.4–5.3)
RBC # BLD AUTO: 3.44 10E6/UL (ref 4.4–5.9)
SODIUM SERPL-SCNC: 140 MMOL/L (ref 135–145)
WBC # BLD AUTO: 6.8 10E3/UL (ref 4–11)

## 2025-04-18 PROCEDURE — 999N000071 HC STATISTIC HEART CATH LAB OR EP LAB

## 2025-04-18 PROCEDURE — 85027 COMPLETE CBC AUTOMATED: CPT | Performed by: INTERNAL MEDICINE

## 2025-04-18 PROCEDURE — C1887 CATHETER, GUIDING: HCPCS | Performed by: INTERNAL MEDICINE

## 2025-04-18 PROCEDURE — C1894 INTRO/SHEATH, NON-LASER: HCPCS | Performed by: INTERNAL MEDICINE

## 2025-04-18 PROCEDURE — 36415 COLL VENOUS BLD VENIPUNCTURE: CPT | Performed by: INTERNAL MEDICINE

## 2025-04-18 PROCEDURE — C1769 GUIDE WIRE: HCPCS | Performed by: INTERNAL MEDICINE

## 2025-04-18 PROCEDURE — 85730 THROMBOPLASTIN TIME PARTIAL: CPT | Performed by: INTERNAL MEDICINE

## 2025-04-18 PROCEDURE — 82310 ASSAY OF CALCIUM: CPT | Performed by: INTERNAL MEDICINE

## 2025-04-18 PROCEDURE — 999N000184 HC STATISTIC TELEMETRY

## 2025-04-18 PROCEDURE — 99153 MOD SED SAME PHYS/QHP EA: CPT | Performed by: INTERNAL MEDICINE

## 2025-04-18 PROCEDURE — 93571 IV DOP VEL&/PRESS C FLO 1ST: CPT | Mod: 26 | Performed by: INTERNAL MEDICINE

## 2025-04-18 PROCEDURE — 85610 PROTHROMBIN TIME: CPT | Performed by: INTERNAL MEDICINE

## 2025-04-18 PROCEDURE — 99152 MOD SED SAME PHYS/QHP 5/>YRS: CPT | Performed by: INTERNAL MEDICINE

## 2025-04-18 PROCEDURE — 80048 BASIC METABOLIC PNL TOTAL CA: CPT | Performed by: INTERNAL MEDICINE

## 2025-04-18 PROCEDURE — 93799 UNLISTED CV SVC/PROCEDURE: CPT | Performed by: INTERNAL MEDICINE

## 2025-04-18 PROCEDURE — 258N000003 HC RX IP 258 OP 636: Performed by: INTERNAL MEDICINE

## 2025-04-18 PROCEDURE — 250N000009 HC RX 250: Performed by: INTERNAL MEDICINE

## 2025-04-18 PROCEDURE — 250N000011 HC RX IP 250 OP 636: Performed by: INTERNAL MEDICINE

## 2025-04-18 PROCEDURE — 93454 CORONARY ARTERY ANGIO S&I: CPT | Mod: 26 | Performed by: INTERNAL MEDICINE

## 2025-04-18 PROCEDURE — 93454 CORONARY ARTERY ANGIO S&I: CPT | Performed by: INTERNAL MEDICINE

## 2025-04-18 PROCEDURE — 272N000001 HC OR GENERAL SUPPLY STERILE: Performed by: INTERNAL MEDICINE

## 2025-04-18 PROCEDURE — 93005 ELECTROCARDIOGRAM TRACING: CPT

## 2025-04-18 PROCEDURE — 93572 IV DOP VEL&/PRESS C FLO EA: CPT | Mod: 26 | Performed by: INTERNAL MEDICINE

## 2025-04-18 RX ORDER — SODIUM CHLORIDE 9 MG/ML
INJECTION, SOLUTION INTRAVENOUS CONTINUOUS
Status: DISCONTINUED | OUTPATIENT
Start: 2025-04-18 | End: 2025-04-18 | Stop reason: HOSPADM

## 2025-04-18 RX ORDER — LIDOCAINE 40 MG/G
CREAM TOPICAL
Status: DISCONTINUED | OUTPATIENT
Start: 2025-04-18 | End: 2025-04-18 | Stop reason: HOSPADM

## 2025-04-18 RX ORDER — OXYCODONE HYDROCHLORIDE 5 MG/1
5 TABLET ORAL EVERY 4 HOURS PRN
Status: DISCONTINUED | OUTPATIENT
Start: 2025-04-18 | End: 2025-04-18 | Stop reason: HOSPADM

## 2025-04-18 RX ORDER — NALOXONE HYDROCHLORIDE 0.4 MG/ML
0.4 INJECTION, SOLUTION INTRAMUSCULAR; INTRAVENOUS; SUBCUTANEOUS
Status: DISCONTINUED | OUTPATIENT
Start: 2025-04-18 | End: 2025-04-18 | Stop reason: HOSPADM

## 2025-04-18 RX ORDER — FENTANYL CITRATE 50 UG/ML
INJECTION, SOLUTION INTRAMUSCULAR; INTRAVENOUS
Status: DISCONTINUED | OUTPATIENT
Start: 2025-04-18 | End: 2025-04-18 | Stop reason: HOSPADM

## 2025-04-18 RX ORDER — FLUMAZENIL 0.1 MG/ML
0.2 INJECTION, SOLUTION INTRAVENOUS
Status: DISCONTINUED | OUTPATIENT
Start: 2025-04-18 | End: 2025-04-18 | Stop reason: HOSPADM

## 2025-04-18 RX ORDER — NALOXONE HYDROCHLORIDE 0.4 MG/ML
0.2 INJECTION, SOLUTION INTRAMUSCULAR; INTRAVENOUS; SUBCUTANEOUS
Status: DISCONTINUED | OUTPATIENT
Start: 2025-04-18 | End: 2025-04-18 | Stop reason: HOSPADM

## 2025-04-18 RX ORDER — HEPARIN SODIUM 1000 [USP'U]/ML
INJECTION, SOLUTION INTRAVENOUS; SUBCUTANEOUS
Status: DISCONTINUED | OUTPATIENT
Start: 2025-04-18 | End: 2025-04-18 | Stop reason: HOSPADM

## 2025-04-18 RX ORDER — ATROPINE SULFATE 0.1 MG/ML
0.5 INJECTION INTRAVENOUS
Status: DISCONTINUED | OUTPATIENT
Start: 2025-04-18 | End: 2025-04-18 | Stop reason: HOSPADM

## 2025-04-18 RX ORDER — IOPAMIDOL 755 MG/ML
INJECTION, SOLUTION INTRAVASCULAR
Status: DISCONTINUED | OUTPATIENT
Start: 2025-04-18 | End: 2025-04-18 | Stop reason: HOSPADM

## 2025-04-18 RX ORDER — FENTANYL CITRATE 50 UG/ML
25 INJECTION, SOLUTION INTRAMUSCULAR; INTRAVENOUS
Status: DISCONTINUED | OUTPATIENT
Start: 2025-04-18 | End: 2025-04-18 | Stop reason: HOSPADM

## 2025-04-18 RX ORDER — ASPIRIN 81 MG/1
243 TABLET, CHEWABLE ORAL ONCE
Status: DISCONTINUED | OUTPATIENT
Start: 2025-04-18 | End: 2025-04-18 | Stop reason: HOSPADM

## 2025-04-18 RX ORDER — ACETAMINOPHEN 325 MG/1
650 TABLET ORAL EVERY 4 HOURS PRN
Status: DISCONTINUED | OUTPATIENT
Start: 2025-04-18 | End: 2025-04-18 | Stop reason: HOSPADM

## 2025-04-18 RX ORDER — OXYCODONE HYDROCHLORIDE 5 MG/1
10 TABLET ORAL EVERY 4 HOURS PRN
Status: DISCONTINUED | OUTPATIENT
Start: 2025-04-18 | End: 2025-04-18 | Stop reason: HOSPADM

## 2025-04-18 RX ORDER — ASPIRIN 325 MG
325 TABLET ORAL ONCE
Status: DISCONTINUED | OUTPATIENT
Start: 2025-04-18 | End: 2025-04-18 | Stop reason: HOSPADM

## 2025-04-18 RX ORDER — VERAPAMIL HYDROCHLORIDE 2.5 MG/ML
INJECTION INTRAVENOUS
Status: DISCONTINUED | OUTPATIENT
Start: 2025-04-18 | End: 2025-04-18 | Stop reason: HOSPADM

## 2025-04-18 RX ORDER — POTASSIUM CHLORIDE 1500 MG/1
20 TABLET, EXTENDED RELEASE ORAL
Status: DISCONTINUED | OUTPATIENT
Start: 2025-04-18 | End: 2025-04-18 | Stop reason: HOSPADM

## 2025-04-18 RX ADMIN — SODIUM CHLORIDE: 0.9 INJECTION, SOLUTION INTRAVENOUS at 07:12

## 2025-04-18 ASSESSMENT — ACTIVITIES OF DAILY LIVING (ADL)
ADLS_ACUITY_SCORE: 46

## 2025-04-18 NOTE — DISCHARGE INSTRUCTIONS
Cardiac Angiogram Discharge Instructions - Radial    After you go home:    Have an adult stay with you until tomorrow.  Drink extra fluids for 2 days.  You may resume your normal diet.  No smoking       For 24 hours - due to the sedation you received:  Relax and take it easy.  Do NOT make any important or legal decisions.  Do NOT drive or operate machines at home or at work.  Do NOT drink alcohol.    Care of Wrist Puncture Site:    For the first 24 hrs - check the puncture site every 1-2 hours while awake.  It is normal to have soreness at the puncture site and mild tingling in your hand for up to 3 days.  Remove the bandaid after 24 hours. If there is minor oozing, apply another bandaid and remove it after 12 hours.  You may shower tomorrow.  Do NOT take a bath, or use a hot tub or pool for at least 3 days. Do NOT scrub the site. Do not use lotion or powder near the puncture site.           Activity:        For 2 days:   do not use your hand or arm to support your weight (such as rising from a chair)   do not bend your wrist (such as lifting a garage door).  do not lift more than 5 pounds or exercise your arm (such as tennis, golf or bowling).  Do NOT do any heavy activity such as exercise, lifting, or straining.     Bleeding:    If you start bleeding from the site in your wrist, sit down and press firmly on/above the site for 10 minutes.   Once bleeding stops, keep arm still for 2 hours.   Call [facility] as soon as you can.       Call 911 right away if you have heavy bleeding or bleeding that does not stop.      Medicines:    If you are taking an antiplatelet medication such as Plavix, Brilinta or Effient, do not stop taking it until you talk to your cardiologist.      If you are on Metformin (Glucophage), do not restart it until you have blood tests (within 2 to 3 days after discharge).  After you have your blood drawn, you may restart the Metformin.   Take your medications, including blood thinners, unless your  provider tells you not to.    If you take Coumadin (Warfarin), have your INR checked by your provider in  3-5 days. Call your clinic to schedule this.  If you have stopped any medicines, check with your provider about when to restart them.    Follow Up Appointments:    Follow up with [facility] Nurse Practitioner at [facility] of patient preference in 7-10 days.    Call the clinic if:    You have a large or growing hard lump around the site.  The site is red, swollen, hot or tender.  Blood or fluid is draining from the site.  You have chills or a fever greater than 101 F (38 C).  Your arm feels numb, cool or changes color.  You have hives, a rash or unusual itching.  Any questions or concerns.          [facility] [place]:    [phone] [facility] (7 days a week)    Or you may contact your provider via My Chart

## 2025-04-18 NOTE — PROGRESS NOTES
Care Suites Discharge Nursing Note    Patient Information  Name: Grey Candelario  Age: 59 year old    Discharge Education:  Discharge instructions reviewed: Yes  Additional education/resources provided: per cardiology  Patient/patient representative verbalizes understanding: Yes  Patient discharging on new medications: No  Medication education completed: Yes    Discharge Plans:   Discharge location: home  Discharge ride contacted: Yes  Approximate discharge time: 1500    Discharge Criteria:  Discharge criteria met and vital signs stable: Yes    Patient Belongs:  Patient belongings returned to patient: Yes    Javi Salmeron RN

## 2025-04-18 NOTE — PROGRESS NOTES
Care Suites Admission Nursing Note    Patient Information  Name: Grey Candelario  Age: 59 year old  Reason for admission: Angiogram  Care Suites arrival time: 0640    Visitor Information  Name: Mariam     Patient Admission/Assessment   Pre-procedure assessment complete: Yes  If abnormal assessment/labs, provider notified: N/A  NPO: Yes  Medications held per instructions/orders: Yes  Consent: deferred  If applicable, pregnancy test status: deferred  Patient oriented to room: Yes  Education/questions answered: Yes  Plan/other: Prep for procedure    Discharge Planning  Discharge name/phone number: Mariam  Discharge location: home    Javi Salmeron RN

## 2025-04-18 NOTE — Clinical Note
Hemodynamic equipment used: 5 lead ECG, BiogazelleK With 3 Leads, Machine BP Cuff and pulse oximeter probe.

## 2025-04-18 NOTE — PROGRESS NOTES
Care Suites Post Procedure Note    Patient Information  Name: Grey Candelario  Age: 59 year old    Post Procedure  Time patient returned to Care Suites: 1125  Concerns/abnormal assessment: no  If abnormal assessment, provider notified: N/A  Plan/Other: Recover in care suites, remove TR band per protocol.  Monitor.    Annalise Calderon RN

## 2025-04-21 ENCOUNTER — PATIENT OUTREACH (OUTPATIENT)
Dept: CARDIOLOGY | Facility: CLINIC | Age: 60
End: 2025-04-21
Payer: MEDICARE

## 2025-04-21 NOTE — PROGRESS NOTES
"[facility] -      Spoke with Grey who states his wrist and everything is \"fine\". Discussed canceling the appt with Shawna on 4/23 and keeping Dr. Mercado on 4/24, he is agreeable to this.      Future Appointments   Date Time Provider Department Center   4/24/2025 10:15 AM Figueroa Mercado MD [place] [facility] CLIN     Tiffanie Bhandari RN BAN   3:49 PM 4/21/2025    Nurse line M-F 8a-4p: [phone]    "

## 2025-04-21 NOTE — PROGRESS NOTES
[facility] -     Pt has follow-up with Shawna eDal NP on 4/23 and Dr. Mercado on 4/24. Called to discuss appts. Pt does not need both appts. Pt had radial angio on 4/18.    Future Appointments   Date Time Provider Department Center   4/23/2025 12:40 PM Elizabeth Deal APRN CNP [facility] CLIN   4/24/2025 10:15 AM Figueroa Mercado MD [place] [facility] CLIN     TATIANA Aggarwal   3:44 PM 4/21/2025    Nurse line M-F 8a-4p: [phone]

## 2025-04-22 ENCOUNTER — TELEPHONE (OUTPATIENT)
Dept: CARDIOLOGY | Facility: CLINIC | Age: 60
End: 2025-04-22
Payer: MEDICARE

## 2025-04-22 LAB
ATRIAL RATE - MUSE: 72 BPM
DIASTOLIC BLOOD PRESSURE - MUSE: NORMAL MMHG
INTERPRETATION ECG - MUSE: NORMAL
P AXIS - MUSE: 59 DEGREES
PR INTERVAL - MUSE: 178 MS
QRS DURATION - MUSE: 80 MS
QT - MUSE: 416 MS
QTC - MUSE: 455 MS
R AXIS - MUSE: 65 DEGREES
SYSTOLIC BLOOD PRESSURE - MUSE: NORMAL MMHG
T AXIS - MUSE: 66 DEGREES
VENTRICULAR RATE- MUSE: 72 BPM

## 2025-04-22 NOTE — TELEPHONE ENCOUNTER
Patient was admitted to [facility] on 4/18/25 with bicuspid aortic stenosis who is referred for coronary angiogram.    4/18/25: Coronary angiogram via RRA showed severe single vessel coronary artery disease (proximal/mid LAD involving 1st diagonal).    No medication changes made.    Pt is scheduled for an OV on 4/24/25 at 1005 with Dr. Mercado for a new TAVR appt at our [place] Office.    Writer attempted to call pt for a cardiology post discharge phone call, but no answer. VM left to call back with any non emergent cardiac related questions. Reminder left that the RRA access site should be healing without signs of infection, swelling or bleeding. Reminder left of appt as scheduled above. Writer's phone number was provided. DEEP Avila RN.

## 2025-04-24 ENCOUNTER — OFFICE VISIT (OUTPATIENT)
Dept: CARDIOLOGY | Facility: CLINIC | Age: 60
End: 2025-04-24
Payer: MEDICARE

## 2025-04-24 ENCOUNTER — LAB (OUTPATIENT)
Dept: LAB | Facility: CLINIC | Age: 60
End: 2025-04-24
Payer: MEDICARE

## 2025-04-24 VITALS
HEIGHT: 69 IN | HEART RATE: 66 BPM | WEIGHT: 214 LBS | OXYGEN SATURATION: 97 % | BODY MASS INDEX: 31.7 KG/M2 | SYSTOLIC BLOOD PRESSURE: 147 MMHG | DIASTOLIC BLOOD PRESSURE: 85 MMHG | RESPIRATION RATE: 18 BRPM

## 2025-04-24 DIAGNOSIS — I35.0 SEVERE AORTIC VALVE STENOSIS: ICD-10-CM

## 2025-04-24 DIAGNOSIS — I35.0 NONRHEUMATIC AORTIC VALVE STENOSIS: Primary | ICD-10-CM

## 2025-04-24 DIAGNOSIS — R07.9 CHEST PAIN, UNSPECIFIED TYPE: ICD-10-CM

## 2025-04-24 DIAGNOSIS — I25.10 CORONARY ARTERY CALCIFICATION: ICD-10-CM

## 2025-04-24 LAB
ANION GAP SERPL CALCULATED.3IONS-SCNC: 14 MMOL/L (ref 7–15)
BUN SERPL-MCNC: 23.8 MG/DL (ref 8–23)
CALCIUM SERPL-MCNC: 10 MG/DL (ref 8.8–10.4)
CHLORIDE SERPL-SCNC: 102 MMOL/L (ref 98–107)
CREAT SERPL-MCNC: 2.36 MG/DL (ref 0.67–1.17)
EGFRCR SERPLBLD CKD-EPI 2021: 31 ML/MIN/1.73M2
GLUCOSE SERPL-MCNC: 96 MG/DL (ref 70–99)
HCO3 SERPL-SCNC: 23 MMOL/L (ref 22–29)
POTASSIUM SERPL-SCNC: 4.5 MMOL/L (ref 3.4–5.3)
SODIUM SERPL-SCNC: 139 MMOL/L (ref 135–145)

## 2025-04-24 NOTE — PROGRESS NOTES
Cardiac Surgery Aortic Valve Clinic Note    Date of Service: 4/24/2025    REASON FOR CONSULTATION: severe, symptomatic aortic valve stenosis     SEEN IN CLINIC WITH: Dr. Mercado    Mr. Grey Candelario is a 59 year old with severe symptomatic aortic stenosis. As a part of the multidisciplinary valve conference, we had a conversation about options for aortic valve intervention, which included TAVR and SAVR.    PMH notable for:  Grey Candelario is a pleasant 59 year old male with past medical history significant for former smoker, hypolipidemia, HTN, severe CAC, severe aortic stenosis (likely bicuspid), severe PAD with multiple prior bilateral iliac stents and right iliofemoral bypass and left femoral endarterectomy with patch angioplasty in 2022 (previous followed at [facility]), CKD with prior ELVA and BERNARD last in 2023 followed by Nephrology, prior spine surgery with lumbar fusion and repeat spine surgery with subsequent neuropathy who presents today for H&P for coronary angiogram and discuss his risk assessment.     Very pleasant 59M with PMH notable for PAD and recent lumbar fusion. We talked in detail about his symptoms. He has been very limited due to his PAD, although he does consistently experience exertional angina. Invasive testing recently identified a hemodynamically significant LAD disease.    On account of his severe aortic stenosis and concomitant LAD disease he would have a strong indication for surgery. Especially at his young age, he would benefit from the use of a mechanical prosthesis. That said, he is very reluctant to pursue surgery. He is very pessimistic about his long-term outcomes given his progressive debility related to his PAD. The long-term benefit of LIMA-LAD and a mechanical valve does not carry significant value for him.    We discussed that even though he has renal dysfunction and prior ELVA it would be reasonable then to proceed with a TAVR-protocol CT to understand if a percutaneous  option would be feasible. He would have higher likelihood of needing alternative access given his peripheral disease. CT would delineate what options we might have, or if surgery will remain the best path forward for his aortic stenosis and CAD.    1) Recommend ongoing evaluation for TAVR to include protocol CT, to be scheduled by the valve clinic    Michael S. Mulvihill, M.D.  Cardiothoracic Surgery    CARDIAC CATHETERIZATION:  Result Date: 4/21/2025  Severe single vessel coronary artery disease (Proximal/mid LAD involving 1st diagonal)      Coronary Findings       Diagnostic  Dominance: Right              Left Main   The vessel was visualized by selective angiography and is moderate in size. There was 0%  vessel disease.       Left Anterior Descending   Prox LAD to Mid LAD lesion is 70% stenosed. iFR=0.70       Left Circumflex   Mid Cx lesion is 30% stenosed.       Right Coronary Artery   Prox RCA lesion is 25% stenosed.   Mid RCA lesion is 40% stenosed.       Intervention            No interventions have been documented.             TRANSTHORACIC ECHOCARDIOGRAPHY:  12/19/2024  Interpretation Summary     The visual ejection fraction is 65-70%.  The right ventricle is normal in size and function. Mild RVH.  The aortic Sinus(es) of Valsalva are mildly dilated at 39 mm.  The aortic valve is not well visualized. Likely bicuspid valve. Severe  valvular aortic stenosis. Peak velocity is over 4 m/sec.     Compared to the prior study, AS has worsened.  ______________________________________________________________________________  Left Ventricle  The left ventricle is normal in size. There is mild eccentric left ventricular  hypertrophy. The visual ejection fraction is 65-70%. Diastolic Doppler  findings (E/E' ratio and/or other parameters) suggest left ventricular filling  pressures are indeterminate. No regional wall motion abnormalities noted.     Right Ventricle  There is mild right ventricular hypertrophy. The right  ventricle is normal in  size and function.     Atria  Normal left atrial size. Right atrial size is normal.     Mitral Valve  The mitral valve is normal in structure and function. There is trace mitral  regurgitation. There is no mitral valve stenosis.     Tricuspid Valve  The tricuspid valve is not well visualized, but is grossly normal. There is  trace tricuspid regurgitation. Right ventricular systolic pressure could not  be approximated due to inadequate tricuspid regurgitation.     Aortic Valve  The aortic valve is not well visualized. Likely bicuspid valve. There is trace  aortic regurgitation. Increased aortic valve velocity. The mean AoV pressure  gradient is 26.0 mmHg. The calculated aortic valve are is 0.74 cm^2. The peak  AoV pressure gradient is 53.0 mmHg. Severe valvular aortic stenosis.     Pulmonic Valve  The pulmonic valve is not well visualized.     Vessels  The aortic Sinus(es) of Valsalva are mildly dilated. The inferior vena cava  was normal in size with preserved respiratory variability.     Pericardium  There is no pericardial effusion.

## 2025-04-24 NOTE — PROGRESS NOTES
Cardiology Consultation       Assessment & Plan     1.  Severe aortic stenosis possible bicuspid  2.  History of coronary artery disease with mid LAD by FFR functionally significant  3.  History of PAD with bilateral iliofemoral angioplasty and stenting in 2019  4.  Status post femoral endarterectomy  5.  History of alcohol abuse  6.  Chronic kidney disease      Recommendation    1.  Severe symptomatic aortic stenosis, possible bicuspid: We discussed the pathophysiology and potential therapeutic measures to address this.  We also had a long conversation with with the patient regarding his age and durability of TAVR valves.  Our recommendation was to strongly consider surgical aortic valve replacement and LIMA as this would obviate the need for further contrast which is his main concern.  In the past he has had contrast-induced nephropathy.      2.  After our discussion patient strongly prefers to consider TAVR.  We also discussed with him that given his PAD alternative access may also need to be considered.  As an initial test we will get a BMP to make sure that his creatinine has not significantly changed above his baseline after his recent diagnostic coronary angiography.  Provided it is safe to proceed we will get a TAVR directed CT.  Following that we will review and discuss with him next steps for procedural planning.  Provided everything is favorable he will also need PCI to his LAD at some point.      Thank you kindly for consult    The longitudinal plan of care for the diagnosis(es)/condition(s) as documented were addressed during this visit. Due to the added complexity in care, I will continue to support Grey in the subsequent management and with ongoing continuity of care.           Figueroa Mercado MD, MD            History of present illness    Patient is a 59-year-old gentleman with above-mentioned past medical history.  He presents to the TAVR clinic for evaluation.  He is seen jointly with  my cardiothoracic surgery colleague.  His wife accompanies him to today's clinic visit.  Patient has noticed over the last several months progressive shortness of breath and occasionally will have chest discomfort.  He ultimately underwent coronary angiography via radial approach.  Single-vessel disease was identified with FFR suggesting the mid LAD was functionally significant.  Given the contrast and his age and unclear whether he was a TAVR or SAVR candidate, PCI was deferred.  Here to discuss findings of his testing and plan for additional care.          Echo 12/24  The visual ejection fraction is 65-70%.  The right ventricle is normal in size and function. Mild RVH.  The aortic Sinus(es) of Valsalva are mildly dilated at 39 mm.  The aortic valve is not well visualized. Likely bicuspid valve. Severe  valvular aortic stenosis. Peak velocity is over 4 m/sec.     Compared to the prior study, AS has worsened              Primary Care Physician   Santosh Ortega      Patient Active Problem List   Diagnosis    Post-op pain    Nausea    BERNARD (acute kidney injury)    Anemia, unspecified type       Past Medical History   I have reviewed this patient's medical history and updated it with pertinent information if needed.   Past Medical History:   Diagnosis Date    Current smoker     HTN (hypertension)     Hx of heart artery stent     2018 2 stents placed    Hyperlipidemia        Past Surgical History   I have reviewed this patient's surgical history and updated it with pertinent information if needed.  Past Surgical History:   Procedure Laterality Date    CV CORONARY ANGIOGRAM N/A 4/18/2025    Procedure: Coronary Angiogram;  Surgeon: Leonidas Auguste MD;  Location: [facility] CARDIAC CATH LAB    CV INSTANTANEOUS WAVE-FREE RATIO N/A 4/18/2025    Procedure: Instantaneous Wave-Free Ratio;  Surgeon: Leonidas Auguste MD;  Location:  HEART CARDIAC CATH LAB    IR LOWER EXTREMITY ANGIOGRAM BILATERAL  11/14/2019    IR RENAL  BIOPSY RIGHT  5/10/2023    OTHER SURGICAL HISTORY      hip surgery , hardware in place.       Prior to Admission Medications   Cannot display prior to admission medications because the patient has not been admitted in this contact.     [unfilled]  [unfilled]  Allergies   Allergies   Allergen Reactions    Jardiance [Empagliflozin] Diarrhea     Lab abnormalities     Morphine Hcl Hives       Social History    reports that he has been smoking cigarettes. He has never used smokeless tobacco. He reports current alcohol use. He reports that he does not use drugs.    Family History   No family history on file.    Review of Systems   The comprehensive 10 point Review of Systems is negative other than noted in the HPI or here.     Physical Exam   Vital Signs with Ranges     Wt Readings from Last 4 Encounters:   25 96.2 kg (212 lb)   25 96.9 kg (213 lb 11.2 oz)   24 95.7 kg (210 lb 14.4 oz)   23 87 kg (191 lb 12.8 oz)     [unfilled]      Vitals: There were no vitals taken for this visit.    @LABRCNTIPR(tropi:5,troponinies:5)@    @LABRCNTIPR(wbc:3,hgb:3,mcv:3,plt:3,inr:3,na:3,potassium:3,chloride:3,co2:3,bun:3,cr:3,gfrestimated:3,gfrestblack:3,aniongap:3,estuardo:3,glc:3,albumin:2,prottotal:2,bilitotal:2,alkphos:2,alt:2,ast:2,lipase:2,tropi:3)@  Recent Labs   Lab Test 23  1339 10/04/22  1622 19  1305 18  1120   CHOL  --   --  184 255*   HDL  --   --  79 96   LDL  --   --  90 144*   TRIG 159* 98 77 76     @LABRCNTIP(wbc:3,hgb:3,hct:3,mcv:3,plt:3,iron:3,ironsat:3,reticabsct:3,retp:3,feb:3,darrion:3,b12:3,folic:3,epoe:3,morph:3)@  @LABRCNTIP(PH:3,PHV:3,PO2:3,PO2V:3,sat:3,PCO2:3,PCO2V:3,HCO3:3,HCO3V:3)@  @LABRCNTIP(NTBNPI:3,NTBNP:3)@  @LABRCNTIP(DD:1)@  @LABRCNTIP(sed:3,crp:3)@  @LABRCNTIP(PLT:3)@  @LABRCNTIP(TSH:3)@  @LABRCNTIP(color:1,appearance:1,urineg,urinebili:1,urineketone:1,s,ubld:1,urineph:1,protein:1,urobilinogen:1,nitrite:1,leukest:1,rbcu:1,wbcu:1)@    Imaging:  No  "results found for this or any previous visit (from the past 48 hours).    Echo:  No results found for this or any previous visit (from the past 4320 hours).    Clinically Significant Risk Factors Present on Admission                             # Obesity: Estimated body mass index is 31.31 kg/m  as calculated from the following:    Height as of 4/18/25: 1.753 m (5' 9\").    Weight as of 4/18/25: 96.2 kg (212 lb).                The longitudinal plan of care for the diagnosis(es)/condition(s) as documented were addressed during this visit. Due to the added complexity in care, I will continue to support Grey in the subsequent management and with ongoing continuity of care.           "

## 2025-04-24 NOTE — PROGRESS NOTES
TAVR Coordinator visit:  Provided additional education regarding TAVR procedure, after being present for discussion with physician. Explained the work-up process and next steps for patient. Patient provided our direct contact number and instructed to call with any questions.     Completed frailty testing and KCCQ.   5 meter walk: 3.8 seconds             4.2 seconds             3.6 seconds  Frailty score: 0/5    KCCQ Results:   1a. 3  1b. 2  1c. 6  2. 5  3. 1  4. 5  5. 5  6. 5  7. 3  8a. 4  8b. 4  8c. 5    Preliminary STS Risk Score: 1.67%  NYHA Class: I-II    Plan to proceed with TAVR CT and will review results at valve conference once available.     Joanna Ivan RN  Structural Heart Coordinator  [facility]

## 2025-04-24 NOTE — LETTER
4/24/2025    Santosh Ortega MD  [address]  [place] [address]    RE: Grey Roquearabella       Dear Colleague,     I had the pleasure of seeing Grey FISH Kodak in the [facility].      Cardiology Consultation       Assessment & Plan    1.  Severe aortic stenosis possible bicuspid  2.  History of coronary artery disease with mid LAD by FFR functionally significant  3.  History of PAD with bilateral iliofemoral angioplasty and stenting in 2019  4.  Status post femoral endarterectomy  5.  History of alcohol abuse  6.  Chronic kidney disease      Recommendation    1.  Severe symptomatic aortic stenosis, possible bicuspid: We discussed the pathophysiology and potential therapeutic measures to address this.  We also had a long conversation with with the patient regarding his age and durability of TAVR valves.  Our recommendation was to strongly consider surgical aortic valve replacement and LIMA as this would obviate the need for further contrast which is his main concern.  In the past he has had contrast-induced nephropathy.    2.  After our discussion patient strongly prefers to consider TAVR.  We also discussed with him that given his PAD alternative access may also need to be considered.  As an initial test we will get a BMP to make sure that his creatinine has not significantly changed above his baseline after his recent diagnostic coronary angiography.  Provided it is safe to proceed we will get a TAVR directed CT.  Following that we will review and discuss with him next steps for procedural planning.    Thank you kindly for consult    The longitudinal plan of care for the diagnosis(es)/condition(s) as documented were addressed during this visit. Due to the added complexity in care, I will continue to support Grey in the subsequent management and with ongoing continuity of care.           Figueroa Mercado MD, MD            History of present illness    Patient is a 59-year-old  gentleman with above-mentioned past medical history.  He presents to the TAVR clinic for evaluation.  He is seen jointly with my cardiothoracic surgery colleague.  His wife accompanies him to today's clinic visit.  Patient has noticed over the last several months progressive shortness of breath and occasionally will have chest discomfort.  He ultimately underwent coronary angiography via radial approach.  Single-vessel disease was identified with FFR suggesting the mid LAD was functionally significant.  Given the contrast and his age and unclear whether he was a TAVR or SAVR candidate, PCI was deferred.  Here to discuss findings of his testing and plan for additional care.          Echo 12/24  The visual ejection fraction is 65-70%.  The right ventricle is normal in size and function. Mild RVH.  The aortic Sinus(es) of Valsalva are mildly dilated at 39 mm.  The aortic valve is not well visualized. Likely bicuspid valve. Severe  valvular aortic stenosis. Peak velocity is over 4 m/sec.     Compared to the prior study, AS has worsened              Primary Care Physician  Santosh Ortega      Patient Active Problem List   Diagnosis     Post-op pain     Nausea     BERNARD (acute kidney injury)     Anemia, unspecified type       Past Medical History  I have reviewed this patient's medical history and updated it with pertinent information if needed.   Past Medical History:   Diagnosis Date     Current smoker      HTN (hypertension)      Hx of heart artery stent     2018 2 stents placed     Hyperlipidemia        Past Surgical History  I have reviewed this patient's surgical history and updated it with pertinent information if needed.  Past Surgical History:   Procedure Laterality Date     CV CORONARY ANGIOGRAM N/A 4/18/2025    Procedure: Coronary Angiogram;  Surgeon: Leonidas Auguste MD;  Location: [facility] CARDIAC CATH LAB     CV INSTANTANEOUS WAVE-FREE RATIO N/A 4/18/2025    Procedure: Instantaneous Wave-Free Ratio;   Surgeon: Leonidas Auguste MD;  Location:  HEART CARDIAC CATH LAB     IR LOWER EXTREMITY ANGIOGRAM BILATERAL  11/14/2019     IR RENAL BIOPSY RIGHT  5/10/2023     OTHER SURGICAL HISTORY      hip surgery , hardware in place.       Prior to Admission Medications  Cannot display prior to admission medications because the patient has not been admitted in this contact.     [unfilled]  [unfilled]  Allergies  Allergies   Allergen Reactions     Jardiance [Empagliflozin] Diarrhea     Lab abnormalities      Morphine Hcl Hives       Social History   reports that he has been smoking cigarettes. He has never used smokeless tobacco. He reports current alcohol use. He reports that he does not use drugs.    Family History  No family history on file.    Review of Systems  The comprehensive 10 point Review of Systems is negative other than noted in the HPI or here.     Physical Exam  Vital Signs with Ranges     Wt Readings from Last 4 Encounters:   04/18/25 96.2 kg (212 lb)   03/31/25 96.9 kg (213 lb 11.2 oz)   11/19/24 95.7 kg (210 lb 14.4 oz)   05/11/23 87 kg (191 lb 12.8 oz)     [unfilled]      Vitals: There were no vitals taken for this visit.    @LABRCNTIPR(tropi:5,troponinies:5)@    @LABRCNTIPR(wbc:3,hgb:3,mcv:3,plt:3,inr:3,na:3,potassium:3,chloride:3,co2:3,bun:3,cr:3,gfrestimated:3,gfrestblack:3,aniongap:3,estuardo:3,glc:3,albumin:2,prottotal:2,bilitotal:2,alkphos:2,alt:2,ast:2,lipase:2,tropi:3)@  Recent Labs   Lab Test 04/20/23  1339 10/04/22  1622 11/14/19  1305 11/16/18  1120   CHOL  --   --  184 255*   HDL  --   --  79 96   LDL  --   --  90 144*   TRIG 159* 98 77 76  "    @LABRCNTIP(wbc:3,hgb:3,hct:3,mcv:3,plt:3,iron:3,ironsat:3,reticabsct:3,retp:3,feb:3,darrion:3,b12:3,folic:3,epoe:3,morph:3)@  @LABRCNTIP(PH:3,PHV:3,PO2:3,PO2V:3,sat:3,PCO2:3,PCO2V:3,HCO3:3,HCO3V:3)@  @LABRCNTIP(NTBNPI:3,NTBNP:3)@  @LABRCNTIP(DD:1)@  @LABRCNTIP(sed:3,crp:3)@  @LABRCNTIP(PLT:3)@  @LABRCNTIP(TSH:3)@  @LABRCNTIP(color:1,appearance:1,urineg,urinebili:1,urineketone:1,s,ubld:1,urineph:1,protein:1,urobilinogen:1,nitrite:1,leukest:1,rbcu:1,wbcu:1)@    Imaging:  No results found for this or any previous visit (from the past 48 hours).    Echo:  No results found for this or any previous visit (from the past 4320 hours).    Clinically Significant Risk Factors Present on Admission                            # Obesity: Estimated body mass index is 31.31 kg/m  as calculated from the following:    Height as of 25: 1.753 m (5' 9\").    Weight as of 25: 96.2 kg (212 lb).                The longitudinal plan of care for the diagnosis(es)/condition(s) as documented were addressed during this visit. Due to the added complexity in care, I will continue to support Grey in the subsequent management and with ongoing continuity of care.             Thank you for allowing me to participate in the care of your patient.      Sincerely,     Figueroa Mercado MD     [facility]  cc:   Elizabeth Deal, APRN CNP  [address]  [place] [address]      "

## 2025-05-01 ENCOUNTER — TELEPHONE (OUTPATIENT)
Dept: CARDIOLOGY | Facility: CLINIC | Age: 60
End: 2025-05-01
Payer: MEDICARE

## 2025-05-01 DIAGNOSIS — I35.0 SEVERE AORTIC VALVE STENOSIS: Primary | ICD-10-CM

## 2025-05-01 NOTE — TELEPHONE ENCOUNTER
Received call from TATIANA Velazquez with [facility]. She states that Dr. Downey reviewed patient's chart in Dr. Alfaro' abscence. Dr. Downey recommended patients tests requiring contrast be spaced at least one week apart. Will follow nate hydration protocol. Left voicemail for patient requesting call back to discuss recommendations and schedule TAVR CT.

## 2025-05-01 NOTE — TELEPHONE ENCOUNTER
Received return call from patient and his wife. Discussed recommendations per nephrology. Scheduled TAVR CT 5/13/25 with 2 hours of hydration prior.

## 2025-05-13 ENCOUNTER — TELEPHONE (OUTPATIENT)
Dept: CARDIOLOGY | Facility: CLINIC | Age: 60
End: 2025-05-13

## 2025-05-13 ENCOUNTER — LAB (OUTPATIENT)
Dept: LAB | Facility: CLINIC | Age: 60
End: 2025-05-13
Payer: MEDICARE

## 2025-05-13 ENCOUNTER — RESULTS FOLLOW-UP (OUTPATIENT)
Dept: CARDIOLOGY | Facility: CLINIC | Age: 60
End: 2025-05-13

## 2025-05-13 DIAGNOSIS — I35.0 SEVERE AORTIC VALVE STENOSIS: ICD-10-CM

## 2025-05-13 DIAGNOSIS — I35.0 SEVERE AORTIC VALVE STENOSIS: Primary | ICD-10-CM

## 2025-05-13 LAB
ANION GAP SERPL CALCULATED.3IONS-SCNC: 18 MMOL/L (ref 7–15)
BUN SERPL-MCNC: 36.9 MG/DL (ref 8–23)
CALCIUM SERPL-MCNC: 10.1 MG/DL (ref 8.8–10.4)
CHLORIDE SERPL-SCNC: 102 MMOL/L (ref 98–107)
CREAT SERPL-MCNC: 3.21 MG/DL (ref 0.67–1.17)
EGFRCR SERPLBLD CKD-EPI 2021: 21 ML/MIN/1.73M2
GLUCOSE SERPL-MCNC: 78 MG/DL (ref 70–99)
HCO3 SERPL-SCNC: 19 MMOL/L (ref 22–29)
POTASSIUM SERPL-SCNC: 4.7 MMOL/L (ref 3.4–5.3)
SODIUM SERPL-SCNC: 139 MMOL/L (ref 135–145)

## 2025-05-13 NOTE — TELEPHONE ENCOUNTER
Patient's TAVR CT was cancelled today due to kidney function. BMP today shows   Called and spoke with RN at patient's nephrology office today discussed updated lab results and asked for nephrology input. Direct call back number provided.

## 2025-06-05 ENCOUNTER — TELEPHONE (OUTPATIENT)
Dept: CARDIOLOGY | Facility: CLINIC | Age: 60
End: 2025-06-05
Payer: MEDICARE

## 2025-06-05 DIAGNOSIS — I35.0 SEVERE AORTIC VALVE STENOSIS: Primary | ICD-10-CM

## 2025-06-05 NOTE — TELEPHONE ENCOUNTER
Received return call from patient. Arranged TAVR CT 6/24/25 with 2 hours of hydration prior. Also reviewed that per Dr. Alfaro, he should hold his Hyzaar that morning. Patient states understanding and agreement with plan. Message sent to scheduling.

## 2025-06-05 NOTE — TELEPHONE ENCOUNTER
Reviewed nephrology notes/labs from patient's visit with Dr. Alfaro yesterday. Left voicemail for patient asking that he call back to schedule TAVR CT.

## 2025-06-24 ENCOUNTER — HOSPITAL ENCOUNTER (OUTPATIENT)
Dept: CARDIOLOGY | Facility: CLINIC | Age: 60
Discharge: HOME | End: 2025-06-24
Attending: INTERNAL MEDICINE
Payer: MEDICARE

## 2025-06-24 DIAGNOSIS — I35.0 SEVERE AORTIC VALVE STENOSIS: ICD-10-CM

## 2025-06-24 LAB
CREAT BLD-MCNC: 1.9 MG/DL (ref 0.7–1.2)
EGFRCR SERPLBLD CKD-EPI 2021: 40 ML/MIN/1.73M2

## 2025-06-24 PROCEDURE — 74174 CTA ABD&PLVS W/CONTRAST: CPT | Mod: 26 | Performed by: INTERNAL MEDICINE

## 2025-06-24 PROCEDURE — 82565 ASSAY OF CREATININE: CPT

## 2025-06-24 PROCEDURE — 258N000003 HC RX IP 258 OP 636: Performed by: INTERNAL MEDICINE

## 2025-06-24 PROCEDURE — 74174 CTA ABD&PLVS W/CONTRAST: CPT

## 2025-06-24 PROCEDURE — 71275 CT ANGIOGRAPHY CHEST: CPT | Mod: 26 | Performed by: INTERNAL MEDICINE

## 2025-06-24 PROCEDURE — 250N000011 HC RX IP 250 OP 636: Performed by: INTERNAL MEDICINE

## 2025-06-24 RX ORDER — LIDOCAINE 40 MG/G
CREAM TOPICAL
Status: DISCONTINUED | OUTPATIENT
Start: 2025-06-24 | End: 2025-06-25 | Stop reason: HOSPADM

## 2025-06-24 RX ORDER — IOPAMIDOL 755 MG/ML
500 INJECTION, SOLUTION INTRAVASCULAR ONCE
Status: COMPLETED | OUTPATIENT
Start: 2025-06-24 | End: 2025-06-24

## 2025-06-24 RX ORDER — ONDANSETRON 2 MG/ML
4 INJECTION INTRAMUSCULAR; INTRAVENOUS
Status: DISCONTINUED | OUTPATIENT
Start: 2025-06-24 | End: 2025-06-25 | Stop reason: HOSPADM

## 2025-06-24 RX ORDER — NITROGLYCERIN 0.4 MG/1
0.4 TABLET SUBLINGUAL
Status: DISCONTINUED | OUTPATIENT
Start: 2025-06-24 | End: 2025-06-25 | Stop reason: HOSPADM

## 2025-06-24 RX ADMIN — IOPAMIDOL 115 ML: 755 INJECTION, SOLUTION INTRAVENOUS at 14:10

## 2025-06-24 RX ADMIN — SODIUM CHLORIDE 300 ML: 9 INJECTION, SOLUTION INTRAVENOUS at 08:10

## 2025-06-25 ENCOUNTER — RESULTS FOLLOW-UP (OUTPATIENT)
Dept: CARDIOLOGY | Facility: CLINIC | Age: 60
End: 2025-06-25

## 2025-06-25 ENCOUNTER — TELEPHONE (OUTPATIENT)
Dept: OTHER | Facility: CLINIC | Age: 60
End: 2025-06-25
Payer: MEDICARE

## 2025-06-25 ENCOUNTER — TELEPHONE (OUTPATIENT)
Dept: CARDIOLOGY | Facility: CLINIC | Age: 60
End: 2025-06-25
Payer: MEDICARE

## 2025-06-25 NOTE — TELEPHONE ENCOUNTER
Per secure chat communication with Joanna Ivan RN patient is in need of a consult appointment with Dr Castillo to discuss having a TAVR. Informed RN that Dr Castillo's [facility] RN is out on vacation for the remainder of the week and writer will try to discuss this patient with Dr Castillo during clinic on 06/26/25. Joanna states if unable to have a discussion on 06/26/25 waiting until Belinda, RN returns to clinic would suffice.    Routing encounter to [facility] RN triage as FYI and form of documentation outside of the secure EPIC chat.

## 2025-06-25 NOTE — TELEPHONE ENCOUNTER
Received return call from patient. We discussed that they next step would be to see Dr. Castillo to discuss alternative access for TAVR. Grey states agreement with plan. Sent message to vascular team to arrange consult with Dr. Castillo.

## 2025-06-25 NOTE — TELEPHONE ENCOUNTER
Reviewed patient's TAVR CT with Dr. Mercado. Patient will need to see Dr. Castillo to discuss alternative access for TAVR. Left voicemail for patient requesting call back.

## 2025-06-26 NOTE — TELEPHONE ENCOUNTER
Patient has been scheduled for 07/15/25 and added to wait list if a sooner date become available he will be contacted to reschedule.

## 2025-07-14 NOTE — PROGRESS NOTES
[place] [facility]    Grey Candelario has a history of aortic stenosis and is being evaluated for TAVR.  Last met him in 2019 before he transferred his vascular care to the [facility].    PMH: Medications: Hyzaar, Toprol XL, Crestor, aspirin   Medical: Hypertension    Hyperlipidemia on statin    L2-3 and L3-4 posterior spinal fusion    Iliac angioplasty and stenting    CKD stage IIIa--improved.  History renal artery stenosis by CT     Followed by Dr. Austyn Alfaro.  6/24/2025 SCr= 1.9    TAVR CTA: Previous aorto by iliac stent graft.  Right common femoral interposition graft.  Left common femoral enterectomy and patch angioplasty.  Minimal disease in the right and left common carotid arteries.  Left subclavian with moderate stenosis up to 4 mm in diameter  Right common carotid artery measures 7 x 6 mm.  Left common carotid measured 6 x 5 mm    11/16/2018: Bilateral iliac artery angioplasty and stenting    11/14/2019: Angioplasty and stenting of right and left common and external iliac arteries.  Angioplasty right SFA and popliteal artery and left SFA via brachial artery approach.    2/3/2022: Left femoral endarterectomy with bovine patch.  Right iliofemoral dacron bypass at [facility]    -- No history of carotid duplex or CTA    Patient followed by Dr. Mercado interventional cardiology with last visit 4/24/2025    ROS: Overall doing well-  Particularly following multiple interventions at [facility] for PAD.  Ambulatory.  Coronary disease and eventually will require coronary stenting but per cardiology a separate procedure from the TAVR.  Somewhat labile renal insufficiency particularly after any contrast load.    Exam: Alert and appropriate.  Normal affect.  Ambulatory.  Here with his wife.   Blood pressure 123/69 right.  120/65 left.  Pulse 75 regular   HEENT =unremarkable.  Normal range of motion. Glasses.   Chest= clear   Cardiovascular= regular rate  +3 radial pulses bilaterally    Performed  carotid duplex today.  Mild narrowing at the carotid bifurcation bilaterally with no significant stenosis.      TAVR CTA images with the patient and his wife.  Minimal arch disease.  Both right and left common carotid arteries have no plaque stenosis.    IMPRESSION:   #1.  Patient would be a candidate for open common carotid closure and placement of the sheath for delivery of the TAVR.  Usually this is performed on the left side.  Interoperative EEG monitoring would be performed since typically the issues are occlusive but little concerns with minimal carotid bifurcation disease.    #2.  Stable aortoiliac and PAD following multiple interventions most recently at [facility] and was followed in this    #3.  Coronary artery stenosis.  I was unable to answer the timing of stent placement which is usually performed via radial artery access determined by interventional cardiology    25 minutes with patient today including chart review.    Giorgio Castillo MD   This note was created using Dragon voice recognition software which may result in transcription errors.

## 2025-07-15 ENCOUNTER — OFFICE VISIT (OUTPATIENT)
Dept: OTHER | Facility: CLINIC | Age: 60
End: 2025-07-15
Attending: SURGERY
Payer: MEDICARE

## 2025-07-15 ENCOUNTER — HOSPITAL ENCOUNTER (OUTPATIENT)
Dept: ULTRASOUND IMAGING | Facility: CLINIC | Age: 60
Discharge: HOME | End: 2025-07-15
Attending: SURGERY
Payer: MEDICARE

## 2025-07-15 VITALS — HEART RATE: 74 BPM | SYSTOLIC BLOOD PRESSURE: 123 MMHG | DIASTOLIC BLOOD PRESSURE: 69 MMHG

## 2025-07-15 DIAGNOSIS — I35.0 NONRHEUMATIC AORTIC VALVE STENOSIS: Primary | ICD-10-CM

## 2025-07-15 DIAGNOSIS — I73.9 PAD (PERIPHERAL ARTERY DISEASE): ICD-10-CM

## 2025-07-15 DIAGNOSIS — I65.23 CAROTID STENOSIS, ASYMPTOMATIC, BILATERAL: ICD-10-CM

## 2025-07-15 DIAGNOSIS — Z01.818 PREOP TESTING: ICD-10-CM

## 2025-07-15 DIAGNOSIS — R09.89 OTHER SPECIFIED SYMPTOMS AND SIGNS INVOLVING THE CIRCULATORY AND RESPIRATORY SYSTEMS: ICD-10-CM

## 2025-07-15 PROBLEM — F10.20 ALCOHOL DEPENDENCE (H): Status: ACTIVE | Noted: 2025-07-15

## 2025-07-15 PROBLEM — J30.2 SEASONAL ALLERGIES: Status: ACTIVE | Noted: 2025-07-15

## 2025-07-15 PROBLEM — F32.1 MODERATE MAJOR DEPRESSION (H): Status: ACTIVE | Noted: 2025-07-15

## 2025-07-15 PROBLEM — R31.1 BENIGN ESSENTIAL MICROSCOPIC HEMATURIA: Status: ACTIVE | Noted: 2022-10-15

## 2025-07-15 PROBLEM — N18.2 CKD (CHRONIC KIDNEY DISEASE) STAGE 2, GFR 60-89 ML/MIN: Status: ACTIVE | Noted: 2025-07-15

## 2025-07-15 PROBLEM — M79.645 THUMB PAIN, LEFT: Status: ACTIVE | Noted: 2025-07-15

## 2025-07-15 PROBLEM — I70.209 ATHEROSCLEROSIS OF ARTERIES OF EXTREMITIES: Status: ACTIVE | Noted: 2022-01-25

## 2025-07-15 PROBLEM — D62 ANEMIA DUE TO ACUTE BLOOD LOSS: Status: ACTIVE | Noted: 2022-10-15

## 2025-07-15 PROBLEM — I10 PRIMARY HYPERTENSION: Chronic | Status: ACTIVE | Noted: 2022-01-25

## 2025-07-15 PROBLEM — R10.9 ABDOMINAL PAIN, ACUTE: Status: ACTIVE | Noted: 2025-07-15

## 2025-07-15 PROBLEM — M48.061 SPINAL STENOSIS OF LUMBAR REGION: Status: ACTIVE | Noted: 2022-09-28

## 2025-07-15 PROBLEM — N18.31 STAGE 3A CHRONIC KIDNEY DISEASE (H): Chronic | Status: ACTIVE | Noted: 2022-10-15

## 2025-07-15 PROBLEM — E55.9 VITAMIN D DEFICIENCY: Status: ACTIVE | Noted: 2025-07-15

## 2025-07-15 PROBLEM — M76.01 GLUTEAL TENDINITIS, RIGHT HIP: Status: ACTIVE | Noted: 2025-07-15

## 2025-07-15 PROBLEM — M47.9 SPONDYLOSIS: Status: ACTIVE | Noted: 2022-09-28

## 2025-07-15 PROBLEM — F10.10 NONDEPENDENT ALCOHOL ABUSE: Chronic | Status: ACTIVE | Noted: 2022-02-04

## 2025-07-15 PROBLEM — E78.2 HYPERLIPIDEMIA, MIXED: Status: ACTIVE | Noted: 2025-07-15

## 2025-07-15 PROBLEM — F32.A DEPRESSIVE DISORDER: Chronic | Status: ACTIVE | Noted: 2022-10-15

## 2025-07-15 PROBLEM — E78.00 PURE HYPERCHOLESTEROLEMIA: Chronic | Status: ACTIVE | Noted: 2022-02-03

## 2025-07-15 PROCEDURE — 93880 EXTRACRANIAL BILAT STUDY: CPT

## 2025-07-15 PROCEDURE — 3074F SYST BP LT 130 MM HG: CPT | Performed by: SURGERY

## 2025-07-15 PROCEDURE — G0463 HOSPITAL OUTPT CLINIC VISIT: HCPCS | Performed by: SURGERY

## 2025-07-15 PROCEDURE — 3078F DIAST BP <80 MM HG: CPT | Performed by: SURGERY

## 2025-07-15 PROCEDURE — 99203 OFFICE O/P NEW LOW 30 MIN: CPT | Performed by: SURGERY

## 2025-07-15 NOTE — PROGRESS NOTES
[facility]        Patient is here for a consult.    Pt is currently taking Aspirin and Statin.    /69 (BP Location: Right arm, Patient Position: Chair, Cuff Size: Adult Regular)   Pulse 74     The provider has been notified that the patient has no concerns.     Questions patient would like addressed today are: N/A.    Refills are needed: N/A    Has homecare services and agency name:  Yareli Carter MA

## 2025-07-16 ENCOUNTER — TELEPHONE (OUTPATIENT)
Dept: CARDIOLOGY | Facility: CLINIC | Age: 60
End: 2025-07-16
Payer: MEDICARE

## 2025-07-16 ENCOUNTER — MYC MEDICAL ADVICE (OUTPATIENT)
Dept: CARDIOLOGY | Facility: CLINIC | Age: 60
End: 2025-07-16
Payer: MEDICARE

## 2025-07-16 DIAGNOSIS — I25.10 CORONARY ARTERY CALCIFICATION: Primary | ICD-10-CM

## 2025-07-16 NOTE — TELEPHONE ENCOUNTER
Patient was presented this morning at the multidisciplinary valve conference. Plan is to proceed with PCI procedure via radial approach.     Plan for TAVR following PCI.     Valve: Garcia  Approach: Carotid cutdown with Dr. Castillo  Sedation: General anesthesia and DEBI    Above information was called out to the patient. PCI ordered. Will schedule TAVR pending PCI. Sent message to scheduling.     Joanna Ivan RN  Structural Heart Coordinator  [facility]

## 2025-07-22 NOTE — TELEPHONE ENCOUNTER
Spoke with patient. He prefers TAVR date 9/16/25. Sent message to Dr. Castillo's team with [facility].

## 2025-07-24 ENCOUNTER — OFFICE VISIT (OUTPATIENT)
Dept: CARDIOLOGY | Facility: CLINIC | Age: 60
End: 2025-07-24
Payer: MEDICARE

## 2025-07-24 ENCOUNTER — TELEPHONE (OUTPATIENT)
Dept: CARDIOLOGY | Facility: CLINIC | Age: 60
End: 2025-07-24

## 2025-07-24 ENCOUNTER — LAB (OUTPATIENT)
Dept: LAB | Facility: CLINIC | Age: 60
End: 2025-07-24
Payer: MEDICARE

## 2025-07-24 VITALS
OXYGEN SATURATION: 98 % | HEART RATE: 71 BPM | WEIGHT: 216 LBS | DIASTOLIC BLOOD PRESSURE: 78 MMHG | HEIGHT: 69 IN | BODY MASS INDEX: 31.99 KG/M2 | SYSTOLIC BLOOD PRESSURE: 124 MMHG

## 2025-07-24 DIAGNOSIS — I25.10 CORONARY ARTERY CALCIFICATION: ICD-10-CM

## 2025-07-24 DIAGNOSIS — I25.10 CORONARY ARTERY DISEASE INVOLVING NATIVE CORONARY ARTERY OF NATIVE HEART WITHOUT ANGINA PECTORIS: ICD-10-CM

## 2025-07-24 DIAGNOSIS — I10 BENIGN ESSENTIAL HYPERTENSION: ICD-10-CM

## 2025-07-24 DIAGNOSIS — I35.0 SEVERE AORTIC STENOSIS: Primary | ICD-10-CM

## 2025-07-24 LAB
ANION GAP SERPL CALCULATED.3IONS-SCNC: 16 MMOL/L (ref 7–15)
BUN SERPL-MCNC: 23.1 MG/DL (ref 8–23)
CALCIUM SERPL-MCNC: 9.8 MG/DL (ref 8.8–10.4)
CHLORIDE SERPL-SCNC: 104 MMOL/L (ref 98–107)
CREAT SERPL-MCNC: 3.28 MG/DL (ref 0.67–1.17)
EGFRCR SERPLBLD CKD-EPI 2021: 20 ML/MIN/1.73M2
GLUCOSE SERPL-MCNC: 112 MG/DL (ref 70–99)
HCO3 SERPL-SCNC: 18 MMOL/L (ref 22–29)
POTASSIUM SERPL-SCNC: 3.9 MMOL/L (ref 3.4–5.3)
SODIUM SERPL-SCNC: 138 MMOL/L (ref 135–145)

## 2025-07-24 NOTE — PROGRESS NOTES
Cardiology Clinic Progress Note  Grey Candelario MRN# [id number]   YOB: 1965 Age: 60 year old     Primary cardiologist: Dr. Mercado     Reason for visit: H&P coronary angiogram     History of presenting illness:    Grey Candelario is a pleasant 60 year old patient with past medical history significant for severe aortic stenosis, possible bicuspid aortic valve, coronary disease with known significant mid LAD stenosis per iFR, PAD with bilateral iliofemoral angioplasty and stenting in 2019, status post femoral enterectomy, history of alcohol abuse, and chronic kidney disease stage III with with hx of ELVA.     The patient is well-known to us in our structural clinic.  He has severe aortic stenosis per echocardiogram 12/2024 characterized by a mean gradient of 26 mmHg, V-max of 3.6 m/s, and a valve area of 0.74 cm .  The LV function is normal.  He underwent coronary angiogram 4/18/2025 demonstrating severe single-vessel coronary disease involving mid LAD and first diagonal with an IFR of 0.70.  Additionally he has severe peripheral arterial disease and transfemoral access is not an option.  With all that in mind, AVR and CABG was recommended, though patient strongly favors TAVR.  Therefore plan is for PCI followed by TAVR with carotid cutdown.    Today Grey denies any new or worsening symptoms.  He has ongoing dyspnea with minimal exertion.  He denies chest pain, syncope or near syncope.  No PND or orthopnea.    His blood pressure is well-controlled.  His weight is stable.    Labs today demonstrate cr creatinine of 3.28, GFR 20, potassium of 3.9.         Assessment and Plan:     ASSESSMENT: Pertinent issues addressed at this visit     Severe symptomatic aortic stenosis with possible bicuspid morphology  History of CAD with mid significant proximal to mid LAD/D1 stenosis per iFR (0.70)  PAD with bilateral iliofemoral angioplasty and stenting in 2019  Status post femoral artery endarterectomy  History of  "alcohol abuse  Chronic kidney disease stage III with history of contrast-induced nephropathy    PLAN:     Patient is scheduled for staged PCI of his LAD/D1 on 7/29/2025 with Dr. Maldonado.  Risks and benefits and potential complications were discussed including possibility of needing temporary or permanent dialysis, patient expresses understanding and wishes to proceed.  Recommend prehydration given advanced renal disease.  He will take full dose aspirin the morning of his procedure.  He will be n.p.o. starting at midnight the night prior, clear liquids okay with morning medications.  Plan for transcarotid TAVR in the next 2 to 3 months.  Follow-up in our structural clinic post PCI.         Trina Todd DNP, APRN, CNP  Page: [phone] (8a-5p M-F)    Orders this Visit:  No orders of the defined types were placed in this encounter.    No orders of the defined types were placed in this encounter.    There are no discontinued medications.    Today's clinic visit entailed:  Review of the result(s) of each unique test - echo, CT, EKG, labs, cath  Ordering of each unique test  Prescription drug management  40 minutes spent by me on the date of the encounter doing chart review, review of test results, interpretation of tests, patient visit, and documentation   Provider  Link to [facility] Help Grid     The level of medical decision making during this visit was of moderate complexity.           Review of Systems:     Review of Systems:  Skin:        Eyes:       ENT:       Respiratory:       Cardiovascular:       Gastroenterology:      Genitourinary:       Musculoskeletal:       Neurologic:       Psychiatric:       Heme/Lymph/Imm:       Endocrine:                 Physical Exam:   Vitals: /78   Pulse 71   Ht 1.753 m (5' 9\")   Wt 98 kg (216 lb)   SpO2 98%   BMI 31.90 kg/m    Constitutional:  cooperative        Skin:  warm and dry to the touch        Head:  normocephalic        Eyes:  pupils equal and round    "     ENT:  not assessed this visit        Neck:  JVP normal        Chest:  normal breath sounds, clear to auscultation, normal A-P diameter, normal symmetry, normal respiratory excursion, no use of accessory muscles        Cardiac: regular rhythm, normal S1 and S2       systolic ejection murmur, grade 2          Abdomen:  abdomen soft        Vascular:   pulses below the femoral arteries are diminished                                    Extremities and Back:  no edema        Neurological:  no gross motor deficits, affect appropriate             Medications:     Current Outpatient Medications   Medication Sig Dispense Refill    aspirin (ASA) 81 MG chewable tablet Take 81 mg by mouth daily.      losartan-hydrochlorothiazide (HYZAAR) 50-12.5 MG tablet 1 tablet daily.      metoprolol succinate ER (TOPROL XL) 50 MG 24 hr tablet Take 1 tablet (50 mg) by mouth daily. 90 tablet 0    rosuvastatin (CRESTOR) 40 MG tablet TAKE 1 TABLET BY MOUTH ONE TIME DAILY 90 tablet 3       History reviewed. No pertinent family history.    Social History     Socioeconomic History    Marital status: Single     Spouse name: Not on file    Number of children: Not on file    Years of education: Not on file    Highest education level: Not on file   Occupational History    Not on file   Tobacco Use    Smoking status: Former     Current packs/day: 1.00     Types: Cigarettes    Smokeless tobacco: Never   Substance and Sexual Activity    Alcohol use: Yes    Drug use: No    Sexual activity: Not on file   Other Topics Concern    Parent/sibling w/ CABG, MI or angioplasty before 65F 55M? Not Asked   Social History Narrative    Not on file     Social Drivers of Health     Financial Resource Strain: Low Risk  (10/5/2022)    Received from [facility]    Overall Financial Resource Strain (CARDIA)   Food Insecurity: No Food Insecurity (10/5/2022)    Received from [facility]    Hunger Vital Sign   Transportation Needs: No Transportation Needs (10/5/2022)     Received from [facility]    PRAPARE - Transportation     Lack of Transportation (Medical): No     Lack of Transportation (Non-Medical): No     : Not on file     : Not on file     : Not on file     : Not on file   Physical Activity: Inactive (10/5/2022)    Received from [facility]    Exercise Vital Sign     Days of Exercise per Week: 0 days     Minutes of Exercise per Session: 0 min     : Not on file     : Not on file     : Not on file     : Not on file   Stress: Stress Concern Present (10/5/2022)    Received from [facility]    [employer] [place] [employer] - Occupational Stress Questionnaire   Social Connections: Moderately Isolated (10/5/2022)    Received from [facility]    Social Connection and Isolation Panel [NHANES]   Interpersonal Safety: Low Risk  (4/18/2025)    Interpersonal Safety     Do you feel physically and emotionally safe where you currently live?: Yes     Within the past 12 months, have you been hit, slapped, kicked or otherwise physically hurt by someone?: No     Within the past 12 months, have you been humiliated or emotionally abused in other ways by your partner or ex-partner?: No   Housing Stability: High Risk (10/5/2022)    Received from [facility]    Housing Stability Vital Sign            Past Medical History:     Past Medical History:   Diagnosis Date    Current smoker     HTN (hypertension)     Hx of heart artery stent     2018 2 stents placed    Hyperlipidemia               Past Surgical History:     Past Surgical History:   Procedure Laterality Date    CV CORONARY ANGIOGRAM N/A 4/18/2025    Procedure: Coronary Angiogram;  Surgeon: Leonidas Auguste MD;  Location:  HEART CARDIAC CATH LAB    CV INSTANTANEOUS WAVE-FREE RATIO N/A 4/18/2025    Procedure: Instantaneous Wave-Free Ratio;  Surgeon: Leonidas Auguste MD;  Location:  HEART CARDIAC CATH LAB    IR LOWER EXTREMITY ANGIOGRAM BILATERAL  11/14/2019    IR RENAL BIOPSY RIGHT  5/10/2023    OTHER SURGICAL HISTORY      hip  surgery , hardware in place.              Allergies:   Jardiance [empagliflozin] and Morphine hcl       Data:   All laboratory data reviewed:    Recent Labs   Lab Test 04/27/23  1619 04/26/23  1507 04/20/23  1339 10/04/22  1622 11/14/19  1305 11/16/18  1120 03/23/18  0643   LDL  --   --   --   --  90 144* 82   HDL  --   --   --   --  79 96 80   NHDL  --   --   --   --  105 159* 95   CHOL  --   --   --   --  184 255* 175   TRIG  --   --  159* 98 77 76 63   IRON  --  64  --   --   --   --   --    FEB  --  203*  --   --   --   --   --    IRONSAT  --  32  --   --   --   --   --    FARIDEH 290  --   --   --   --   --   --        Lab Results   Component Value Date    WBC 6.8 04/18/2025    WBC 8.7 11/14/2019    RBC 3.44 (L) 04/18/2025    RBC 4.14 (L) 11/14/2019    HGB 11.9 (L) 04/18/2025    HGB 14.0 11/14/2019    HCT 34.7 (L) 04/18/2025    HCT 40.2 11/14/2019     (H) 04/18/2025    MCV 97 11/14/2019    MCH 34.6 (H) 04/18/2025    MCH 33.8 (H) 11/14/2019    MCHC 34.3 04/18/2025    MCHC 34.8 11/14/2019    RDW 12.0 04/18/2025    RDW 12.4 11/14/2019     04/18/2025     11/14/2019       Lab Results   Component Value Date     07/24/2025     08/10/2018    POTASSIUM 3.9 07/24/2025    POTASSIUM 3.9 08/10/2018    CHLORIDE 104 07/24/2025    CHLORIDE 103 10/29/2024    CHLORIDE 101 08/10/2018    CO2 18 (L) 07/24/2025    CO2 25 08/10/2018    ANIONGAP 16 (H) 07/24/2025    ANIONGAP 10 08/10/2018     (H) 07/24/2025    GLC 83 04/25/2023    GLC 88 08/10/2018    BUN 23.1 (H) 07/24/2025    BUN 15 08/10/2018    CR 3.28 (H) 07/24/2025    CR 0.84 11/14/2019    GFRESTIMATED 20 (L) 07/24/2025    GFRESTIMATED 40 (L) 06/24/2025    GFRESTIMATED >90 11/14/2019    GFRESTBLACK >90 11/14/2019    MARY 9.8 07/24/2025    MARY 8.9 08/10/2018      Lab Results   Component Value Date    AST 43 03/31/2025    AST 22 11/10/2017    ALT 25 03/31/2025    ALT 11 11/10/2017       Lab Results   Component Value Date    A1C 5.2 11/14/2019        Lab Results   Component Value Date    INR 0.97 04/18/2025    INR 1.09 04/27/2023    INR 0.94 11/14/2019    INR 0.85 (L) 11/16/2018

## 2025-07-24 NOTE — TELEPHONE ENCOUNTER
Patient had BMP completed today in clinic prior to PCI scheduled 7/29/25.       On day of TAVR CT 6/24/25 creat was 1.9. Called Dr. Mercado and reviewed BMP results. Dr. Mercado advised that PCI be cancelled at this time. Per Dr. Mercado's request, called Dr. Alfaro' office (nephrology) to review BMP results. Spoke with RN, requested Dr. Alfaro' advise on next steps and pre-meds/hydration.   Patient aware of results and plan.

## 2025-07-24 NOTE — PATIENT INSTRUCTIONS
Today's Plan:     - Take full dose aspirin the morning of procedure (4x 81 mg tablets).   - Nothing to eat or drink the morning of procedure.   - Arrive at 8:30 AM, check in at [facility].      If you have questions or concerns please call my nurse team:  Jade RN ([phone]) Bernie RN ([phone]) or Joanna RN ([phone])    After Hours: [phone], option #2, ask for cardiologist on-call    Scheduling phone number: [phone]    Reminder: Please bring in all current medications, over the counter supplements and vitamin bottles to your next appointment.-    It was a pleasure seeing you today!     Trina Todd, ASHISH  7/24/2025    -

## 2025-07-24 NOTE — LETTER
7/24/2025    Santosh Ortega MD  [address]  [place] [address]    RE: Grey Candelario       Dear Colleague,     I had the pleasure of seeing Grey Candelario in the [facility].  Cardiology Clinic Progress Note  Grey Candelario MRN# [id number]   YOB: 1965 Age: 60 year old     Primary cardiologist: Dr. Mercado     Reason for visit: H&P coronary angiogram     History of presenting illness:    Grey Candelario is a pleasant 60 year old patient with past medical history significant for severe aortic stenosis, possible bicuspid aortic valve, coronary disease with known significant mid LAD stenosis per iFR, PAD with bilateral iliofemoral angioplasty and stenting in 2019, status post femoral enterectomy, history of alcohol abuse, and chronic kidney disease stage III with with hx of ELVA.     The patient is well-known to us in our structural clinic.  He has severe aortic stenosis per echocardiogram 12/2024 characterized by a mean gradient of 26 mmHg, V-max of 3.6 m/s, and a valve area of 0.74 cm .  The LV function is normal.  He underwent coronary angiogram 4/18/2025 demonstrating severe single-vessel coronary disease involving mid LAD and first diagonal with an IFR of 0.70.  Additionally he has severe peripheral arterial disease and transfemoral access is not an option.  With all that in mind, AVR and CABG was recommended, though patient strongly favors TAVR.  Therefore plan is for PCI followed by TAVR with carotid cutdown.    Today Grey denies any new or worsening symptoms.  He has ongoing dyspnea with minimal exertion.  He denies chest pain, syncope or near syncope.  No PND or orthopnea.    His blood pressure is well-controlled.  His weight is stable.    Labs today demonstrate cr creatinine of 3.28, GFR 20, potassium of 3.9.         Assessment and Plan:     ASSESSMENT: Pertinent issues addressed at this visit     Severe symptomatic aortic stenosis with possible bicuspid  morphology  History of CAD with mid significant proximal to mid LAD/D1 stenosis per iFR (0.70)  PAD with bilateral iliofemoral angioplasty and stenting in 2019  Status post femoral artery endarterectomy  History of alcohol abuse  Chronic kidney disease stage III with history of contrast-induced nephropathy    PLAN:     Patient is scheduled for staged PCI of his LAD/D1 on 7/29/2025 with Dr. Maldonado.  Risks and benefits and potential complications were discussed including possibility of needing temporary or permanent dialysis, patient expresses understanding and wishes to proceed.  Recommend prehydration given advanced renal disease.  He will take full dose aspirin the morning of his procedure.  He will be n.p.o. starting at midnight the night prior, clear liquids okay with morning medications.  Plan for transcarotid TAVR in the next 2 to 3 months.  Follow-up in our structural clinic post PCI.         Trina Todd DNP, APRN, CNP  Page: [phone] (8a-5p M-F)    Orders this Visit:  No orders of the defined types were placed in this encounter.    No orders of the defined types were placed in this encounter.    There are no discontinued medications.    Today's clinic visit entailed:  Review of the result(s) of each unique test - echo, CT, EKG, labs, cath  Ordering of each unique test  Prescription drug management  40 minutes spent by me on the date of the encounter doing chart review, review of test results, interpretation of tests, patient visit, and documentation   Provider  Link to [facility] Help Grid     The level of medical decision making during this visit was of moderate complexity.           Review of Systems:     Review of Systems:  Skin:        Eyes:       ENT:       Respiratory:       Cardiovascular:       Gastroenterology:      Genitourinary:       Musculoskeletal:       Neurologic:       Psychiatric:       Heme/Lymph/Imm:       Endocrine:                 Physical Exam:   Vitals: /78   Pulse 71   Ht  "1.753 m (5' 9\")   Wt 98 kg (216 lb)   SpO2 98%   BMI 31.90 kg/m    Constitutional:  cooperative        Skin:  warm and dry to the touch        Head:  normocephalic        Eyes:  pupils equal and round        ENT:  not assessed this visit        Neck:  JVP normal        Chest:  normal breath sounds, clear to auscultation, normal A-P diameter, normal symmetry, normal respiratory excursion, no use of accessory muscles        Cardiac: regular rhythm, normal S1 and S2       systolic ejection murmur, grade 2          Abdomen:  abdomen soft        Vascular:   pulses below the femoral arteries are diminished                                    Extremities and Back:  no edema        Neurological:  no gross motor deficits, affect appropriate             Medications:     Current Outpatient Medications   Medication Sig Dispense Refill     aspirin (ASA) 81 MG chewable tablet Take 81 mg by mouth daily.       losartan-hydrochlorothiazide (HYZAAR) 50-12.5 MG tablet 1 tablet daily.       metoprolol succinate ER (TOPROL XL) 50 MG 24 hr tablet Take 1 tablet (50 mg) by mouth daily. 90 tablet 0     rosuvastatin (CRESTOR) 40 MG tablet TAKE 1 TABLET BY MOUTH ONE TIME DAILY 90 tablet 3       History reviewed. No pertinent family history.    Social History     Socioeconomic History     Marital status: Single     Spouse name: Not on file     Number of children: Not on file     Years of education: Not on file     Highest education level: Not on file   Occupational History     Not on file   Tobacco Use     Smoking status: Former     Current packs/day: 1.00     Types: Cigarettes     Smokeless tobacco: Never   Substance and Sexual Activity     Alcohol use: Yes     Drug use: No     Sexual activity: Not on file   Other Topics Concern     Parent/sibling w/ CABG, MI or angioplasty before 65F 55M? Not Asked   Social History Narrative     Not on file     Social Drivers of Health     Financial Resource Strain: Low Risk  (10/5/2022)    Received from " [facility]    Overall Financial Resource Strain (CARDIA)   Food Insecurity: No Food Insecurity (10/5/2022)    Received from [facility]    Hunger Vital Sign   Transportation Needs: No Transportation Needs (10/5/2022)    Received from [facility]    PRAPARE - Transportation      Lack of Transportation (Medical): No      Lack of Transportation (Non-Medical): No      : Not on file      : Not on file      : Not on file      : Not on file   Physical Activity: Inactive (10/5/2022)    Received from [facility]    Exercise Vital Sign      Days of Exercise per Week: 0 days      Minutes of Exercise per Session: 0 min      : Not on file      : Not on file      : Not on file      : Not on file   Stress: Stress Concern Present (10/5/2022)    Received from [facility]    [employer] - Occupational Stress Questionnaire   Social Connections: Moderately Isolated (10/5/2022)    Received from [facility]    Social Connection and Isolation Panel [NHANES]   Interpersonal Safety: Low Risk  (4/18/2025)    Interpersonal Safety      Do you feel physically and emotionally safe where you currently live?: Yes      Within the past 12 months, have you been hit, slapped, kicked or otherwise physically hurt by someone?: No      Within the past 12 months, have you been humiliated or emotionally abused in other ways by your partner or ex-partner?: No   Housing Stability: High Risk (10/5/2022)    Received from [facility]    Housing Stability Vital Sign            Past Medical History:     Past Medical History:   Diagnosis Date     Current smoker      HTN (hypertension)      Hx of heart artery stent     2018 2 stents placed     Hyperlipidemia               Past Surgical History:     Past Surgical History:   Procedure Laterality Date     CV CORONARY ANGIOGRAM N/A 4/18/2025    Procedure: Coronary Angiogram;  Surgeon: Leonidas Auguste MD;  Location: [facility] CARDIAC CATH LAB     CV INSTANTANEOUS WAVE-FREE RATIO N/A  4/18/2025    Procedure: Instantaneous Wave-Free Ratio;  Surgeon: Leonidas Auguste MD;  Location:  HEART CARDIAC CATH LAB     IR LOWER EXTREMITY ANGIOGRAM BILATERAL  11/14/2019     IR RENAL BIOPSY RIGHT  5/10/2023     OTHER SURGICAL HISTORY      hip surgery , hardware in place.              Allergies:   Jardiance [empagliflozin] and Morphine hcl       Data:   All laboratory data reviewed:    Recent Labs   Lab Test 04/27/23  1619 04/26/23  1507 04/20/23  1339 10/04/22  1622 11/14/19  1305 11/16/18  1120 03/23/18  0643   LDL  --   --   --   --  90 144* 82   HDL  --   --   --   --  79 96 80   NHDL  --   --   --   --  105 159* 95   CHOL  --   --   --   --  184 255* 175   TRIG  --   --  159* 98 77 76 63   IRON  --  64  --   --   --   --   --    FEB  --  203*  --   --   --   --   --    IRONSAT  --  32  --   --   --   --   --    FARIDEH 290  --   --   --   --   --   --        Lab Results   Component Value Date    WBC 6.8 04/18/2025    WBC 8.7 11/14/2019    RBC 3.44 (L) 04/18/2025    RBC 4.14 (L) 11/14/2019    HGB 11.9 (L) 04/18/2025    HGB 14.0 11/14/2019    HCT 34.7 (L) 04/18/2025    HCT 40.2 11/14/2019     (H) 04/18/2025    MCV 97 11/14/2019    MCH 34.6 (H) 04/18/2025    MCH 33.8 (H) 11/14/2019    MCHC 34.3 04/18/2025    MCHC 34.8 11/14/2019    RDW 12.0 04/18/2025    RDW 12.4 11/14/2019     04/18/2025     11/14/2019       Lab Results   Component Value Date     07/24/2025     08/10/2018    POTASSIUM 3.9 07/24/2025    POTASSIUM 3.9 08/10/2018    CHLORIDE 104 07/24/2025    CHLORIDE 103 10/29/2024    CHLORIDE 101 08/10/2018    CO2 18 (L) 07/24/2025    CO2 25 08/10/2018    ANIONGAP 16 (H) 07/24/2025    ANIONGAP 10 08/10/2018     (H) 07/24/2025    GLC 83 04/25/2023    GLC 88 08/10/2018    BUN 23.1 (H) 07/24/2025    BUN 15 08/10/2018    CR 3.28 (H) 07/24/2025    CR 0.84 11/14/2019    GFRESTIMATED 20 (L) 07/24/2025    GFRESTIMATED 40 (L) 06/24/2025    GFRESTIMATED >90 11/14/2019     GFRESTBLACK >90 11/14/2019    MARY 9.8 07/24/2025    MARY 8.9 08/10/2018      Lab Results   Component Value Date    AST 43 03/31/2025    AST 22 11/10/2017    ALT 25 03/31/2025    ALT 11 11/10/2017       Lab Results   Component Value Date    A1C 5.2 11/14/2019       Lab Results   Component Value Date    INR 0.97 04/18/2025    INR 1.09 04/27/2023    INR 0.94 11/14/2019    INR 0.85 (L) 11/16/2018         Thank you for allowing me to participate in the care of your patient.      Sincerely,     Trina Todd, [facility]  cc:   No referring provider defined for this encounter.

## 2025-08-05 ENCOUNTER — TELEPHONE (OUTPATIENT)
Dept: CARDIOLOGY | Facility: CLINIC | Age: 60
End: 2025-08-05
Payer: MEDICARE

## (undated) DEVICE — CATH DIAGNOSTIC RADIAL 5FR TIG 4.0

## (undated) DEVICE — INTRO GLIDESHEATH SLENDER 6FR 10X45CM 60-1060

## (undated) DEVICE — RAD G/W INQWIRE .035X260CM J-TIP EXCHANGE IQ35F260J1O5RS

## (undated) DEVICE — CATH GD VISTA BRITE BLU YLW 100CM 6FR XB3.5 6700540E

## (undated) DEVICE — MANIFOLD KIT ANGIO AUTOMATED 014613

## (undated) DEVICE — SLEEVE TR BAND RADIAL COMPRESSION DEVICE 29CM XX-RF06L

## (undated) DEVICE — RADPAD FEM ENTRY ANGIO SHLD YE

## (undated) DEVICE — GW VASC OMNIWIRE J L185CM PRESSURE 89185J

## (undated) DEVICE — KIT LG BORE TOUHY ACCESS PLUS MAP152

## (undated) DEVICE — TOTE ANGIO CORP PC15AT SAN32CC83O

## (undated) DEVICE — KIT HAND CONTROL ANGIOTOUCH ACIST 65CM AT-P65

## (undated) RX ORDER — FENTANYL CITRATE 50 UG/ML
INJECTION, SOLUTION INTRAMUSCULAR; INTRAVENOUS
Status: DISPENSED
Start: 2025-04-18

## (undated) RX ORDER — LIDOCAINE HYDROCHLORIDE 10 MG/ML
INJECTION, SOLUTION EPIDURAL; INFILTRATION; INTRACAUDAL; PERINEURAL
Status: DISPENSED
Start: 2025-04-18

## (undated) RX ORDER — HEPARIN SODIUM 200 [USP'U]/100ML
INJECTION, SOLUTION INTRAVENOUS
Status: DISPENSED
Start: 2025-04-18

## (undated) RX ORDER — VERAPAMIL HYDROCHLORIDE 2.5 MG/ML
INJECTION INTRAVENOUS
Status: DISPENSED
Start: 2025-04-18

## (undated) RX ORDER — HEPARIN SODIUM 1000 [USP'U]/ML
INJECTION, SOLUTION INTRAVENOUS; SUBCUTANEOUS
Status: DISPENSED
Start: 2025-04-18

## (undated) RX ORDER — LIDOCAINE HYDROCHLORIDE 10 MG/ML
INJECTION, SOLUTION EPIDURAL; INFILTRATION; INTRACAUDAL; PERINEURAL
Status: DISPENSED
Start: 2023-05-10

## (undated) RX ORDER — SODIUM CHLORIDE 9 MG/ML
INJECTION, SOLUTION INTRAVENOUS
Status: DISPENSED
Start: 2023-05-10

## (undated) RX ORDER — FENTANYL CITRATE 50 UG/ML
INJECTION, SOLUTION INTRAMUSCULAR; INTRAVENOUS
Status: DISPENSED
Start: 2023-05-10